# Patient Record
Sex: MALE | Race: WHITE | Employment: FULL TIME | URBAN - METROPOLITAN AREA
[De-identification: names, ages, dates, MRNs, and addresses within clinical notes are randomized per-mention and may not be internally consistent; named-entity substitution may affect disease eponyms.]

---

## 2017-02-13 ENCOUNTER — OFFICE VISIT (OUTPATIENT)
Dept: FAMILY MEDICINE | Facility: OTHER | Age: 27
End: 2017-02-13
Attending: NURSE PRACTITIONER
Payer: COMMERCIAL

## 2017-02-13 VITALS
HEIGHT: 74 IN | HEART RATE: 88 BPM | OXYGEN SATURATION: 97 % | TEMPERATURE: 98.2 F | SYSTOLIC BLOOD PRESSURE: 132 MMHG | DIASTOLIC BLOOD PRESSURE: 70 MMHG | BODY MASS INDEX: 33.65 KG/M2 | RESPIRATION RATE: 14 BRPM | WEIGHT: 262.2 LBS

## 2017-02-13 DIAGNOSIS — F33.0 MILD EPISODE OF RECURRENT MAJOR DEPRESSIVE DISORDER (H): ICD-10-CM

## 2017-02-13 DIAGNOSIS — E03.4 HYPOTHYROIDISM DUE TO ACQUIRED ATROPHY OF THYROID: ICD-10-CM

## 2017-02-13 DIAGNOSIS — J45.30 MILD PERSISTENT ASTHMA WITHOUT COMPLICATION: ICD-10-CM

## 2017-02-13 DIAGNOSIS — M25.511 RIGHT SHOULDER PAIN, UNSPECIFIED CHRONICITY: ICD-10-CM

## 2017-02-13 DIAGNOSIS — I10 BENIGN ESSENTIAL HYPERTENSION: Primary | ICD-10-CM

## 2017-02-13 LAB
ANION GAP SERPL CALCULATED.3IONS-SCNC: 8 MMOL/L (ref 3–14)
BUN SERPL-MCNC: 12 MG/DL (ref 7–30)
CALCIUM SERPL-MCNC: 9.8 MG/DL (ref 8.5–10.1)
CHLORIDE SERPL-SCNC: 104 MMOL/L (ref 94–109)
CHOLEST SERPL-MCNC: 182 MG/DL
CO2 SERPL-SCNC: 27 MMOL/L (ref 20–32)
CREAT SERPL-MCNC: 0.86 MG/DL (ref 0.66–1.25)
GFR SERPL CREATININE-BSD FRML MDRD: NORMAL ML/MIN/1.7M2
GLUCOSE SERPL-MCNC: 82 MG/DL (ref 70–99)
HDLC SERPL-MCNC: 46 MG/DL
LDLC SERPL CALC-MCNC: 107 MG/DL
NONHDLC SERPL-MCNC: 136 MG/DL
POTASSIUM SERPL-SCNC: 4.6 MMOL/L (ref 3.4–5.3)
SODIUM SERPL-SCNC: 139 MMOL/L (ref 133–144)
TRIGL SERPL-MCNC: 147 MG/DL
TSH SERPL DL<=0.005 MIU/L-ACNC: 1.92 MU/L (ref 0.4–4)

## 2017-02-13 PROCEDURE — 36415 COLL VENOUS BLD VENIPUNCTURE: CPT | Performed by: NURSE PRACTITIONER

## 2017-02-13 PROCEDURE — 99214 OFFICE O/P EST MOD 30 MIN: CPT | Performed by: NURSE PRACTITIONER

## 2017-02-13 PROCEDURE — 80048 BASIC METABOLIC PNL TOTAL CA: CPT | Performed by: NURSE PRACTITIONER

## 2017-02-13 PROCEDURE — 80061 LIPID PANEL: CPT | Performed by: NURSE PRACTITIONER

## 2017-02-13 PROCEDURE — 84443 ASSAY THYROID STIM HORMONE: CPT | Performed by: NURSE PRACTITIONER

## 2017-02-13 RX ORDER — TRAMADOL HYDROCHLORIDE 50 MG/1
50-100 TABLET ORAL
Qty: 30 TABLET | Refills: 0 | Status: SHIPPED | OUTPATIENT
Start: 2017-02-13 | End: 2017-05-15

## 2017-02-13 ASSESSMENT — PAIN SCALES - GENERAL: PAINLEVEL: MODERATE PAIN (4)

## 2017-02-13 NOTE — MR AVS SNAPSHOT
After Visit Summary   2/13/2017    Kannan Ordonez    MRN: [id number]           Patient Information     Date Of Birth          1990        Visit Information        Provider Department      2/13/2017 9:45 AM Isabela Gonzalez, NP [facility]        Today's Diagnoses     Benign essential hypertension    -  1    Mild persistent asthma without complication        Hypothyroidism due to acquired atrophy of thyroid        Mild episode of recurrent major depressive disorder (H)        Right shoulder pain, unspecified chronicity          Care Instructions      ASSESSMENT / PLAN:  1. Benign essential hypertension  chronic  - Lipid Profile  - Basic metabolic panel    2. Mild persistent asthma without complication  Chronic  - continue current plan    3. Hypothyroidism due to acquired atrophy of thyroid  chornic  - TSH with free T4 reflex    4. Mild episode of recurrent major depressive disorder (H)  chronic    5. Right shoulder pain, unspecified chronicity  Follow-up with ortho as scheduled  - traMADol (ULTRAM) 50 MG tablet; Take 1-2 tablets ( mg) by mouth nightly as needed for pain maximum 2 tablet(s) per day  Dispense: 30 tablet; Refill: 0  - continue ice, heat  - ibuprofen 800mg twice daily      Follow-up in 3 months or as needed for acute concerns    Isabela Gonzalez,   Certified Adult Nurse Practitioner  [phone]  My Asthma Action Plan  Name: Kannan Ordonez   YOB: 1990  Date: 2/13/2017   My doctor: Isabela Gonzalez   [facility]      My Control Medicine: Budesonide+formoterol (Symbicort) -  160/4.5 mcg        Dose: 2 PUFFS  My Rescue Medicine: Albuterol (Proair/Ventolin/Proventil) HFA        Dose: PRN   My Asthma Severity: mild persistent  Avoid your asthma triggers: upper respiratory infections, pollens, animal dander, humidity, exercise or sports and cold air        GREEN ZONE   Good Control    I feel  good    No cough or wheeze    Can work, sleep and play without asthma symptoms       Take your asthma control medicine every day.     1. If exercise triggers your asthma, take your rescue medication    15 minutes before exercise or sports, and    During exercise if you have asthma symptoms  2. Spacer to use with inhaler: If you have a spacer, make sure to use it with your inhaler             YELLOW ZONE Getting Worse  I have ANY of these:    I do not feel good    Cough or wheeze    Chest feels tight    Wake up at night   1. Keep taking your Green Zone medications  2. Start taking your rescue medicine:    every 20 minutes for up to 1 hour. Then every 4 hours for 24-48 hours.  3. If you stay in the Yellow Zone for more than 12-24 hours, contact your doctor.  4. If you do not return to the Green Zone in 12-24 hours or you get worse, start taking your oral steroid medicine if prescribed by your provider.           RED ZONE Medical Alert - Get Help  I have ANY of these:    I feel awful    Medicine is not helping    Breathing getting harder    Trouble walking or talking    Nose opens wide to breathe       1. Take your rescue medicine NOW  2. If your provider has prescribed an oral steroid medicine, start taking it NOW  3. Call your doctor NOW  4. If you are still in the Red Zone after 20 minutes and you have not reached your doctor:    Take your rescue medicine again and    Call 911 or go to the emergency room right away    See your regular doctor within 2 weeks of an Emergency Room or Urgent Care visit for follow-up treatment.        The above medication may be given at school or day care?: Yes and N/A (Adult Patient)  Child can carry and use inhaler(s) at school with approval of school nurse?: Yes and N/A (Adult Patient)    Electronically signed by: ELVA IBARRA, February 13, 2017    Annual Reminders:  Meet with Asthma Educator,  Flu Shot in the Fall, consider Pneumonia Vaccination for patients with asthma (aged 19  and older).    Pharmacy:    [id number] - [place], [place] - [address], [address] [id number] - [place], [place] - [address] AT [facility] [address]                    Asthma Triggers  How To Control Things That Make Your Asthma Worse    Triggers are things that make your asthma worse.  Look at the list below to help you find your triggers and what you can do about them.  You can help prevent asthma flare-ups by staying away from your triggers.      Trigger                                                          What you can do   Cigarette Smoke  Tobacco smoke can make asthma worse. Do not allow smoking in your home, car or around you.  Be sure no one smokes at a child s day care or school.  If you smoke, ask your health care provider for ways to help you quit.  Ask family members to quit too.  Ask your health care provider for a referral to Quit Plan to help you quit smoking, or call 3-119-502-PLAN.     Colds, Flu, Bronchitis  These are common triggers of asthma. Wash your hands often.  Don t touch your eyes, nose or mouth.  Get a flu shot every year.     Dust Mites  These are tiny bugs that live in cloth or carpet. They are too small to see. Wash sheets and blankets in hot water every week.   Encase pillows and mattress in dust mite proof covers.  Avoid having carpet if you can. If you have carpet, vacuum weekly.   Use a dust mask and HEPA vacuum.   Pollen and Outdoor Mold  Some people are allergic to trees, grass, or weed pollen, or molds. Try to keep your windows closed.  Limit time out doors when pollen count is high.   Ask you health care provider about taking medicine during allergy season.     Animal Dander  Some people are allergic to skin flakes, urine or saliva from pets with fur or feathers. Keep pets with fur or feathers out of your home.    If you can t keep the pet outdoors, then keep the pet out of your bedroom.  Keep the bedroom door closed.  Keep pets off cloth  furniture and away from stuffed toys.     Mice, Rats, and Cockroaches  Some people are allergic to the waste from these pests.   Cover food and garbage.  Clean up spills and food crumbs.  Store grease in the refrigerator.   Keep food out of the bedroom.   Indoor Mold  This can be a trigger if your home has high moisture. Fix leaking faucets, pipes, or other sources of water.   Clean moldy surfaces.  Dehumidify basement if it is damp and smelly.   Smoke, Strong Odors, and Sprays  These can reduce air quality. Stay away from strong odors and sprays, such as perfume, powder, hair spray, paints, smoke incense, paint, cleaning products, candles and new carpet.   Exercise or Sports  Some people with asthma have this trigger. Be active!  Ask your doctor about taking medicine before sports or exercise to prevent symptoms.    Warm up for 5-10 minutes before and after sports or exercise.     Other Triggers of Asthma  Cold air:  Cover your nose and mouth with a scarf.  Sometimes laughing or crying can be a trigger.  Some medicines and food can trigger asthma.           Follow-ups after your visit        Follow-up notes from your care team     Return in about 3 months (around 5/13/2017), or if symptoms worsen or fail to improve.      Your next 10 appointments already scheduled     May 15, 2017  4:00 PM CDT   (Arrive by 3:45 PM)   SHORT with Isabela Gonzalez NP   [facility] )    [address] [address]   [phone]              Who to contact     If you have questions or need follow up information about today's clinic visit or your schedule please contact [facility] directly at [phone].  Normal or non-critical lab and imaging results will be communicated to you by MyChart, letter or phone within 4 business days after the clinic has received the results. If you do not hear from us within 7 days, please contact the clinic through [employer] or  "phone. If you have a critical or abnormal lab result, we will notify you by phone as soon as possible.  Submit refill requests through [employer] or call your pharmacy and they will forward the refill request to us. Please allow 3 business days for your refill to be completed.          Additional Information About Your Visit        Gim[employer] Information     [employer]ighra lets you send messages to your doctor, view your test results, renew your prescriptions, schedule appointments and more. To sign up, go to [email or web address] . Click on \"Log in\" on the left side of the screen, which will take you to the Welcome page. Then click on \"Sign up Now\" on the right side of the page.     You will be asked to enter the access code listed below, as well as some personal information. Please follow the directions to create your username and password.     Your access code is: [email or web address]  Expires: 3/7/2017  4:14 PM     Your access code will  in 90 days. If you need help or a new code, please call your [facility] clinic or [phone].        Care EveryWhere ID     This is your Care EveryWhere ID. This could be used by other organizations to access your [facility] medical records  [id number]        Your Vitals Were     Pulse Temperature Respirations Height Pulse Oximetry BMI (Body Mass Index)    88 98.2  F (36.8  C) (Tympanic) 14 6' 2\" (1.88 m) 97% 33.66 kg/m2       Blood Pressure from Last 3 Encounters:   17 132/70   16 140/80   16 142/80    Weight from Last 3 Encounters:   17 262 lb 3.2 oz (118.9 kg)   16 270 lb (122.5 kg)   16 273 lb (123.8 kg)              We Performed the Following     Basic metabolic panel     Lipid Profile     TSH with free T4 reflex          Today's Medication Changes          These changes are accurate as of: 17  1:00 PM.  If you have any questions, ask your nurse or doctor.               Start taking these medicines.        Dose/Directions    traMADol 50 MG tablet "   Commonly known as:  ULTRAM   Used for:  Right shoulder pain, unspecified chronicity   Started by:  Isabela Gonzalez NP        Dose:   mg   Take 1-2 tablets ( mg) by mouth nightly as needed for pain maximum 2 tablet(s) per day   Quantity:  30 tablet   Refills:  0            Where to get your medicines      Some of these will need a paper prescription and others can be bought over the counter.  Ask your nurse if you have questions.     Bring a paper prescription for each of these medications     traMADol 50 MG tablet                Primary Care Provider Office Phone # Fax #    Isabela Gonzalez [phone]       [facility] [address] [place] [address] [address]IAZ  [facility] [address]        Thank you!     Thank you for choosing [facility]  for your care. Our goal is always to provide you with excellent care. Hearing back from our patients is one way we can continue to improve our services. Please take a few minutes to complete the written survey that you may receive in the mail after your visit with us. Thank you!             Your Updated Medication List - Protect others around you: Learn how to safely use, store and throw away your medicines at www.disposemymeds.org.          This list is accurate as of: 2/13/17  1:00 PM.  Always use your most recent med list.                   Brand Name Dispense Instructions for use    albuterol 108 (90 BASE) MCG/ACT Inhaler    PROAIR HFA/PROVENTIL HFA/VENTOLIN HFA    1 Inhaler    Inhale 2 puffs into the lungs every 6 hours as needed for shortness of breath / dyspnea       budesonide-formoterol 160-4.5 MCG/ACT Inhaler    SYMBICORT    3 Inhaler    Inhale 2 puffs into the lungs 2 times daily       citalopram 20 MG tablet    celeXA    45 tablet    Take 1.5 tablets (30 mg) by mouth daily       levothyroxine 50 MCG tablet    SYNTHROID/LEVOTHROID    90 tablet    Take 1 tablet (50 mcg) by mouth daily       lisinopril 20 MG tablet     PRINIVIL/ZESTRIL    90 tablet    Take 1 tablet (20 mg) by mouth daily       metoprolol 25 MG 24 hr tablet    TOPROL-XL    15 tablet    Take 0.5 tablets (12.5 mg) by mouth daily       traMADol 50 MG tablet    ULTRAM    30 tablet    Take 1-2 tablets ( mg) by mouth nightly as needed for pain maximum 2 tablet(s) per day

## 2017-02-13 NOTE — PROGRESS NOTES
SUBJECTIVE:  Kannan Ordonez, 26 year old, male presents with the following Chief Complaint(s) with HPI to follow:  Chief Complaint   Patient presents with     Musculoskeletal Problem     shoulder pain RIGHT  fell on ice 1 week ago      Hypertension     follow up     *_* Health Care Directive *_*     info at home          HPI:  Krzysztof presents today with the above concern.        Hypertension Follow-up      Outpatient blood pressures are not being checked.    Diet: no added salt       Depression Follow-Up    Status since last visit: well controlled with current plan    See PHQ-9 for current symptoms.    Other associated symptoms:None    Complicating factors:   Significant life event: No   Current substance abuse: None  Anxiety / Panic symptoms: No  PHQ-9  English PHQ-9   Any Language        Asthma Follow-Up    Respiratory symptoms:   Cough: No   Wheezing: No   Shortness of breath: No    Use of short- acting(rescue) inhaler: once a week,    Taking controlled (daily) meds as prescribed: No - uses symbicort once a week    ER/UC visits or hospital admissions since last visit: none     Recent asthma triggers that patient is dealing with: None    History   Smoking Status     Never Smoker   Smokeless Tobacco     Current User     Types: Chew            Hypothyroidism Follow-up      Since last visit, patient describes the following symptoms: Weight stable, no hair loss, no skin changes, no constipation, no loose stools     Right shoulder:  Onset of symptoms: chronic shoulder pain with torn rotator cuff, - worsened one week ago after fall on ice  Location of symptoms:  Right shoudler  Timing of symptoms: acute onset  Duration: comes and goes - worse at night  Cause/Injury:  Slipped on ice and fell onto right shoulder  Quality of symptoms: feels like tearing inside of shoulder, known rotator cuff tear.    Associated symptoms: denies numbness/tingling of right arm.   Severity of symptoms:    4/10    At night  8/10  Radiation: none  Aggravating factors:  Lifting arm above shoulder  Alleviating factors:  Benadryl to help with sleep, 800mg ibuprofen, no help, ice        Patient Active Problem List   Diagnosis     Benign essential hypertension     Mild persistent asthma     Hypothyroidism due to acquired atrophy of thyroid     Hyperlipidemia LDL goal <100     ACP (advance care planning)     Anxiety     Recurrent major depressive disorder (H)       Past Medical History   Diagnosis Date     Anxiety 6/24/2016     Asthma      Hyperlipidemia LDL goal <100 12/16/2015     Hypertension 3/3/2015     Hypothyroidism 5/1/2015     Recurrent major depressive disorder (H) 6/24/2016       Past Surgical History   Procedure Laterality Date     Orthopedic surgery  2015     r shoulder teat bone spur     Orthopedic surgery  2-2016     AC joint right     Orthopedic surgery  2016     Rt labrial tear       Family History   Problem Relation Age of Onset     DIABETES Mother      Hypertension Mother      Hyperlipidemia Mother      Coronary Artery Disease Father      Hyperlipidemia Father      Hypertension Father      Thyroid Disease No family hx of        Social History   Substance Use Topics     Smoking status: Never Smoker     Smokeless tobacco: Current User     Types: Chew     Alcohol use Yes      Comment: occ       Current Outpatient Prescriptions   Medication Sig Dispense Refill     metoprolol (TOPROL-XL) 25 MG 24 hr tablet Take 0.5 tablets (12.5 mg) by mouth daily 15 tablet 1     albuterol (PROAIR HFA, PROVENTIL HFA, VENTOLIN HFA) 108 (90 BASE) MCG/ACT inhaler Inhale 2 puffs into the lungs every 6 hours as needed for shortness of breath / dyspnea 1 Inhaler 1     budesonide-formoterol (SYMBICORT) 160-4.5 MCG/ACT inhaler Inhale 2 puffs into the lungs 2 times daily 3 Inhaler 1     citalopram (CELEXA) 20 MG tablet Take 1.5 tablets (30 mg) by mouth daily 45 tablet 3     lisinopril (PRINIVIL,ZESTRIL) 20 MG tablet Take 1 tablet (20 mg) by mouth daily  90 tablet 1     levothyroxine (SYNTHROID, LEVOTHROID) 50 MCG tablet Take 1 tablet (50 mcg) by mouth daily 90 tablet 3       Allergies   Allergen Reactions     Morphine Sulfate Rash     Cats      Other reaction(s): Eye Irritation, Sneezing     Dogs      Other reaction(s): Eye Irritation, Sneezing       Recent Labs   Lab Test  11/07/16   1403  08/18/16   1658  05/26/16   1603   02/02/16   1519  12/14/15   1258   08/28/15   1449  07/07/15   1612   LDL  103*  113*   --    --    --   130*   --    --    --    HDL  47  47   --    --    --   50   --    --    --    TRIG  167*  164*   --    --    --   146   --    --    --    ALT   --    --    --    --   78*   --    --   51  123*   CR  1.01  0.95   --    < >  1.19  1.02   < >  0.84  0.96   GFRESTIMATED  89  >90  Non  GFR Calc     --    < >  74  88   < >  >90  Non  GFR Calc    >90  Non  GFR Calc     GFRESTBLACK  >90   GFR Calc    >90   GFR Calc     --    < >  89  >90   GFR Calc     < >  >90   GFR Calc    >90   GFR Calc     POTASSIUM  3.9  3.7   --    < >  3.8  3.7   < >  4.1  4.1   TSH  2.86   --   1.85   < >  4.41*  3.18   --    --   3.13    < > = values in this interval not displayed.      BP Readings from Last 3 Encounters:   02/13/17 132/70   12/07/16 140/80   11/07/16 142/80    Wt Readings from Last 3 Encounters:   02/13/17 262 lb 3.2 oz (118.9 kg)   12/07/16 270 lb (122.5 kg)   11/07/16 273 lb (123.8 kg)                    REVIEW OF SYSTEMS  Skin: negative  Eyes: negative  Ears/Nose/Throat: negative  Respiratory: No shortness of breath, dyspnea on exertion, cough, or hemoptysis  Cardiovascular: negative  Gastrointestinal: negative  Genitourinary: negative  Musculoskeletal: as above  Neurologic: negative  Psychiatric: negative  Hematologic/Lymphatic/Immunologic: negative  Endocrine: negative    OBJECTIVE:    B/P: 132/70, T: 98.2, P: 88, R: 14, W:  262 lbs 3.2 oz, BMI: Body mass index is 33.66 kg/(m^2).  Constitutional: healthy, alert and no distress  Cardiovascular: RRR. No murmurs, clicks gallops or rub  Respiratory:  Good diaphragmatic excursion. Lungs clear  Psychiatric: mentation appears normal and affect normal/bright        ASSESSMENT / PLAN:  1. Benign essential hypertension  chronic  - Lipid Profile  - Basic metabolic panel    2. Mild persistent asthma without complication  Chronic  - continue current plan    3. Hypothyroidism due to acquired atrophy of thyroid  chornic  - TSH with free T4 reflex    4. Mild episode of recurrent major depressive disorder (H)  chronic    5. Right shoulder pain, unspecified chronicity  Follow-up with ortho as scheduled  - traMADol (ULTRAM) 50 MG tablet; Take 1-2 tablets ( mg) by mouth nightly as needed for pain maximum 2 tablet(s) per day  Dispense: 30 tablet; Refill: 0  - continue ice, heat  - ibuprofen 800mg twice daily  - schedule surgery as recommended by ortho      Follow-up in 3 months or as needed for acute concerns    Isabela Gonzalez,   Certified Adult Nurse Practitioner  [phone]

## 2017-02-13 NOTE — PATIENT INSTRUCTIONS
ASSESSMENT / PLAN:  1. Benign essential hypertension  chronic  - Lipid Profile  - Basic metabolic panel    2. Mild persistent asthma without complication  Chronic  - continue current plan    3. Hypothyroidism due to acquired atrophy of thyroid  chornic  - TSH with free T4 reflex    4. Mild episode of recurrent major depressive disorder (H)  chronic    5. Right shoulder pain, unspecified chronicity  Follow-up with ortho as scheduled  - traMADol (ULTRAM) 50 MG tablet; Take 1-2 tablets ( mg) by mouth nightly as needed for pain maximum 2 tablet(s) per day  Dispense: 30 tablet; Refill: 0  - continue ice, heat  - ibuprofen 800mg twice daily      Follow-up in 3 months or as needed for acute concerns    Isabela Gonzalez,   Certified Adult Nurse Practitioner  [phone]  My Asthma Action Plan  Name: Kannan Ordonez   YOB: 1990  Date: 2/13/2017   My doctor: Isabela Gonzalez   My clinic: [facility]      My Control Medicine: Budesonide+formoterol (Symbicort) -  160/4.5 mcg        Dose: 2 PUFFS  My Rescue Medicine: Albuterol (Proair/Ventolin/Proventil) HFA        Dose: PRN   My Asthma Severity: mild persistent  Avoid your asthma triggers: upper respiratory infections, pollens, animal dander, humidity, exercise or sports and cold air        GREEN ZONE   Good Control    I feel good    No cough or wheeze    Can work, sleep and play without asthma symptoms       Take your asthma control medicine every day.     1. If exercise triggers your asthma, take your rescue medication    15 minutes before exercise or sports, and    During exercise if you have asthma symptoms  2. Spacer to use with inhaler: If you have a spacer, make sure to use it with your inhaler             YELLOW ZONE Getting Worse  I have ANY of these:    I do not feel good    Cough or wheeze    Chest feels tight    Wake up at night   1. Keep taking your Green Zone medications  2. Start taking your rescue  medicine:    every 20 minutes for up to 1 hour. Then every 4 hours for 24-48 hours.  3. If you stay in the Yellow Zone for more than 12-24 hours, contact your doctor.  4. If you do not return to the Green Zone in 12-24 hours or you get worse, start taking your oral steroid medicine if prescribed by your provider.           RED ZONE Medical Alert - Get Help  I have ANY of these:    I feel awful    Medicine is not helping    Breathing getting harder    Trouble walking or talking    Nose opens wide to breathe       1. Take your rescue medicine NOW  2. If your provider has prescribed an oral steroid medicine, start taking it NOW  3. Call your doctor NOW  4. If you are still in the Red Zone after 20 minutes and you have not reached your doctor:    Take your rescue medicine again and    Call 911 or go to the emergency room right away    See your regular doctor within 2 weeks of an Emergency Room or Urgent Care visit for follow-up treatment.        The above medication may be given at school or day care?: Yes and N/A (Adult Patient)  Child can carry and use inhaler(s) at school with approval of school nurse?: Yes and N/A (Adult Patient)    Electronically signed by: ELVA IBARRA, February 13, 2017    Annual Reminders:  Meet with Asthma Educator,  Flu Shot in the Fall, consider Pneumonia Vaccination for patients with asthma (aged 19 and older).    Pharmacy:    [id number] - [facility], [address] [place] [address] DRUG STORE [address] [place], [place] - [facility] [address] [facility] [address]                    Asthma Triggers  How To Control Things That Make Your Asthma Worse    Triggers are things that make your asthma worse.  Look at the list below to help you find your triggers and what you can do about them.  You can help prevent asthma flare-ups by staying away from your triggers.      Trigger                                                          What you can do   Cigarette Smoke  Tobacco smoke  can make asthma worse. Do not allow smoking in your home, car or around you.  Be sure no one smokes at a child s day care or school.  If you smoke, ask your health care provider for ways to help you quit.  Ask family members to quit too.  Ask your health care provider for a referral to Quit Plan to help you quit smoking, or call 3-168-703-PLAN.     Colds, Flu, Bronchitis  These are common triggers of asthma. Wash your hands often.  Don t touch your eyes, nose or mouth.  Get a flu shot every year.     Dust Mites  These are tiny bugs that live in cloth or carpet. They are too small to see. Wash sheets and blankets in hot water every week.   Encase pillows and mattress in dust mite proof covers.  Avoid having carpet if you can. If you have carpet, vacuum weekly.   Use a dust mask and HEPA vacuum.   Pollen and Outdoor Mold  Some people are allergic to trees, grass, or weed pollen, or molds. Try to keep your windows closed.  Limit time out doors when pollen count is high.   Ask you health care provider about taking medicine during allergy season.     Animal Dander  Some people are allergic to skin flakes, urine or saliva from pets with fur or feathers. Keep pets with fur or feathers out of your home.    If you can t keep the pet outdoors, then keep the pet out of your bedroom.  Keep the bedroom door closed.  Keep pets off cloth furniture and away from stuffed toys.     Mice, Rats, and Cockroaches  Some people are allergic to the waste from these pests.   Cover food and garbage.  Clean up spills and food crumbs.  Store grease in the refrigerator.   Keep food out of the bedroom.   Indoor Mold  This can be a trigger if your home has high moisture. Fix leaking faucets, pipes, or other sources of water.   Clean moldy surfaces.  Dehumidify basement if it is damp and smelly.   Smoke, Strong Odors, and Sprays  These can reduce air quality. Stay away from strong odors and sprays, such as perfume, powder, hair spray, paints, smoke  incense, paint, cleaning products, candles and new carpet.   Exercise or Sports  Some people with asthma have this trigger. Be active!  Ask your doctor about taking medicine before sports or exercise to prevent symptoms.    Warm up for 5-10 minutes before and after sports or exercise.     Other Triggers of Asthma  Cold air:  Cover your nose and mouth with a scarf.  Sometimes laughing or crying can be a trigger.  Some medicines and food can trigger asthma.

## 2017-02-13 NOTE — NURSING NOTE
".  Chief Complaint   Patient presents with     Musculoskeletal Problem     shoulder pain RIGHT  fell on ice 1 week ago      Hypertension     follow up     *_* Health Care Directive *_*     info at home       Initial /70 (BP Location: Left arm, Patient Position: Chair, Cuff Size: Adult Large)  Pulse 88  Temp 98.2  F (36.8  C) (Tympanic)  Resp 14  Ht 6' 2\" (1.88 m)  Wt 262 lb 3.2 oz (118.9 kg)  SpO2 97%  BMI 33.66 kg/m2 Estimated body mass index is 33.66 kg/(m^2) as calculated from the following:    Height as of this encounter: 6' 2\" (1.88 m).    Weight as of this encounter: 262 lb 3.2 oz (118.9 kg).  Medication Reconciliation: saray IBARRA      "

## 2017-03-17 DIAGNOSIS — I10 BENIGN ESSENTIAL HYPERTENSION: ICD-10-CM

## 2017-03-17 RX ORDER — METOPROLOL SUCCINATE 25 MG/1
TABLET, EXTENDED RELEASE ORAL
Qty: 15 TABLET | Refills: 10 | Status: SHIPPED | OUTPATIENT
Start: 2017-03-17 | End: 2017-10-24

## 2017-04-27 DIAGNOSIS — F33.0 MILD EPISODE OF RECURRENT MAJOR DEPRESSIVE DISORDER (H): ICD-10-CM

## 2017-04-27 NOTE — TELEPHONE ENCOUNTER
celexa       Last Written Prescription Date: 11/7/16  Last Fill Quantity: 45; # refills: 3  Last Office Visit with [facility], [facility] or [facility] prescribing provider:  2/13/17   Next 5 appointments (look out 90 days)     May 15, 2017  4:00 PM CDT   (Arrive by 3:45 PM)   SHORT with Isabela Gonzalez NP   [facility] ([facility] )    [address]  [facility] [address]   [phone]                   Last PHQ-9 score on record=   PHQ-9 SCORE 12/7/2016   Total Score -   Total Score 0       Lab Results   Component Value Date    AST 28 02/02/2016     Lab Results   Component Value Date    ALT 78 02/02/2016

## 2017-04-28 RX ORDER — CITALOPRAM HYDROBROMIDE 20 MG/1
TABLET ORAL
Qty: 45 TABLET | Refills: 0 | Status: SHIPPED | OUTPATIENT
Start: 2017-04-28 | End: 2017-05-15

## 2017-05-15 ENCOUNTER — OFFICE VISIT (OUTPATIENT)
Dept: FAMILY MEDICINE | Facility: OTHER | Age: 27
End: 2017-05-15
Attending: NURSE PRACTITIONER
Payer: COMMERCIAL

## 2017-05-15 VITALS
DIASTOLIC BLOOD PRESSURE: 91 MMHG | HEART RATE: 75 BPM | TEMPERATURE: 98.1 F | SYSTOLIC BLOOD PRESSURE: 150 MMHG | HEIGHT: 74 IN | OXYGEN SATURATION: 98 % | BODY MASS INDEX: 35.94 KG/M2 | WEIGHT: 280 LBS

## 2017-05-15 DIAGNOSIS — M79.641 PAIN OF RIGHT HAND: ICD-10-CM

## 2017-05-15 DIAGNOSIS — E78.5 HYPERLIPIDEMIA LDL GOAL <100: ICD-10-CM

## 2017-05-15 DIAGNOSIS — J45.30 MILD PERSISTENT ASTHMA WITHOUT COMPLICATION: Primary | ICD-10-CM

## 2017-05-15 DIAGNOSIS — E03.4 HYPOTHYROIDISM DUE TO ACQUIRED ATROPHY OF THYROID: ICD-10-CM

## 2017-05-15 DIAGNOSIS — I10 BENIGN ESSENTIAL HYPERTENSION: ICD-10-CM

## 2017-05-15 DIAGNOSIS — F33.0 MILD EPISODE OF RECURRENT MAJOR DEPRESSIVE DISORDER (H): ICD-10-CM

## 2017-05-15 DIAGNOSIS — I10 ESSENTIAL HYPERTENSION WITH GOAL BLOOD PRESSURE LESS THAN 130/80: ICD-10-CM

## 2017-05-15 DIAGNOSIS — F41.9 ANXIETY: ICD-10-CM

## 2017-05-15 PROCEDURE — 84443 ASSAY THYROID STIM HORMONE: CPT | Performed by: NURSE PRACTITIONER

## 2017-05-15 PROCEDURE — 99000 SPECIMEN HANDLING OFFICE-LAB: CPT | Performed by: NURSE PRACTITIONER

## 2017-05-15 PROCEDURE — 99214 OFFICE O/P EST MOD 30 MIN: CPT | Performed by: NURSE PRACTITIONER

## 2017-05-15 PROCEDURE — 80048 BASIC METABOLIC PNL TOTAL CA: CPT | Performed by: NURSE PRACTITIONER

## 2017-05-15 PROCEDURE — 80061 LIPID PANEL: CPT | Performed by: NURSE PRACTITIONER

## 2017-05-15 PROCEDURE — 36415 COLL VENOUS BLD VENIPUNCTURE: CPT | Performed by: NURSE PRACTITIONER

## 2017-05-15 PROCEDURE — 82306 VITAMIN D 25 HYDROXY: CPT | Mod: 90 | Performed by: NURSE PRACTITIONER

## 2017-05-15 RX ORDER — LISINOPRIL 40 MG/1
40 TABLET ORAL DAILY
Qty: 30 TABLET | Refills: 5 | Status: SHIPPED | OUTPATIENT
Start: 2017-05-15 | End: 2017-10-24

## 2017-05-15 RX ORDER — LISINOPRIL 30 MG/1
30 TABLET ORAL DAILY
Qty: 30 TABLET | Refills: 5 | Status: SHIPPED | OUTPATIENT
Start: 2017-05-15 | End: 2017-05-15

## 2017-05-15 RX ORDER — CITALOPRAM HYDROBROMIDE 40 MG/1
40 TABLET ORAL DAILY
Qty: 30 TABLET | Refills: 1 | Status: SHIPPED | OUTPATIENT
Start: 2017-05-15 | End: 2017-09-05

## 2017-05-15 ASSESSMENT — ANXIETY QUESTIONNAIRES
5. BEING SO RESTLESS THAT IT IS HARD TO SIT STILL: SEVERAL DAYS
3. WORRYING TOO MUCH ABOUT DIFFERENT THINGS: MORE THAN HALF THE DAYS
7. FEELING AFRAID AS IF SOMETHING AWFUL MIGHT HAPPEN: NOT AT ALL
2. NOT BEING ABLE TO STOP OR CONTROL WORRYING: MORE THAN HALF THE DAYS
GAD7 TOTAL SCORE: 10
6. BECOMING EASILY ANNOYED OR IRRITABLE: MORE THAN HALF THE DAYS
1. FEELING NERVOUS, ANXIOUS, OR ON EDGE: MORE THAN HALF THE DAYS

## 2017-05-15 ASSESSMENT — PAIN SCALES - GENERAL: PAINLEVEL: NO PAIN (0)

## 2017-05-15 ASSESSMENT — PATIENT HEALTH QUESTIONNAIRE - PHQ9: 5. POOR APPETITE OR OVEREATING: SEVERAL DAYS

## 2017-05-15 NOTE — MR AVS SNAPSHOT
After Visit Summary   5/15/2017    Kannan Ordonez    MRN: [id number]           Patient Information     Date Of Birth          1990        Visit Information        Provider Department      5/15/2017 4:00 PM Isabela Gonzalez, NP [facility]        Today's Diagnoses     Mild persistent asthma without complication    -  1    Hypothyroidism due to acquired atrophy of thyroid        Hyperlipidemia LDL goal <100        Benign essential hypertension        Mild episode of recurrent major depressive disorder (H)        Anxiety        Essential hypertension with goal blood pressure less than 130/80        Pain of right hand          Care Instructions      ASSESSMENT/PLAN:                                                      1. Mild persistent asthma without complication  Continue current plan    2. Hypothyroidism due to acquired atrophy of thyroid  chronic  - TSH with free T4 reflex    3. Hyperlipidemia LDL goal <100  chronic  - Lipid Profile    4. Benign essential hypertension  chronic  - Basic metabolic panel  - start aspirin 81 MG EC tablet; Take 1 tablet (81 mg) by mouth daily  Dispense: 90 tablet; Refill: 3  - increase lisinopril (PRINIVIL/ZESTRIL) 40 MG tablet; Take 1 tablet (40 mg) by mouth daily  Dispense: 30 tablet; Refill: 5  - continue metoprolol for now     5. Mild episode of recurrent major depressive disorder (H)  chronic  - increase citalopram (CELEXA) 40 MG tablet; Take 1 tablet (40 mg) by mouth daily  Dispense: 30 tablet; Refill: 1  - Vitamin D level today  - start vit D3 2000IU daily  - start 5HTP over the counter    6. Anxiety  As above    7. Pain of right hand  Symptomatic -   - XR HAND RT G/E 3 VW (Clinic Performed) - declined XR - will consider next week  - ice, rest, ibuprofen      Follow-up in 1 week or as needed for acute concerns    Isabela Gonzalez,   Certified Adult Nurse Practitioner  [phone]              Follow-ups after your visit       "  Follow-up notes from your care team     Return in about 1 week (around 2017), or if symptoms worsen or fail to improve.      Your next 10 appointments already scheduled     May 25, 2017  3:45 PM CDT   (Arrive by 3:30 PM)   SHORT with Isabela Gonzalez NP   [facility] ([facility] )    [address]  [facility] [address]   [phone]              Who to contact     If you have questions or need follow up information about today's clinic visit or your schedule please contact [facility]] directly at [phone].  Normal or non-critical lab and imaging results will be communicated to you by MyChart, letter or phone within 4 business days after the clinic has received the results. If you do not hear from us within 7 days, please contact the clinic through [employer] or phone. If you have a critical or abnormal lab result, we will notify you by phone as soon as possible.  Submit refill requests through [facility] or call your pharmacy and they will forward the refill request to us. Please allow 3 business days for your refill to be completed.          Additional Information About Your Visit        [employer] Information     [facility] lets you send messages to your doctor, view your test results, renew your prescriptions, schedule appointments and more. To sign up, go to [email or web address] . Click on \"Log in\" on the left side of the screen, which will take you to the Welcome page. Then click on \"Sign up Now\" on the right side of the page.     You will be asked to enter the access code listed below, as well as some personal information. Please follow the directions to create your username and password.     Your access code is: [email or web address]  Expires: 2017  5:02 PM     Your access code will  in 90 days. If you need help or a new code, please call your [facility] or [phone].        Care EveryWhere ID     This is your Care EveryWhere ID. This could " "be used by other organizations to access your [place] medical records  [id number]        Your Vitals Were     Pulse Temperature Height Pulse Oximetry BMI (Body Mass Index)       75 98.1  F (36.7  C) (Tympanic) 6' 2\" (1.88 m) 98% 35.95 kg/m2        Blood Pressure from Last 3 Encounters:   05/15/17 (!) 150/91   02/13/17 132/70   12/07/16 140/80    Weight from Last 3 Encounters:   05/15/17 280 lb (127 kg)   02/13/17 262 lb 3.2 oz (118.9 kg)   12/07/16 270 lb (122.5 kg)              We Performed the Following     Basic metabolic panel     Lipid Profile     TSH with free T4 reflex     Vitamin D Deficiency          Today's Medication Changes          These changes are accurate as of: 5/15/17  5:02 PM.  If you have any questions, ask your nurse or doctor.               Start taking these medicines.        Dose/Directions    aspirin 81 MG EC tablet   Used for:  Benign essential hypertension   Started by:  Isabela Gonzalez NP        Dose:  81 mg   Take 1 tablet (81 mg) by mouth daily   Quantity:  90 tablet   Refills:  3       lisinopril 40 MG tablet   Commonly known as:  PRINIVIL/ZESTRIL   Used for:  Essential hypertension with goal blood pressure less than 130/80   Started by:  Isabela Gonzalez NP        Dose:  40 mg   Take 1 tablet (40 mg) by mouth daily   Quantity:  30 tablet   Refills:  5         These medicines have changed or have updated prescriptions.        Dose/Directions    citalopram 40 MG tablet   Commonly known as:  celeXA   This may have changed:    - medication strength  - how much to take   Used for:  Mild episode of recurrent major depressive disorder (H)   Changed by:  Isabela Gonzalez NP        Dose:  40 mg   Take 1 tablet (40 mg) by mouth daily   Quantity:  30 tablet   Refills:  1            Where to get your medicines      These medications were sent to [facility] - [place], [place] - [address] AT [facility] [address]  [address] " [place] [address]     Phone:  [phone]     aspirin 81 MG EC tablet    citalopram 40 MG tablet    lisinopril 40 MG tablet                Primary Care Provider Office Phone # Fax #    Isabela DAGMAR Gonzalez [phone]       [facility] [address] [place]  [place] [address]        Thank you!     Thank you for choosing [facility]  for your care. Our goal is always to provide you with excellent care. Hearing back from our patients is one way we can continue to improve our services. Please take a few minutes to complete the written survey that you may receive in the mail after your visit with us. Thank you!             Your Updated Medication List - Protect others around you: Learn how to safely use, store and throw away your medicines at www.disposemymeds.org.          This list is accurate as of: 5/15/17  5:02 PM.  Always use your most recent med list.                   Brand Name Dispense Instructions for use    albuterol 108 (90 BASE) MCG/ACT Inhaler    PROAIR HFA/PROVENTIL HFA/VENTOLIN HFA    1 Inhaler    Inhale 2 puffs into the lungs every 6 hours as needed for shortness of breath / dyspnea       aspirin 81 MG EC tablet     90 tablet    Take 1 tablet (81 mg) by mouth daily       budesonide-formoterol 160-4.5 MCG/ACT Inhaler    SYMBICORT    3 Inhaler    Inhale 2 puffs into the lungs 2 times daily       citalopram 40 MG tablet    celeXA    30 tablet    Take 1 tablet (40 mg) by mouth daily       levothyroxine 50 MCG tablet    SYNTHROID/LEVOTHROID    90 tablet    Take 1 tablet (50 mcg) by mouth daily       lisinopril 40 MG tablet    PRINIVIL/ZESTRIL    30 tablet    Take 1 tablet (40 mg) by mouth daily       metoprolol 25 MG 24 hr tablet    TOPROL-XL    15 tablet    TAKE 1/2 TABLET(12.5 MG) BY MOUTH DAILY

## 2017-05-15 NOTE — PATIENT INSTRUCTIONS
ASSESSMENT/PLAN:                                                      1. Mild persistent asthma without complication  Continue current plan    2. Hypothyroidism due to acquired atrophy of thyroid  chronic  - TSH with free T4 reflex    3. Hyperlipidemia LDL goal <100  chronic  - Lipid Profile    4. Benign essential hypertension  chronic  - Basic metabolic panel  - start aspirin 81 MG EC tablet; Take 1 tablet (81 mg) by mouth daily  Dispense: 90 tablet; Refill: 3  - increase lisinopril (PRINIVIL/ZESTRIL) 40 MG tablet; Take 1 tablet (40 mg) by mouth daily  Dispense: 30 tablet; Refill: 5  - continue metoprolol for now     5. Mild episode of recurrent major depressive disorder (H)  chronic  - increase citalopram (CELEXA) 40 MG tablet; Take 1 tablet (40 mg) by mouth daily  Dispense: 30 tablet; Refill: 1  - Vitamin D level today  - start vit D3 2000IU daily  - start 5HTP over the counter    6. Anxiety  As above    7. Pain of right hand  Symptomatic -   - XR HAND RT G/E 3 VW (Clinic Performed) - declined XR - will consider next week  - ice, rest, ibuprofen      Follow-up in 1 week or as needed for acute concerns    Isabela Gonzalez,   Certified Adult Nurse Practitioner  [phone]

## 2017-05-15 NOTE — PROGRESS NOTES
SUBJECTIVE:                                                    Kannan Ordonez is a 27 year old male who presents to clinic today for the following health issues:      Hypertension Follow-up      Outpatient blood pressures are not being checked.    Low Salt Diet: not monitoring salt       Depression and Anxiety Follow-Up    Status since last visit: Worsened entered [facility] this morning - has appt with psychaitry tomorrow am.      Other associated symptoms:alcohol intake    Complicating factors:     Significant life event: denies any new stressors     Current substance abuse: Alcohol    PHQ-9 SCORE 11/7/2016 12/7/2016 5/15/2017   Total Score - - -   Total Score 0 0 14     ÁLVARO-7 SCORE 11/7/2016 12/7/2016 5/15/2017   Total Score 16 17 10        PHQ-9  English      PHQ-9   Any Language     GAD7     Asthma Follow-Up    Was ACT completed today?      ACT Total Scores 5/26/2016   ACT TOTAL SCORE (Goal Greater than or Equal to 20) 24   In the past 12 months, how many times did you visit the emergency room for your asthma without being admitted to the hospital? 0   In the past 12 months, how many times were you hospitalized overnight because of your asthma? 0       Recent asthma triggers that patient is dealing with: None - symptoms well controlled on current plan      Hypothyroidism Follow-up      Since last visit, patient describes the following symptoms: stopped taking synthroid 2 months ago, he has not been feeling well.  He feels that all his troubles started with the diagnosis of hypothyroidism.  He now feels worse than he did. He will restart synthroid.        Amount of exercise or physical activity: None    Problems taking medications regularly: Yes,  problems remembering to take    Medication side effects: none    Diet: regular (no restrictions)    Right hand pain, mild swelling.  He hit a door yesterday - declines treatment, states will be just fine.       Problem list and histories reviewed & adjusted,  as indicated.  Additional history: as documented    Patient Active Problem List   Diagnosis     Benign essential hypertension     Mild persistent asthma     Hypothyroidism due to acquired atrophy of thyroid     Hyperlipidemia LDL goal <100     ACP (advance care planning)     Anxiety     Recurrent major depressive disorder (H)     Past Surgical History:   Procedure Laterality Date     ORTHOPEDIC SURGERY  2015    r shoulder teat bone spur     ORTHOPEDIC SURGERY  2-2016    AC joint right     ORTHOPEDIC SURGERY  2016    Rt labrial tear       Social History   Substance Use Topics     Smoking status: Never Smoker     Smokeless tobacco: Current User     Types: Chew     Alcohol use Yes      Comment: occ     Family History   Problem Relation Age of Onset     DIABETES Mother      Hypertension Mother      Hyperlipidemia Mother      Coronary Artery Disease Father      Hyperlipidemia Father      Hypertension Father      Thyroid Disease No family hx of          Current Outpatient Prescriptions   Medication Sig Dispense Refill     metoprolol (TOPROL-XL) 25 MG 24 hr tablet TAKE 1/2 TABLET(12.5 MG) BY MOUTH DAILY 15 tablet 10     budesonide-formoterol (SYMBICORT) 160-4.5 MCG/ACT inhaler Inhale 2 puffs into the lungs 2 times daily 3 Inhaler 1     citalopram (CELEXA) 20 MG tablet Take 1.5 tablets (30 mg) by mouth daily 45 tablet 3     lisinopril (PRINIVIL,ZESTRIL) 20 MG tablet Take 1 tablet (20 mg) by mouth daily 90 tablet 1     levothyroxine (SYNTHROID, LEVOTHROID) 50 MCG tablet Take 1 tablet (50 mcg) by mouth daily 90 tablet 3     [DISCONTINUED] citalopram (CELEXA) 20 MG tablet TAKE 1 AND ONE-HALF TABLETS(30 MG) BY MOUTH DAILY (Patient not taking: Reported on 5/15/2017) 45 tablet 0     albuterol (PROAIR HFA, PROVENTIL HFA, VENTOLIN HFA) 108 (90 BASE) MCG/ACT inhaler Inhale 2 puffs into the lungs every 6 hours as needed for shortness of breath / dyspnea (Patient not taking: Reported on 5/15/2017) 1 Inhaler 1     Allergies   Allergen  "Reactions     Morphine Sulfate Rash     Cats      Other reaction(s): Eye Irritation, Sneezing     Dogs      Other reaction(s): Eye Irritation, Sneezing     Recent Labs   Lab Test  02/13/17   1020  11/07/16   1403  08/18/16   1658   02/02/16   1519   08/28/15   1449  07/07/15   1612   LDL  107*  103*  113*   --    --    < >   --    --    HDL  46  47  47   --    --    < >   --    --    TRIG  147  167*  164*   --    --    < >   --    --    ALT   --    --    --    --   78*   --   51  123*   CR  0.86  1.01  0.95   < >  1.19   < >  0.84  0.96   GFRESTIMATED  >90  Non  GFR Calc    89  >90  Non  GFR Calc     < >  74   < >  >90  Non  GFR Calc    >90  Non  GFR Calc     GFRESTBLACK  >90   GFR Calc    >90   GFR Calc    >90   GFR Calc     < >  89   < >  >90   GFR Calc    >90   GFR Calc     POTASSIUM  4.6  3.9  3.7   < >  3.8   < >  4.1  4.1   TSH  1.92  2.86   --    < >  4.41*   < >   --   3.13    < > = values in this interval not displayed.      BP Readings from Last 3 Encounters:   05/15/17 (!) 150/91   02/13/17 132/70   12/07/16 140/80    Wt Readings from Last 3 Encounters:   05/15/17 280 lb (127 kg)   02/13/17 262 lb 3.2 oz (118.9 kg)   12/07/16 270 lb (122.5 kg)                    Reviewed and updated as needed this visit by clinical staff  Tobacco  Allergies  Meds       Reviewed and updated as needed this visit by Provider         ROS:  Constitutional, HEENT, cardiovascular, pulmonary, gi and gu systems are negative, except as otherwise noted.    OBJECTIVE:                                                    BP (!) 150/91 (BP Location: Right arm, Cuff Size: Adult Large)  Pulse 75  Temp 98.1  F (36.7  C) (Tympanic)  Ht 6' 2\" (1.88 m)  Wt 280 lb (127 kg)  SpO2 98%  BMI 35.95 kg/m2  Body mass index is 35.95 kg/(m^2).  GENERAL: healthy, alert and no distress  NECK: no " adenopathy, no asymmetry, masses, or scars and thyroid normal to palpation  RESP: lungs clear to auscultation - no rales, rhonchi or wheezes  CV: regular rate and rhythm, normal S1 S2, no S3 or S4, no murmur, click or rub, no peripheral edema and peripheral pulses strong  MS: no gross musculoskeletal defects noted, no edema  PSYCH: mentation appears normal, affect normal/bright         ASSESSMENT/PLAN:                                                      1. Mild persistent asthma without complication  Continue current plan    2. Hypothyroidism due to acquired atrophy of thyroid  chronic  - TSH with free T4 reflex    3. Hyperlipidemia LDL goal <100  chronic  - Lipid Profile    4. Benign essential hypertension  chronic  - Basic metabolic panel  - start aspirin 81 MG EC tablet; Take 1 tablet (81 mg) by mouth daily  Dispense: 90 tablet; Refill: 3  - increase lisinopril (PRINIVIL/ZESTRIL) 40 MG tablet; Take 1 tablet (40 mg) by mouth daily  Dispense: 30 tablet; Refill: 5  - continue metoprolol for now     5. Mild episode of recurrent major depressive disorder (H)  chronic  - increase citalopram (CELEXA) 40 MG tablet; Take 1 tablet (40 mg) by mouth daily  Dispense: 30 tablet; Refill: 1  - Vitamin D level today  - start vit D3 2000IU daily  - start 5HTP over the counter    6. Anxiety  As above    7. Pain of right hand  Symptomatic -   - XR HAND RT G/E 3 VW (Clinic Performed) - declined XR - will consider next week if symptoms persist.    - ice, rest, ibuprofen      Follow-up in 1 week or as needed for acute concerns    Isabela Gonzalez,   Certified Adult Nurse Practitioner  [phone]

## 2017-05-15 NOTE — NURSING NOTE
"Chief Complaint   Patient presents with     RECHECK     hypertension, depression, anxiety and thyroid       Initial BP (!) 150/91 (BP Location: Right arm, Cuff Size: Adult Large)  Pulse 75  Temp 98.1  F (36.7  C) (Tympanic)  Ht 6' 2\" (1.88 m)  Wt 280 lb (127 kg)  SpO2 98%  BMI 35.95 kg/m2 Estimated body mass index is 35.95 kg/(m^2) as calculated from the following:    Height as of this encounter: 6' 2\" (1.88 m).    Weight as of this encounter: 280 lb (127 kg).  Medication Reconciliation: complete     Kathleen Galeano      "

## 2017-05-16 ENCOUNTER — TELEPHONE (OUTPATIENT)
Dept: FAMILY MEDICINE | Facility: OTHER | Age: 27
End: 2017-05-16

## 2017-05-16 LAB
ANION GAP SERPL CALCULATED.3IONS-SCNC: 13 MMOL/L (ref 3–14)
BUN SERPL-MCNC: 12 MG/DL (ref 7–30)
CALCIUM SERPL-MCNC: 9.3 MG/DL (ref 8.5–10.1)
CHLORIDE SERPL-SCNC: 103 MMOL/L (ref 94–109)
CHOLEST SERPL-MCNC: 214 MG/DL
CO2 SERPL-SCNC: 25 MMOL/L (ref 20–32)
CREAT SERPL-MCNC: 0.87 MG/DL (ref 0.66–1.25)
GFR SERPL CREATININE-BSD FRML MDRD: NORMAL ML/MIN/1.7M2
GLUCOSE SERPL-MCNC: 79 MG/DL (ref 70–99)
HDLC SERPL-MCNC: 62 MG/DL
LDLC SERPL CALC-MCNC: 125 MG/DL
NONHDLC SERPL-MCNC: 152 MG/DL
POTASSIUM SERPL-SCNC: 3.7 MMOL/L (ref 3.4–5.3)
SODIUM SERPL-SCNC: 141 MMOL/L (ref 133–144)
TRIGL SERPL-MCNC: 136 MG/DL
TSH SERPL DL<=0.005 MIU/L-ACNC: 3.08 MU/L (ref 0.4–4)

## 2017-05-16 ASSESSMENT — ANXIETY QUESTIONNAIRES: GAD7 TOTAL SCORE: 10

## 2017-05-16 ASSESSMENT — PATIENT HEALTH QUESTIONNAIRE - PHQ9: SUM OF ALL RESPONSES TO PHQ QUESTIONS 1-9: 14

## 2017-05-16 NOTE — TELEPHONE ENCOUNTER
2:47 PM    Reason for Call: Phone Call    Description: Patient was in to see provider yesterday for follow on HTN, depression/anxiety and was prescribed lisinopril. [facility] received two Rx one for 30 mg and one for 40 mg. If someone could please call and verify the order that patient was suppose to receive. [facility] number is [phone].    Was an appointment offered for this call? No    Preferred method for responding to this message: Telephone Call    If we cannot reach you directly, may we leave a detailed response at the number you provided? Yes    Can this message wait until your PCP/provider returns, if available today? Jess,     Angélica Haynes

## 2017-05-16 NOTE — TELEPHONE ENCOUNTER
Khurram narvaez looked in note and was prescribed 40mg of lisinopril # 30 pharmacy will cancel the 30mg.    Pamela M Lechevalier LPN

## 2017-05-17 LAB — DEPRECATED CALCIDIOL+CALCIFEROL SERPL-MC: 30 UG/L (ref 20–75)

## 2017-05-30 ENCOUNTER — TRANSFERRED RECORDS (OUTPATIENT)
Dept: HEALTH INFORMATION MANAGEMENT | Facility: HOSPITAL | Age: 27
End: 2017-05-30

## 2017-06-08 ENCOUNTER — OFFICE VISIT (OUTPATIENT)
Dept: FAMILY MEDICINE | Facility: OTHER | Age: 27
End: 2017-06-08
Attending: NURSE PRACTITIONER
Payer: COMMERCIAL

## 2017-06-08 VITALS
SYSTOLIC BLOOD PRESSURE: 128 MMHG | BODY MASS INDEX: 35.68 KG/M2 | WEIGHT: 278 LBS | TEMPERATURE: 98.8 F | HEIGHT: 74 IN | HEART RATE: 71 BPM | RESPIRATION RATE: 18 BRPM | DIASTOLIC BLOOD PRESSURE: 72 MMHG | OXYGEN SATURATION: 96 %

## 2017-06-08 DIAGNOSIS — I10 BENIGN ESSENTIAL HYPERTENSION: ICD-10-CM

## 2017-06-08 DIAGNOSIS — E78.5 HYPERLIPIDEMIA LDL GOAL <100: ICD-10-CM

## 2017-06-08 DIAGNOSIS — F41.9 ANXIETY: ICD-10-CM

## 2017-06-08 DIAGNOSIS — F33.0 MILD EPISODE OF RECURRENT MAJOR DEPRESSIVE DISORDER (H): ICD-10-CM

## 2017-06-08 DIAGNOSIS — M75.111 INCOMPLETE ROTATOR CUFF TEAR OR RUPTURE OF RIGHT SHOULDER, NOT SPECIFIED AS TRAUMATIC: ICD-10-CM

## 2017-06-08 DIAGNOSIS — S46.211D BICEPS MUSCLE TEAR, RIGHT, SUBSEQUENT ENCOUNTER: ICD-10-CM

## 2017-06-08 DIAGNOSIS — Z01.818 PREOP GENERAL PHYSICAL EXAM: Primary | ICD-10-CM

## 2017-06-08 DIAGNOSIS — J45.30 MILD PERSISTENT ASTHMA WITHOUT COMPLICATION: ICD-10-CM

## 2017-06-08 DIAGNOSIS — E03.4 HYPOTHYROIDISM DUE TO ACQUIRED ATROPHY OF THYROID: ICD-10-CM

## 2017-06-08 LAB
ALBUMIN SERPL-MCNC: 4.2 G/DL (ref 3.4–5)
ALP SERPL-CCNC: 109 U/L (ref 40–150)
ALT SERPL W P-5'-P-CCNC: 82 U/L (ref 0–70)
ANION GAP SERPL CALCULATED.3IONS-SCNC: 10 MMOL/L (ref 3–14)
AST SERPL W P-5'-P-CCNC: 40 U/L (ref 0–45)
BILIRUB SERPL-MCNC: 0.8 MG/DL (ref 0.2–1.3)
BUN SERPL-MCNC: 13 MG/DL (ref 7–30)
CALCIUM SERPL-MCNC: 9.4 MG/DL (ref 8.5–10.1)
CHLORIDE SERPL-SCNC: 104 MMOL/L (ref 94–109)
CO2 SERPL-SCNC: 24 MMOL/L (ref 20–32)
CREAT SERPL-MCNC: 0.99 MG/DL (ref 0.66–1.25)
ERYTHROCYTE [DISTWIDTH] IN BLOOD BY AUTOMATED COUNT: 11.5 % (ref 10–15)
GFR SERPL CREATININE-BSD FRML MDRD: ABNORMAL ML/MIN/1.7M2
GLUCOSE SERPL-MCNC: 75 MG/DL (ref 70–99)
HCT VFR BLD AUTO: 47 % (ref 40–53)
HGB BLD-MCNC: 16.5 G/DL (ref 13.3–17.7)
MCH RBC QN AUTO: 31.8 PG (ref 26.5–33)
MCHC RBC AUTO-ENTMCNC: 35.1 G/DL (ref 31.5–36.5)
MCV RBC AUTO: 91 FL (ref 78–100)
PLATELET # BLD AUTO: 228 10E9/L (ref 150–450)
POTASSIUM SERPL-SCNC: 4.3 MMOL/L (ref 3.4–5.3)
PROT SERPL-MCNC: 7.9 G/DL (ref 6.8–8.8)
RBC # BLD AUTO: 5.19 10E12/L (ref 4.4–5.9)
SODIUM SERPL-SCNC: 138 MMOL/L (ref 133–144)
WBC # BLD AUTO: 9 10E9/L (ref 4–11)

## 2017-06-08 PROCEDURE — 36415 COLL VENOUS BLD VENIPUNCTURE: CPT | Performed by: NURSE PRACTITIONER

## 2017-06-08 PROCEDURE — 80053 COMPREHEN METABOLIC PANEL: CPT | Performed by: NURSE PRACTITIONER

## 2017-06-08 PROCEDURE — 85027 COMPLETE CBC AUTOMATED: CPT | Performed by: NURSE PRACTITIONER

## 2017-06-08 PROCEDURE — 99214 OFFICE O/P EST MOD 30 MIN: CPT | Performed by: NURSE PRACTITIONER

## 2017-06-08 ASSESSMENT — PAIN SCALES - GENERAL: PAINLEVEL: MODERATE PAIN (5)

## 2017-06-08 NOTE — NURSING NOTE
"Chief Complaint   Patient presents with     Pre-Op Exam       Initial /72 (BP Location: Left arm, Patient Position: Sitting, Cuff Size: Adult Large)  Pulse 71  Temp 98.8  F (37.1  C) (Tympanic)  Resp 18  Ht 6' 2\" (1.88 m)  Wt 278 lb (126.1 kg)  SpO2 96%  BMI 35.69 kg/m2 Estimated body mass index is 35.69 kg/(m^2) as calculated from the following:    Height as of this encounter: 6' 2\" (1.88 m).    Weight as of this encounter: 278 lb (126.1 kg).  Medication Reconciliation: complete   Joy Dennis      "

## 2017-06-08 NOTE — PATIENT INSTRUCTIONS
RECOMMENDATIONS:                                                        Cardiovascular Risk  Performs 4 METs exercise without symptoms (Climb a flight of stairs and Walk on level ground at 15 minutes per mile (4 miles/hour)) .   Patient is already on a Beta Blocker. Continue Betablocker therapy after surgery, using Beta blocker order set as necessary for NPO status.      Pulmonary Risk  Restart symbicort  Incentive spirometry post op  Advised to stop chewing tobacco.       --Patient is to take all scheduled medications on the day of surgery EXCEPT for modifications listed below.    ACE Inhibitor or Angiotensin Receptor Blocker (ARB) Use  Ace inhibitor (LISINOPRIL) or Angiotensin Receptor Blocker (ARB) and should HOLD this medication for the 24 hours prior to surgery.      Before Your Surgery      Call your surgeon if there is any change in your health. This includes signs of a cold or flu (such as a sore throat, runny nose, cough, rash or fever).    Do not smoke, drink alcohol or take over the counter medicine (unless your surgeon or primary care doctor tells you to) for the 24 hours before and after surgery.    If you take prescribed drugs: Follow your doctor s orders about which medicines to take and which to stop until after surgery.    Eating and drinking prior to surgery: follow the instructions from your surgeon    Take a shower or bath the night before surgery. Use the soap your surgeon gave you to gently clean your skin. If you do not have soap from your surgeon, use your regular soap. Do not shave or scrub the surgery site.  Wear clean pajamas and have clean sheets on your bed.

## 2017-06-08 NOTE — PROGRESS NOTES
[facility]  [address]  [facility] [address]  [phone]  Dept: [phone]    PRE-OP EVALUATION:  Today's date: 2017    Kannan Ordonez (: 1990) presents for pre-operative evaluation assessment as requested by Dr. Len Amado.  He requires evaluation and anesthesia risk assessment prior to undergoing surgery/procedure for treatment of right shoulder pain .  Proposed procedure: Surgical repair of right rotator cuff and right bicep tear    Date of Surgery/ Procedure: 2017  Time of Surgery/ Procedure: [facility]/Surgical Facility: [place], [place]  See HUC  Primary Physician: Isabela Gonzalez  Type of Anesthesia Anticipated: to be determined    Patient has a Health Care Directive or Living Will:  NO    1. NO - Do you have a history of heart attack, stroke, stent, bypass or surgery on an artery in the head, neck, heart or legs?  2. NO - Do you ever have any pain or discomfort in your chest?  3. NO - Do you have a history of  Heart Failure?  4. NO - Are you troubled by shortness of breath when: walking on the level, up a slight hill or at night?  5. NO - Do you currently have a cold, bronchitis or other respiratory infection?  6. NO - Do you have a cough, shortness of breath or wheezing?  7. NO - Do you sometimes get pains in the calves of your legs when you walk?  8. NO - Do you or anyone in your family have previous history of blood clots?  9. NO - Do you or does anyone in your family have a serious bleeding problem such as prolonged bleeding following surgeries or cuts?  10. NO - Have you ever had problems with anemia or been told to take iron pills?  11. NO - Have you had any abnormal blood loss such as black, tarry or bloody stools, or abnormal vaginal bleeding?  12. NO - Have you ever had a blood transfusion?  13. NO - Have you or any of your relatives ever had problems with anesthesia?  14. NO - Do you have sleep apnea,  excessive snoring or daytime drowsiness?  15. NO - Do you have any prosthetic heart valves?  16. NO - Do you have prosthetic joints?  17. NO - Is there any chance that you may be pregnant?      HPI:                                                      Brief HPI related to upcoming procedure: longstanding shoulder pain.  2 previous surgeries, pain remains.  MRI was completed with the following results:     RIGHT SHOULDER MRI     HISTORY:  Constant right shoulder pain, history of prior surgery on  the right shoulder.     COMPARISON: Today's study is compared to a prior examination which is  dated March 6, 2015.     TECHNIQUE:  Sagittal, coronal and axial images were obtained with a  combination of proton density and T2-weighted images.     FINDINGS:  There has been interval lateral clavicle excision with some  fluid in the space between the lateral clavicle and the acromion  process.  There is still some mass effect on the musculotendinous  junction of the supraspinatus.     There is severe thickening of and increased signal intensity within  the supraspinatus tendon and in the anterior infraspinatus tendon.  This is consistent with severe tendinosis which is much worse than on  the prior examination.  No full thickness tear is seen, but there is  some partial thickness tearing of the tendon, especially far  anteriorly.  The remainder of the cuff is intact.  There is no  significant muscle atrophy.  Biceps tendon is normally positioned  within the bicipital groove.  The intraarticular portion of the tendon  is intact.     There has been a previous superior labral repair.  Fixation devices  are seen in the superior glenoid.  A previously seen paralabral cyst  is no longer seen.  No definite recurrent labral tear is seen.     A small amount of fluid is seen in the subdeltoid bursa.     IMPRESSION:  1.  INTERVAL SUPERIOR LABRAL REPAIR WITH RESOLUTION OF PREVIOUSLY SEEN  PARALABRAL CYST.     Continued on page  2...           IMPRESSION:  (continued)  2.  PROGRESSION OF CHANGES IN THE SUPRASPINATUS AND ANTERIOR  INFRASPINATUS TENDON, CONSISTENT WITH TENDINOSIS.  THERE IS PARTIAL  THICKNESS UNDERSURFACE TEARING OF THE FAR ANTERIOR LATERAL  SUPRASPINATUS TENDON.     3.  SMALL AMOUNT OF FLUID IN THE SUBDELTOID BURSA.  Exam Date: Jun 27, 2016 01:45:00 PM  Author: WASHINGTON GRECO  This report is final and signed         HYPERTENSION - Patient has longstanding history of mod-severe HTN , currently denies any symptoms referable to elevated blood pressure. Specifically denies chest pain, palpitations, dyspnea, orthopnea, PND or peripheral edema. Blood pressure readings have been in normal range. Current medication regimen is as listed below. Patient denies any side effects of medication.      BP Readings from Last 6 Encounters:   06/08/17 128/72   05/15/17 (!) 150/91   02/13/17 132/70   12/07/16 140/80   11/07/16 142/80   08/22/16 148/95                                                                                                                                                                                           .  DEPRESSION - Patient has a long history of Depression of moderate severity requiring medication for control with recent symptoms being improved..Current symptoms of depression include none.                                                                                                                                                                                    .  HYPOTHYROIDISM - Patient has a longstanding history of chronic Hypothyroidism. Patient has been doing well, noting no tremor, insomnia, hair loss or changes in skin texture. Last TSH value of 3.08. Continues to take medications as directed, without adverse reactions or side effects.                                                                                                                                                                        Asthma:  Well controlled with current plan                                                   .    MEDICAL HISTORY:                                                      Patient Active Problem List    Diagnosis Date Noted     Anxiety 06/24/2016     Priority: Medium     Recurrent major depressive disorder (H) 06/24/2016     Priority: Medium     ACP (advance care planning) 05/26/2016     Priority: Medium     Advance Care Planning 5/26/2016: ACP Review of Chart / Resources Provided:  Reviewed chart for advance care plan.  Kannan Ordonez has been provided information and resources to begin or update their advance care plan.  Added by ELVA IBARRA             Hyperlipidemia LDL goal <100 12/16/2015     Priority: Medium     Hypothyroidism due to acquired atrophy of thyroid 05/01/2015     Priority: Medium     Benign essential hypertension 03/03/2015     Priority: Medium     Mild persistent asthma 03/03/2015     Priority: Medium     Asthma action plan updated 3/3/2015        Past Medical History:   Diagnosis Date     Anxiety 6/24/2016     Asthma      Hyperlipidemia LDL goal <100 12/16/2015     Hypertension 3/3/2015     Hypothyroidism 5/1/2015     Recurrent major depressive disorder (H) 6/24/2016     Past Surgical History:   Procedure Laterality Date     ORTHOPEDIC SURGERY  2015    r shoulder teat bone spur     ORTHOPEDIC SURGERY  2-2016    AC joint right     ORTHOPEDIC SURGERY  2016    Rt labrial tear     Current Outpatient Prescriptions   Medication Sig Dispense Refill     citalopram (CELEXA) 40 MG tablet Take 1 tablet (40 mg) by mouth daily 30 tablet 1     lisinopril (PRINIVIL/ZESTRIL) 40 MG tablet Take 1 tablet (40 mg) by mouth daily 30 tablet 5     aspirin 81 MG EC tablet Take 1 tablet (81 mg) by mouth daily 90 tablet 3     metoprolol (TOPROL-XL) 25 MG 24 hr tablet TAKE 1/2 TABLET(12.5 MG) BY MOUTH DAILY 15 tablet 10     albuterol (PROAIR HFA, PROVENTIL HFA, VENTOLIN HFA) 108 (90 BASE) MCG/ACT inhaler Inhale 2  "puffs into the lungs every 6 hours as needed for shortness of breath / dyspnea 1 Inhaler 1     budesonide-formoterol (SYMBICORT) 160-4.5 MCG/ACT inhaler Inhale 2 puffs into the lungs 2 times daily 3 Inhaler 1     levothyroxine (SYNTHROID, LEVOTHROID) 50 MCG tablet Take 1 tablet (50 mcg) by mouth daily 90 tablet 3     OTC products: None, except as noted above, no recent use of OTC ASA, NSAIDS or Steroids and no use of herbal medications or other supplements    Allergies   Allergen Reactions     Morphine Sulfate Rash     Cats      Other reaction(s): Eye Irritation, Sneezing     Dogs      Other reaction(s): Eye Irritation, Sneezing      Latex Allergy: NO    Social History   Substance Use Topics     Smoking status: Never Smoker     Smokeless tobacco: Current User     Types: Chew     Alcohol use Yes      Comment: occ     History   Drug Use No       REVIEW OF SYSTEMS:                                                    C: NEGATIVE for fever, chills, change in weight  I: NEGATIVE for worrisome rashes, moles or lesions  E: NEGATIVE for vision changes or irritation  E/M: NEGATIVE for ear, mouth and throat problems  R: NEGATIVE for significant cough or SOB  CV: NEGATIVE for chest pain, palpitations or peripheral edema  GI: NEGATIVE for nausea, abdominal pain, heartburn, or change in bowel habits  : NEGATIVE for frequency, dysuria, or hematuria  M: POSITIVE:  Right shoulder pain and weakness  N: NEGATIVE for weakness, dizziness or paresthesias  E: NEGATIVE for temperature intolerance, skin/hair changes  H: NEGATIVE for bleeding problems  P: NEGATIVE for changes in mood or affect    EXAM:                                                    /72 (BP Location: Left arm, Patient Position: Sitting, Cuff Size: Adult Large)  Pulse 71  Temp 98.8  F (37.1  C) (Tympanic)  Resp 18  Ht 6' 2\" (1.88 m)  Wt 278 lb (126.1 kg)  SpO2 96%  BMI 35.69 kg/m2    GENERAL APPEARANCE: healthy, alert and no distress     HENT: ear canals and " TM's normal and nose and mouth without ulcers or lesions     NECK: no adenopathy, no asymmetry, masses, or scars and thyroid normal to palpation     RESP: lungs clear to auscultation - no rales, rhonchi or wheezes     CV: regular rates and rhythm, normal S1 S2, no S3 or S4 and no murmur, click or rub     ABDOMEN:  soft, nontender, no HSM or masses and bowel sounds normal     MS: extremities normal- no gross deformities noted, no evidence of inflammation in joints, FROM in all extremities.     SKIN: no suspicious lesions or rashes     NEURO: Normal strength and tone, sensory exam grossly normal, mentation intact and speech normal     PSYCH: mentation appears normal. and affect normal/bright    DIAGNOSTICS:                                                      Results for orders placed or performed in visit on 06/08/17   CBC with platelets   Result Value Ref Range    WBC 9.0 4.0 - 11.0 10e9/L    RBC Count 5.19 4.4 - 5.9 10e12/L    Hemoglobin 16.5 13.3 - 17.7 g/dL    Hematocrit 47.0 40.0 - 53.0 %    MCV 91 78 - 100 fl    MCH 31.8 26.5 - 33.0 pg    MCHC 35.1 31.5 - 36.5 g/dL    RDW 11.5 10.0 - 15.0 %    Platelet Count 228 150 - 450 10e9/L   Comprehensive metabolic panel   Result Value Ref Range    Sodium 138 133 - 144 mmol/L    Potassium 4.3 3.4 - 5.3 mmol/L    Chloride 104 94 - 109 mmol/L    Carbon Dioxide 24 20 - 32 mmol/L    Anion Gap 10 3 - 14 mmol/L    Glucose 75 70 - 99 mg/dL    Urea Nitrogen 13 7 - 30 mg/dL    Creatinine 0.99 0.66 - 1.25 mg/dL    GFR Estimate >90  Non  GFR Calc   >60 mL/min/1.7m2    GFR Estimate If Black >90   GFR Calc   >60 mL/min/1.7m2    Calcium 9.4 8.5 - 10.1 mg/dL    Bilirubin Total 0.8 0.2 - 1.3 mg/dL    Albumin 4.2 3.4 - 5.0 g/dL    Protein Total 7.9 6.8 - 8.8 g/dL    Alkaline Phosphatase 109 40 - 150 U/L    ALT 82 (H) 0 - 70 U/L    AST 40 0 - 45 U/L         Recent Labs   Lab Test  05/15/17   1630  02/13/17   1020   05/26/16   1603   02/02/16   1511    07/07/15   1612   HGB   --    --    --   16.5   --   16.7   < >  16.3   PLT   --    --    --   220   --   239   < >  227   INR   --    --    --    --    --    --    --   1.0   NA  141  139   < >   --    < >  137   < >  142   POTASSIUM  3.7  4.6   < >   --    < >  3.8   < >  4.1   CR  0.87  0.86   < >   --    < >  1.19   < >  0.96    < > = values in this interval not displayed.        IMPRESSION:                                                    Reason for surgery/procedure: right shoulder pain  Diagnosis/reason for consult: anesthesia risk assessment    The proposed surgical procedure is considered INTERMEDIATE risk.    REVISED CARDIAC RISK INDEX  The patient has the following serious cardiovascular risks for perioperative complications such as (MI, PE, VFib and 3  AV Block):  No serious cardiac risks  INTERPRETATION: 0 risks: Class I (very low risk - 0.4% complication rate)    The patient has the following additional risks for perioperative complications:  No identified additional risks      ICD-10-CM    1. Preop general physical exam Z01.818 CBC with platelets     Comprehensive metabolic panel   2. Incomplete rotator cuff tear or rupture of right shoulder, not specified as traumatic M75.111    3. Biceps muscle tear, right, subsequent encounter S46.111D    4. Benign essential hypertension I10    5. Hyperlipidemia LDL goal <100 E78.5    6. Mild persistent asthma without complication J45.30    7. Hypothyroidism due to acquired atrophy of thyroid E03.4    8. Mild episode of recurrent major depressive disorder (H) F33.0    9. Anxiety F41.9        RECOMMENDATIONS:                                                        Cardiovascular Risk  Performs 4 METs exercise without symptoms (Climb a flight of stairs and Walk on level ground at 15 minutes per mile (4 miles/hour)) .   Patient is already on a Beta Blocker. Continue Betablocker therapy after surgery, using Beta blocker order set as necessary for NPO  status.      Pulmonary Risk  Incentive spirometry post op  Advised to stop chewing tobacco.   Continue inhalers to maximize lung function prior to procedure      --Patient is to take all scheduled medications on the day of surgery EXCEPT for modifications listed below.    ACE Inhibitor or Angiotensin Receptor Blocker (ARB) Use  Ace inhibitor (LISINOPRIL) or Angiotensin Receptor Blocker (ARB) and should HOLD this medication for the 24 hours prior to surgery.      APPROVAL GIVEN to proceed with proposed procedure, without further diagnostic evaluation       Signed Electronically by: Isabela Gonzalez NP    Copy of this evaluation report is provided to requesting physician.    [facility] Preop Guidelines

## 2017-06-08 NOTE — MR AVS SNAPSHOT
After Visit Summary   6/8/2017    Kannan Ordonez    MRN: [id number]           Patient Information     Date Of Birth          1990        Visit Information        Provider Department      6/8/2017 9:00 AM Isabela Gonzalez NP [facility]        Today's Diagnoses     Preop general physical exam    -  1    Incomplete rotator cuff tear or rupture of right shoulder, not specified as traumatic        Biceps muscle tear, right, subsequent encounter        Benign essential hypertension        Hyperlipidemia LDL goal <100        Mild persistent asthma without complication        Hypothyroidism due to acquired atrophy of thyroid        Mild episode of recurrent major depressive disorder (H)        Anxiety          Care Instructions      RECOMMENDATIONS:                                                        Cardiovascular Risk  Performs 4 METs exercise without symptoms (Climb a flight of stairs and Walk on level ground at 15 minutes per mile (4 miles/hour)) .   Patient is already on a Beta Blocker. Continue Betablocker therapy after surgery, using Beta blocker order set as necessary for NPO status.      Pulmonary Risk  Restart symbicort  Incentive spirometry post op  Advised to stop chewing tobacco.       --Patient is to take all scheduled medications on the day of surgery EXCEPT for modifications listed below.    ACE Inhibitor or Angiotensin Receptor Blocker (ARB) Use  Ace inhibitor (LISINOPRIL) or Angiotensin Receptor Blocker (ARB) and should HOLD this medication for the 24 hours prior to surgery.      Before Your Surgery      Call your surgeon if there is any change in your health. This includes signs of a cold or flu (such as a sore throat, runny nose, cough, rash or fever).    Do not smoke, drink alcohol or take over the counter medicine (unless your surgeon or primary care doctor tells you to) for the 24 hours before and after surgery.    If you take prescribed drugs:  "Follow your doctor s orders about which medicines to take and which to stop until after surgery.    Eating and drinking prior to surgery: follow the instructions from your surgeon    Take a shower or bath the night before surgery. Use the soap your surgeon gave you to gently clean your skin. If you do not have soap from your surgeon, use your regular soap. Do not shave or scrub the surgery site.  Wear clean pajamas and have clean sheets on your bed.           Follow-ups after your visit        Who to contact     If you have questions or need follow up information about today's clinic visit or your schedule please contact [facility] directly at [phone].  Normal or non-critical lab and imaging results will be communicated to you by MyChart, letter or phone within 4 business days after the clinic has received the results. If you do not hear from us within 7 days, please contact the clinic through [employer] or phone. If you have a critical or abnormal lab result, we will notify you by phone as soon as possible.  Submit refill requests through [employer] or call your pharmacy and they will forward the refill request to us. Please allow 3 business days for your refill to be completed.          Additional Information About Your Visit        MyChart Information     [employer] lets you send messages to your doctor, view your test results, renew your prescriptions, schedule appointments and more. To sign up, go to [email or web address] . Click on \"Log in\" on the left side of the screen, which will take you to the Welcome page. Then click on \"Sign up Now\" on the right side of the page.     You will be asked to enter the access code listed below, as well as some personal information. Please follow the directions to create your username and password.     Your access code is: [email or web address]  Expires: 2017  5:02 PM     Your access code will  in 90 days. If you need help or a new code, please call your " "[facility] or [phone].        Care EveryWhere ID     This is your Care EveryWhere ID. This could be used by other organizations to access your [place] medical records  [id number]        Your Vitals Were     Pulse Temperature Respirations Height Pulse Oximetry BMI (Body Mass Index)    71 98.8  F (37.1  C) (Tympanic) 18 6' 2\" (1.88 m) 96% 35.69 kg/m2       Blood Pressure from Last 3 Encounters:   06/08/17 128/72   05/15/17 (!) 150/91   02/13/17 132/70    Weight from Last 3 Encounters:   06/08/17 278 lb (126.1 kg)   05/15/17 280 lb (127 kg)   02/13/17 262 lb 3.2 oz (118.9 kg)              We Performed the Following     CBC with platelets     Comprehensive metabolic panel        Primary Care Provider Office Phone # Fax #    Isabela Gonzalez [phone]       [facility] [address] [place]  [facility] [address]        Thank you!     Thank you for choosing [facility] (formerly [facility]  for your care. Our goal is always to provide you with excellent care. Hearing back from our patients is one way we can continue to improve our services. Please take a few minutes to complete the written survey that you may receive in the mail after your visit with us. Thank you!             Your Updated Medication List - Protect others around you: Learn how to safely use, store and throw away your medicines at www.disposemymeds.org.          This list is accurate as of: 6/8/17  9:19 AM.  Always use your most recent med list.                   Brand Name Dispense Instructions for use    albuterol 108 (90 BASE) MCG/ACT Inhaler    PROAIR HFA/PROVENTIL HFA/VENTOLIN HFA    1 Inhaler    Inhale 2 puffs into the lungs every 6 hours as needed for shortness of breath / dyspnea       aspirin 81 MG EC tablet     90 tablet    Take 1 tablet (81 mg) by mouth daily       budesonide-formoterol 160-4.5 MCG/ACT Inhaler    SYMBICORT    3 Inhaler    Inhale 2 puffs into the lungs 2 times daily       citalopram 40 MG " tablet    celeXA    30 tablet    Take 1 tablet (40 mg) by mouth daily       levothyroxine 50 MCG tablet    SYNTHROID/LEVOTHROID    90 tablet    Take 1 tablet (50 mcg) by mouth daily       lisinopril 40 MG tablet    PRINIVIL/ZESTRIL    30 tablet    Take 1 tablet (40 mg) by mouth daily       metoprolol 25 MG 24 hr tablet    TOPROL-XL    15 tablet    TAKE 1/2 TABLET(12.5 MG) BY MOUTH DAILY

## 2017-06-27 ENCOUNTER — TRANSFERRED RECORDS (OUTPATIENT)
Dept: HEALTH INFORMATION MANAGEMENT | Facility: HOSPITAL | Age: 27
End: 2017-06-27

## 2017-08-05 ENCOUNTER — TRANSFERRED RECORDS (OUTPATIENT)
Dept: HEALTH INFORMATION MANAGEMENT | Facility: HOSPITAL | Age: 27
End: 2017-08-05

## 2017-08-08 ENCOUNTER — TRANSFERRED RECORDS (OUTPATIENT)
Dept: HEALTH INFORMATION MANAGEMENT | Facility: HOSPITAL | Age: 27
End: 2017-08-08

## 2017-09-05 DIAGNOSIS — F33.0 MILD EPISODE OF RECURRENT MAJOR DEPRESSIVE DISORDER (H): ICD-10-CM

## 2017-09-05 RX ORDER — CITALOPRAM HYDROBROMIDE 40 MG/1
40 TABLET ORAL DAILY
Qty: 30 TABLET | Refills: 1 | Status: SHIPPED | OUTPATIENT
Start: 2017-09-05 | End: 2017-10-24

## 2017-10-24 ENCOUNTER — OFFICE VISIT (OUTPATIENT)
Dept: FAMILY MEDICINE | Facility: OTHER | Age: 27
End: 2017-10-24
Attending: NURSE PRACTITIONER
Payer: COMMERCIAL

## 2017-10-24 VITALS
HEIGHT: 74 IN | BODY MASS INDEX: 34.27 KG/M2 | SYSTOLIC BLOOD PRESSURE: 122 MMHG | DIASTOLIC BLOOD PRESSURE: 70 MMHG | RESPIRATION RATE: 14 BRPM | WEIGHT: 267 LBS | HEART RATE: 76 BPM

## 2017-10-24 DIAGNOSIS — R35.89 POLYURIA: ICD-10-CM

## 2017-10-24 DIAGNOSIS — F33.1 MODERATE EPISODE OF RECURRENT MAJOR DEPRESSIVE DISORDER (H): Primary | ICD-10-CM

## 2017-10-24 DIAGNOSIS — E03.4 HYPOTHYROIDISM DUE TO ACQUIRED ATROPHY OF THYROID: ICD-10-CM

## 2017-10-24 DIAGNOSIS — R63.1 POLYDIPSIA: ICD-10-CM

## 2017-10-24 DIAGNOSIS — Z72.0 TOBACCO ABUSE: ICD-10-CM

## 2017-10-24 DIAGNOSIS — Z71.6 TOBACCO ABUSE COUNSELING: ICD-10-CM

## 2017-10-24 DIAGNOSIS — N52.9 ERECTILE DYSFUNCTION, UNSPECIFIED ERECTILE DYSFUNCTION TYPE: ICD-10-CM

## 2017-10-24 DIAGNOSIS — E78.5 HYPERLIPIDEMIA LDL GOAL <100: ICD-10-CM

## 2017-10-24 DIAGNOSIS — J45.30 MILD PERSISTENT ASTHMA WITHOUT COMPLICATION: ICD-10-CM

## 2017-10-24 DIAGNOSIS — I10 BENIGN ESSENTIAL HYPERTENSION: ICD-10-CM

## 2017-10-24 PROBLEM — E55.9 VITAMIN D DEFICIENCY: Status: ACTIVE | Noted: 2017-10-24

## 2017-10-24 PROCEDURE — 99000 SPECIMEN HANDLING OFFICE-LAB: CPT | Performed by: NURSE PRACTITIONER

## 2017-10-24 PROCEDURE — 83036 HEMOGLOBIN GLYCOSYLATED A1C: CPT | Performed by: NURSE PRACTITIONER

## 2017-10-24 PROCEDURE — 84270 ASSAY OF SEX HORMONE GLOBUL: CPT | Mod: 90 | Performed by: NURSE PRACTITIONER

## 2017-10-24 PROCEDURE — 80048 BASIC METABOLIC PNL TOTAL CA: CPT | Performed by: NURSE PRACTITIONER

## 2017-10-24 PROCEDURE — 99214 OFFICE O/P EST MOD 30 MIN: CPT | Performed by: NURSE PRACTITIONER

## 2017-10-24 PROCEDURE — 84443 ASSAY THYROID STIM HORMONE: CPT | Performed by: NURSE PRACTITIONER

## 2017-10-24 PROCEDURE — 84403 ASSAY OF TOTAL TESTOSTERONE: CPT | Mod: 90 | Performed by: NURSE PRACTITIONER

## 2017-10-24 PROCEDURE — 36415 COLL VENOUS BLD VENIPUNCTURE: CPT | Performed by: NURSE PRACTITIONER

## 2017-10-24 RX ORDER — ESCITALOPRAM OXALATE 10 MG/1
10 TABLET ORAL DAILY
Qty: 30 TABLET | Refills: 3 | Status: SHIPPED | OUTPATIENT
Start: 2017-10-24 | End: 2017-11-28

## 2017-10-24 RX ORDER — BUDESONIDE AND FORMOTEROL FUMARATE DIHYDRATE 160; 4.5 UG/1; UG/1
2 AEROSOL RESPIRATORY (INHALATION) 2 TIMES DAILY
Qty: 3 INHALER | Refills: 1 | Status: SHIPPED | OUTPATIENT
Start: 2017-10-24 | End: 2018-08-23

## 2017-10-24 ASSESSMENT — ANXIETY QUESTIONNAIRES
5. BEING SO RESTLESS THAT IT IS HARD TO SIT STILL: NEARLY EVERY DAY
6. BECOMING EASILY ANNOYED OR IRRITABLE: SEVERAL DAYS
2. NOT BEING ABLE TO STOP OR CONTROL WORRYING: NEARLY EVERY DAY
7. FEELING AFRAID AS IF SOMETHING AWFUL MIGHT HAPPEN: NOT AT ALL
IF YOU CHECKED OFF ANY PROBLEMS ON THIS QUESTIONNAIRE, HOW DIFFICULT HAVE THESE PROBLEMS MADE IT FOR YOU TO DO YOUR WORK, TAKE CARE OF THINGS AT HOME, OR GET ALONG WITH OTHER PEOPLE: SOMEWHAT DIFFICULT
3. WORRYING TOO MUCH ABOUT DIFFERENT THINGS: NEARLY EVERY DAY
4. TROUBLE RELAXING: NEARLY EVERY DAY
GAD7 TOTAL SCORE: 15
1. FEELING NERVOUS, ANXIOUS, OR ON EDGE: MORE THAN HALF THE DAYS

## 2017-10-24 ASSESSMENT — ASTHMA QUESTIONNAIRES
QUESTION_1 LAST FOUR WEEKS HOW MUCH OF THE TIME DID YOUR ASTHMA KEEP YOU FROM GETTING AS MUCH DONE AT WORK, SCHOOL OR AT HOME: A LITTLE OF THE TIME
QUESTION_2 LAST FOUR WEEKS HOW OFTEN HAVE YOU HAD SHORTNESS OF BREATH: NOT AT ALL
ACT_TOTALSCORE: 23
QUESTION_3 LAST FOUR WEEKS HOW OFTEN DID YOUR ASTHMA SYMPTOMS (WHEEZING, COUGHING, SHORTNESS OF BREATH, CHEST TIGHTNESS OR PAIN) WAKE YOU UP AT NIGHT OR EARLIER THAN USUAL IN THE MORNING: ONCE OR TWICE
QUESTION_4 LAST FOUR WEEKS HOW OFTEN HAVE YOU USED YOUR RESCUE INHALER OR NEBULIZER MEDICATION (SUCH AS ALBUTEROL): NOT AT ALL
QUESTION_5 LAST FOUR WEEKS HOW WOULD YOU RATE YOUR ASTHMA CONTROL: COMPLETELY CONTROLLED

## 2017-10-24 ASSESSMENT — PATIENT HEALTH QUESTIONNAIRE - PHQ9: SUM OF ALL RESPONSES TO PHQ QUESTIONS 1-9: 13

## 2017-10-24 NOTE — NURSING NOTE
"Chief Complaint   Patient presents with     Anxiety     Thyroid Problem       Initial /70 (BP Location: Right arm, Patient Position: Sitting, Cuff Size: Adult Large)  Pulse 76  Resp 14  Ht 6' 2\" (1.88 m)  Wt 267 lb (121.1 kg)  BMI 34.28 kg/m2 Estimated body mass index is 34.28 kg/(m^2) as calculated from the following:    Height as of this encounter: 6' 2\" (1.88 m).    Weight as of this encounter: 267 lb (121.1 kg).  Medication Reconciliation: complete     Heidy Alvarez      "

## 2017-10-24 NOTE — PATIENT INSTRUCTIONS
1. Moderate episode of recurrent major depressive disorder (H)  - DEPRESSION EDUCATION  - escitalopram (LEXAPRO) 10 MG tablet; Take 1 tablet (10 mg) by mouth daily  Dispense: 30 tablet; Refill: 3  - FU 3 mos - sooner as needed    2. Hypothyroidism due to acquired atrophy of thyroid  - TSH with free T4 reflex    3. Hyperlipidemia LDL goal <100  - Low fat diet  - aspirin 81 MG EC tablet; Take 1 tablet (81 mg) by mouth daily  Dispense: 90 tablet; Refill: 11    4. Mild persistent asthma without complication  - Albuterol inhaler as needed  - budesonide-formoterol (SYMBICORT) 160-4.5 MCG/ACT Inhaler; Inhale 2 puffs into the lungs 2 times daily  Dispense: 3 Inhaler; Refill: 1    5. Benign essential hypertension  - Basic metabolic panel  - aspirin 81 MG EC tablet; Take 1 tablet (81 mg) by mouth daily  Dispense: 90 tablet; Refill: 11    6. Tobacco abuse  - Tobacco Cessation - for Health Maintenance    7. Tobacco abuse counseling  - Tobacco Cessation - for Health Maintenance    8. Erectile dysfunction, unspecified erectile dysfunction type  - Testosterone Free and Total    9. Polyuria  - Hemoglobin A1c    10. Polydipsia  - Hemoglobin A1c        Regular exercise  See Patient Instructions        Yeimy Corona NP  [facility]          HOW TO QUIT SMOKING  Smoking is one of the hardest habits to break. About half of all those who have ever smoked have been able to quit, and most of those (about 70%) who still smoke want to quit. Here are some of the best ways to stop smoking.     KEEP TRYING:  It takes most smokers about 8 tries before they are finally able to fully quit. So, the more often you try and fail, the better your chance of quitting the next time! So, don't give up!    GO COLD TURKEY:  Most ex-smokers quit cold turkey. Trying to cut back gradually doesn't seem to work as well, perhaps because it continues the smoking habit. Also, it is possible to fool yourself by inhaling more while smoking fewer  cigarettes. This results in the same amount of nicotine in your body!    GET SUPPORT:  Support programs can make an important difference, especially for the heavy smoker. These groups offer lectures, methods to change your behavior and peer support. Call the free national Quitline for more information. 800-QUIT-NOW (970-705-9757). Low-cost or free programs are offered by many hospitals, local chapters of the American Lung Association (089-298-6523) and the American Cancer Society (616-757-6309). Support at home is important too. Non-smokers can help by offering praise and encouragement. If the smoker fails to quit, encourage them to try again!    OVER-THE-COUNTER MEDICINES:  For those who can't quit on their own, Nicotine Replacement Therapy (NRT) may make quitting much easier. Certain aids such as the nicotine patch, gum and lozenge are available without a prescription. However, it is best to use these under the guidance of your doctor. The skin patch provides a steady supply of nicotine to the body. Nicotine gum and lozenge gives temporary bursts of low levels of nicotine. Both methods take the edge off the craving for cigarettes. WARNING: If you feel symptoms of nicotine overdose, such as nausea, vomiting, dizziness, weakness, or fast heartbeat, stop using these and see your doctor.    PRESCRIPTION MEDICINES:  After evaluating your smoking patterns and prior attempts at quitting, your doctor may offer a prescription medicine such as bupropion (Zyban, Wellbutrin), varenicline (Chantix, Champix), a niocotine inhaler or nasal spray. Each has its unique advantage and side effects which your doctor can review with you.    HEALTH BENEFITS OF QUITTING:  The benefits of quitting start right away and keep improving the longer you go without smokin minutes: blood pressure and pulse return to normal  8 hours: oxygen levels return to normal  2 days: ability to smell and taste begins to improve as damaged nerves start  to regrow  2-3 weeks: circulation and lung function improves  1-9 months: decreased cough, congestion and shortness of breath; less tired  1 year: risk of heart attack decreases by half  5 years: risk of lung cancer decreases by half; risk of stroke becomes the same as a non-smoker  For information about how to quit smoking, visit the following links:  National Cancer [place] ,   Clearing the Air, Quit Smoking Today   - an online booklet. http://www.smokefree.gov/pubs/clearing_the_air.pdf  Smokefree.gov http://smokefree.gov/  QuitNet http://www.quitnet.com/    7148-5681 Syeda Cartagena, [address] [place] [address], [place] [id number]. All rights reserved. This information is not intended as a substitute for professional medical care. Always follow your healthcare professional's instructions.    The Benefits of Living Smoke Free  What do you want to gain from quitting? Check off some reasons to quit.  Health Benefits  ___ Reduce my risk of lung cancer, heart disease, chronic lung disease  ___ Have fewer wrinkles and softer skin  ___ Improve my sense of taste and smell  ___ For pregnant women reduce the risk of having a miscarriage, stillbirth, premature birth, or low-birth-weight baby  Personal Benefits  ___ Feel more in control of my life  ___ Have better-smelling hair, breath, clothes, home, and car  ___ Save time by not having to take smoke breaks, buy cigarettes, or hunt for a light  ___ Have whiter teeth  Family Benefits  ___ Reduce my children s respiratory tract infections  ___ Set a good example for my children  ___ Reduce my family s cancer risk  Financial Benefits  ___ Save hundreds of dollars each year that would be spent on cigarettes  ___ Save money on medical bills  ___ Save on life, health, and car insurance premiums    Those Dollars Add Up!  Cigarettes are expensive, and getting more expensive all the time. Do you realize how much money you are spending on cigarettes per year? What is the average amount  you spend on a pack of cigarettes? What is the average number of packs that you smoke per day? Using your answers to these questions, fill in this formula to help you find out:  ($ _____ per pack) ×  ( _____ number of packs per day) × (365 days) =  $ _____ yearly cost of smoking  Besides tobacco, there are other costs, including extra cleaning bills and replacement costs for clothing and furniture; medical expenses for smoking-related illnesses; and higher health, life, and car insurance premiums.    Cigars and Pipes Count Too!  Cigars and pipes are also dangerous. So are smokeless (chewing) tobacco and snuff. All of these products contain nicotine, a highly addictive substance that has harmful effects on your body. Quitting smoking means giving up all tobacco products.      4926-0408 Syeda Cartagena, [address] [place] [address], [place] [address]. All rights reserved. This information is not intended as a substitute for professional medical care. Always follow your healthcare professional's instructions.

## 2017-10-24 NOTE — PROGRESS NOTES
SUBJECTIVE:   Kannan Ordonez is a 27 year old male who presents to clinic today for the following health issues:      Hypertension Follow-up      Outpatient blood pressures are not being checked.    Low Salt Diet: no added salt    Depression and Anxiety Follow-Up    Status since last visit: No change    Other associated symptoms:None    Complicating factors:     Significant life event: No     Current substance abuse: None        PHQ-9 Score and MyChart F/U Questions 12/7/2016 5/15/2017 10/24/2017   Total Score 0 14 13   Q9: Suicide Ideation Not at all Not at all Not at all     ÁLVARO-7 SCORE 12/7/2016 5/15/2017 10/24/2017   Total Score 17 10 15     In the past two weeks have you had thoughts of suicide or self-harm?  No.    Do you have concerns about your personal safety or the safety of others?   No    PHQ-9  English  PHQ-9   Any Language  GAD7  Suicide Assessment Five-step Evaluation and Treatment (SAFE-T)        Asthma Follow-Up  Was ACT completed today?  YES        Hypothyroidism Follow-up      Since last visit, patient describes the following symptoms: Weight stable, no hair loss, no skin changes, no constipation, no loose stools        Muscle aches        Amount of exercise or physical activity: 4-5 days/week for an average of 45-60 minutes    Problems taking medications regularly: No    Medication side effects: none      Diet: low salt and low fat/cholesterol      Hyperlipidemia Follow-Up      Rate your low fat/cholesterol diet?: good    Taking statin?  No    Other lipid medications/supplements?:  none      Leg cramps - nightly    ED - trouble sustaining erection      Problem list and histories reviewed & adjusted, as indicated.  Additional history: as documented    Patient Active Problem List   Diagnosis     Benign essential hypertension     Mild persistent asthma     Hypothyroidism due to acquired atrophy of thyroid     Hyperlipidemia LDL goal <100     ACP (advance care planning)     Anxiety      Recurrent major depressive disorder (H)     Vitamin D deficiency     Past Surgical History:   Procedure Laterality Date     ORTHOPEDIC SURGERY  2015    r shoulder teat bone spur     ORTHOPEDIC SURGERY  2-2016    AC joint right     ORTHOPEDIC SURGERY  2016    Rt labrial tear     ORTHOPEDIC SURGERY  06/2017    revision decompression subpectoral biceps tenodesis        Social History   Substance Use Topics     Smoking status: Never Smoker     Smokeless tobacco: Current User     Types: Chew     Alcohol use Yes      Comment: occ     Family History   Problem Relation Age of Onset     DIABETES Mother      Hypertension Mother      Hyperlipidemia Mother      Coronary Artery Disease Father      Hyperlipidemia Father      Hypertension Father      Thyroid Disease No family hx of          Current Outpatient Prescriptions   Medication Sig Dispense Refill     albuterol (PROAIR HFA, PROVENTIL HFA, VENTOLIN HFA) 108 (90 BASE) MCG/ACT inhaler Inhale 2 puffs into the lungs every 6 hours as needed for shortness of breath / dyspnea 1 Inhaler 1     budesonide-formoterol (SYMBICORT) 160-4.5 MCG/ACT inhaler Inhale 2 puffs into the lungs 2 times daily 3 Inhaler 1     levothyroxine (SYNTHROID, LEVOTHROID) 50 MCG tablet Take 1 tablet (50 mcg) by mouth daily 90 tablet 3     citalopram (CELEXA) 40 MG tablet Take 1 tablet (40 mg) by mouth daily (Patient not taking: Reported on 10/24/2017) 30 tablet 1     lisinopril (PRINIVIL/ZESTRIL) 40 MG tablet Take 1 tablet (40 mg) by mouth daily (Patient not taking: Reported on 10/24/2017) 30 tablet 5     aspirin 81 MG EC tablet Take 1 tablet (81 mg) by mouth daily (Patient not taking: Reported on 10/24/2017) 90 tablet 3     metoprolol (TOPROL-XL) 25 MG 24 hr tablet TAKE 1/2 TABLET(12.5 MG) BY MOUTH DAILY (Patient not taking: Reported on 10/24/2017) 15 tablet 10     Allergies   Allergen Reactions     Morphine Sulfate Rash     Cats      Other reaction(s): Eye Irritation, Sneezing     Dogs      Other  reaction(s): Eye Irritation, Sneezing     Recent Labs   Lab Test  06/08/17   0922  05/15/17   1630  02/13/17   1020  11/07/16   1403   02/02/16   1519   08/28/15   1449   LDL   --   125*  107*  103*   < >   --    < >   --    HDL   --   62  46  47   < >   --    < >   --    TRIG   --   136  147  167*   < >   --    < >   --    ALT  82*   --    --    --    --   78*   --   51   CR  0.99  0.87  0.86  1.01   < >  1.19   < >  0.84   GFRESTIMATED  >90  Non  GFR Calc    >90  Non  GFR Calc    >90  Non  GFR Calc    89   < >  74   < >  >90  Non  GFR Calc     GFRESTBLACK  >90   GFR Calc    >90   GFR Calc    >90   GFR Calc    >90   GFR Calc     < >  89   < >  >90   GFR Calc     POTASSIUM  4.3  3.7  4.6  3.9   < >  3.8   < >  4.1   TSH   --   3.08  1.92  2.86   < >  4.41*   < >   --     < > = values in this interval not displayed.      BP Readings from Last 3 Encounters:   10/24/17 122/70   06/08/17 128/72   05/15/17 (!) 150/91    Wt Readings from Last 3 Encounters:   10/24/17 267 lb (121.1 kg)   06/08/17 278 lb (126.1 kg)   05/15/17 280 lb (127 kg)                  Labs reviewed in EPIC          Reviewed and updated as needed this visit by clinical staffTobacco  Allergies  Med Hx  Surg Hx  Fam Hx  Soc Hx      Reviewed and updated as needed this visit by Provider         ROS:  C: NEGATIVE for fever, chills, change in weight  I: NEGATIVE for worrisome rashes, moles or lesions  E: NEGATIVE for vision changes or irritation  E/M: NEGATIVE for ear, mouth and throat problems  R: NEGATIVE for significant cough or SOB  CV: NEGATIVE for chest pain, palpitations or peripheral edema  GI: NEGATIVE for nausea, abdominal pain, heartburn, or change in bowel habits  : NEGATIVE for frequency, dysuria, or hematuria  M: NEGATIVE for significant arthralgias or myalgia  N: NEGATIVE for weakness,  "dizziness or paresthesias  E: NEGATIVE for temperature intolerance, skin/hair changes  H: NEGATIVE for bleeding problems  P: NEGATIVE for changes in mood or affect    OBJECTIVE:     /70 (BP Location: Right arm, Patient Position: Sitting, Cuff Size: Adult Large)  Pulse 76  Resp 14  Ht 6' 2\" (1.88 m)  Wt 267 lb (121.1 kg)  BMI 34.28 kg/m2  Body mass index is 34.28 kg/(m^2).       GENERAL: healthy, alert and no distress  EYES: Eyes grossly normal to inspection, PERRL and conjunctivae and sclerae normal  HENT: ear canals and TM's normal, nose and mouth without ulcers or lesions  NECK: no adenopathy, no asymmetry, masses, or scars and thyroid normal to palpation  RESP: lungs clear to auscultation - no rales, rhonchi or wheezes  CV: regular rate and rhythm, normal S1 S2, no S3 or S4, no murmur, click or rub, no peripheral edema and peripheral pulses strong  MS: no gross musculoskeletal defects noted, no edema  SKIN: no suspicious lesions or rashes  PSYCH: mentation appears normal, affect normal/bright        ASSESSMENT/PLAN:     Hyperlipidemia; controlled   Plan:  No changes in the patient's current treatment plan    Hypertension; controlled   Associated with the following complications:    None   Plan:  No changes in the patient's current treatment plan    Depression; recurrent episode-- Moderate   Associated with the following complications:    None   Plan:  No changes in the patient's current treatment plan    Asthma: Intermittent   Plan:  No changes in the patient's current treatment plan  Asthma Action Plan given    Hypothyroidism; controlled/euthyroid   Plan:  No changes in the patient's current treatment plan        Tobacco Cessation:   reports that he has never smoked. His smokeless tobacco use includes Chew.  Tobacco Cessation Action Plan: Self help information given to patient            1. Moderate episode of recurrent major depressive disorder (H)  - DEPRESSION EDUCATION  - escitalopram (LEXAPRO) 10 " MG tablet; Take 1 tablet (10 mg) by mouth daily  Dispense: 30 tablet; Refill: 3  - FU 3 mos - sooner as needed    2. Hypothyroidism due to acquired atrophy of thyroid  - TSH with free T4 reflex    3. Hyperlipidemia LDL goal <100  - Low fat diet  - aspirin 81 MG EC tablet; Take 1 tablet (81 mg) by mouth daily  Dispense: 90 tablet; Refill: 11    4. Mild persistent asthma without complication  - Albuterol inhaler as needed  - budesonide-formoterol (SYMBICORT) 160-4.5 MCG/ACT Inhaler; Inhale 2 puffs into the lungs 2 times daily  Dispense: 3 Inhaler; Refill: 1    5. Benign essential hypertension  - Basic metabolic panel  - aspirin 81 MG EC tablet; Take 1 tablet (81 mg) by mouth daily  Dispense: 90 tablet; Refill: 11    6. Tobacco abuse  - Tobacco Cessation - for Health Maintenance    7. Tobacco abuse counseling  - Tobacco Cessation - for Health Maintenance    8. Erectile dysfunction, unspecified erectile dysfunction type  - Testosterone Free and Total    9. Polyuria  - Hemoglobin A1c    10. Polydipsia  - Hemoglobin A1c        Regular exercise  See Patient Instructions        Yeimy Corona NP  [facility]

## 2017-10-24 NOTE — LETTER
My Depression Action Plan  Name: Kannan Ordonez   Date of Birth 1990  Date: 10/24/2017    My doctor: Isabela Gonzalez   My clinic: [facility]  [address]  [place] [address]  [phone]          GREEN    ZONE   Good Control    What it looks like:     Things are going generally well. You have normal up s and down s. You may even feel depressed from time to time, but bad moods usually last less than a day.   What you need to do:  1. Continue to care for yourself (see self care plan)  2. Check your depression survival kit and update it as needed  3. Follow your physician s recommendations including any medication.  4. Do not stop taking medication unless you consult with your physician first.           YELLOW         ZONE Getting Worse    What it looks like:     Depression is starting to interfere with your life.     It may be hard to get out of bed; you may be starting to isolate yourself from others.    Symptoms of depression are starting to last most all day and this has happened for several days.     You may have suicidal thoughts but they are not constant.   What you need to do:     1. Call your care team, your response to treatment will improve if you keep your care team informed of your progress. Yellow periods are signs an adjustment may need to be made.     2. Continue your self-care, even if you have to fake it!    3. Talk to someone in your support network    4. Open up your depression survival kit           RED    ZONE Medical Alert - Get Help    What it looks like:     Depression is seriously interfering with your life.     You may experience these or other symptoms: You can t get out of bed most days, can t work or engage in other necessary activities, you have trouble taking care of basic hygiene, or basic responsibilities, thoughts of suicide or death that will not go away, self-injurious behavior.     What you need to do:  1. Call your  care team and request a same-day appointment. If they are not available (weekends or after hours) call your local crisis line, emergency room or 911.      Electronically signed by: Yeimy Corona, October 24, 2017    Depression Self Care Plan / Survival Kit    Self-Care for Depression  Here s the deal. Your body and mind are really not as separate as most people think.  What you do and think affects how you feel and how you feel influences what you do and think. This means if you do things that people who feel good do, it will help you feel better.  Sometimes this is all it takes.  There is also a place for medication and therapy depending on how severe your depression is, so be sure to consult with your medical provider and/ or Behavioral Health Consultant if your symptoms are worsening or not improving.     In order to better manage my stress, I will:    Exercise  Get some form of exercise, every day. This will help reduce pain and release endorphins, the  feel good  chemicals in your brain. This is almost as good as taking antidepressants!  This is not the same as joining a gym and then never going! (they count on that by the way ) It can be as simple as just going for a walk or doing some gardening, anything that will get you moving.      Hygiene   Maintain good hygiene (Get out of bed in the morning, Make your bed, Brush your teeth, Take a shower, and Get dressed like you were going to work, even if you are unemployed).  If your clothes don't fit try to get ones that do.    Diet  I will strive to eat foods that are good for me, drink plenty of water, and avoid excessive sugar, caffeine, alcohol, and other mood-altering substances.  Some foods that are helpful in depression are: complex carbohydrates, B vitamins, flaxseed, fish or fish oil, fresh fruits and vegetables.    Psychotherapy  I agree to participate in Individual Therapy (if recommended).    Medication  If prescribed medications, I agree to take them.   Missing doses can result in serious side effects.  I understand that drinking alcohol, or other illicit drug use, may cause potential side effects.  I will not stop my medication abruptly without first discussing it with my provider.    Staying Connected With Others  I will stay in touch with my friends, family members, and my primary care provider/team.    Use your imagination  Be creative.  We all have a creative side; it doesn t matter if it s oil painting, sand castles, or mud pies! This will also kick up the endorphins.    Witness Beauty  (AKA stop and smell the roses) Take a look outside, even in mid-winter. Notice colors, textures. Watch the squirrels and birds.     Service to others  Be of service to others.  There is always someone else in need.  By helping others we can  get out of ourselves  and remember the really important things.  This also provides opportunities for practicing all the other parts of the program.    Humor  Laugh and be silly!  Adjust your TV habits for less news and crime-drama and more comedy.    Control your stress  Try breathing deep, massage therapy, biofeedback, and meditation. Find time to relax each day.     My support system    Clinic Contact:  Phone number:    Contact 1:  Phone number:    Contact 2:  Phone number:    Jewish/:  Phone number:    Therapist:  Phone number:    Local crisis center:    Phone number:    Other community support:  Phone number:

## 2017-10-24 NOTE — LETTER
My Asthma Action Plan  Name: Kannan Ordonez   YOB: 1990  Date: 10/24/2017   My doctor: Yeimy Corona NP   My clinic: [facility]        My Control Medicine: symbicort 180/4.5  My Rescue Medicine albuterol inhaler   My Asthma Severity: intermittent  Avoid your asthma triggers: upper respiratory infections, animal dander, humidity and exercise or sports  None            GREEN ZONE   Good Control    I feel good    No cough or wheeze    Can work, sleep and play without asthma symptoms       Take your asthma control medicine every day.     1. If exercise triggers your asthma, take your rescue medication    15 minutes before exercise or sports, and    During exercise if you have asthma symptoms  2. Spacer to use with inhaler: If you have a spacer, make sure to use it with your inhaler             YELLOW ZONE Getting Worse  I have ANY of these:    I do not feel good    Cough or wheeze    Chest feels tight    Wake up at night   1. Keep taking your Green Zone medications  2. Start taking your rescue medicine:    every 20 minutes for up to 1 hour. Then every 4 hours for 24-48 hours.  3. If you stay in the Yellow Zone for more than 12-24 hours, contact your doctor.  4. If you do not return to the Green Zone in 12-24 hours or you get worse, start taking your oral steroid medicine if prescribed by your provider.           RED ZONE Medical Alert - Get Help  I have ANY of these:    I feel awful    Medicine is not helping    Breathing getting harder    Trouble walking or talking    Nose opens wide to breathe       1. Take your rescue medicine NOW  2. If your provider has prescribed an oral steroid medicine, start taking it NOW  3. Call your doctor NOW  4. If you are still in the Red Zone after 20 minutes and you have not reached your doctor:    Take your rescue medicine again and    Call 911 or go to the emergency room right away    See your regular doctor within 2 weeks of an Emergency Room or  Urgent Care visit for follow-up treatment.        Electronically signed by: Heidy Alvarez, October 24, 2017    Annual Reminders:  Meet with Asthma Educator,  Flu Shot in the Fall, consider Pneumonia Vaccination for patients with asthma (aged 19 and older).    Pharmacy:    [id number] [place] [place] [facility] [address]  [id number] - [place] [address] [facility] [address]  [id number] - [place], [place] - [address] AT [facility] OF  [address]                    Asthma Triggers  How To Control Things That Make Your Asthma Worse    Triggers are things that make your asthma worse.  Look at the list below to help you find your triggers and what you can do about them.  You can help prevent asthma flare-ups by staying away from your triggers.      Trigger                                                          What you can do   Cigarette Smoke  Tobacco smoke can make asthma worse. Do not allow smoking in your home, car or around you.  Be sure no one smokes at a child s day care or school.  If you smoke, ask your health care provider for ways to help you quit.  Ask family members to quit too.  Ask your health care provider for a referral to Quit Plan to help you quit smoking, or call 0-637-465-PLAN.     Colds, Flu, Bronchitis  These are common triggers of asthma. Wash your hands often.  Don t touch your eyes, nose or mouth.  Get a flu shot every year.     Dust Mites  These are tiny bugs that live in cloth or carpet. They are too small to see. Wash sheets and blankets in hot water every week.   Encase pillows and mattress in dust mite proof covers.  Avoid having carpet if you can. If you have carpet, vacuum weekly.   Use a dust mask and HEPA vacuum.   Pollen and Outdoor Mold  Some people are allergic to trees, grass, or weed pollen, or molds. Try to keep your windows closed.  Limit time out doors when pollen count is high.   Ask you health care provider  about taking medicine during allergy season.     Animal Dander  Some people are allergic to skin flakes, urine or saliva from pets with fur or feathers. Keep pets with fur or feathers out of your home.    If you can t keep the pet outdoors, then keep the pet out of your bedroom.  Keep the bedroom door closed.  Keep pets off cloth furniture and away from stuffed toys.     Mice, Rats, and Cockroaches  Some people are allergic to the waste from these pests.   Cover food and garbage.  Clean up spills and food crumbs.  Store grease in the refrigerator.   Keep food out of the bedroom.   Indoor Mold  This can be a trigger if your home has high moisture. Fix leaking faucets, pipes, or other sources of water.   Clean moldy surfaces.  Dehumidify basement if it is damp and smelly.   Smoke, Strong Odors, and Sprays  These can reduce air quality. Stay away from strong odors and sprays, such as perfume, powder, hair spray, paints, smoke incense, paint, cleaning products, candles and new carpet.   Exercise or Sports  Some people with asthma have this trigger. Be active!  Ask your doctor about taking medicine before sports or exercise to prevent symptoms.    Warm up for 5-10 minutes before and after sports or exercise.     Other Triggers of Asthma  Cold air:  Cover your nose and mouth with a scarf.  Sometimes laughing or crying can be a trigger.  Some medicines and food can trigger asthma.

## 2017-10-24 NOTE — MR AVS SNAPSHOT
After Visit Summary   10/24/2017    Kannan Ordonez    MRN: [id number]           Patient Information     Date Of Birth          1990        Visit Information        Provider Department      10/24/2017 1:30 PM Yeimy Corona NP [facility]        Today's Diagnoses     Moderate episode of recurrent major depressive disorder (H)    -  1    Hypothyroidism due to acquired atrophy of thyroid        Hyperlipidemia LDL goal <100        Mild persistent asthma without complication        Benign essential hypertension        Tobacco abuse        Tobacco abuse counseling        Erectile dysfunction, unspecified erectile dysfunction type        Polyuria        Polydipsia          Care Instructions        1. Moderate episode of recurrent major depressive disorder (H)  - DEPRESSION EDUCATION  - escitalopram (LEXAPRO) 10 MG tablet; Take 1 tablet (10 mg) by mouth daily  Dispense: 30 tablet; Refill: 3  - FU 3 mos - sooner as needed    2. Hypothyroidism due to acquired atrophy of thyroid  - TSH with free T4 reflex    3. Hyperlipidemia LDL goal <100  - Low fat diet  - aspirin 81 MG EC tablet; Take 1 tablet (81 mg) by mouth daily  Dispense: 90 tablet; Refill: 11    4. Mild persistent asthma without complication  - Albuterol inhaler as needed  - budesonide-formoterol (SYMBICORT) 160-4.5 MCG/ACT Inhaler; Inhale 2 puffs into the lungs 2 times daily  Dispense: 3 Inhaler; Refill: 1    5. Benign essential hypertension  - Basic metabolic panel  - aspirin 81 MG EC tablet; Take 1 tablet (81 mg) by mouth daily  Dispense: 90 tablet; Refill: 11    6. Tobacco abuse  - Tobacco Cessation - for Health Maintenance    7. Tobacco abuse counseling  - Tobacco Cessation - for Health Maintenance    8. Erectile dysfunction, unspecified erectile dysfunction type  - Testosterone Free and Total    9. Polyuria  - Hemoglobin A1c    10. Polydipsia  - Hemoglobin A1c        Regular exercise  See Patient Instructions        Yeimy  LEON Corona  [facility] RODO CHAMORRO          HOW TO QUIT SMOKING  Smoking is one of the hardest habits to break. About half of all those who have ever smoked have been able to quit, and most of those (about 70%) who still smoke want to quit. Here are some of the best ways to stop smoking.     KEEP TRYING:  It takes most smokers about 8 tries before they are finally able to fully quit. So, the more often you try and fail, the better your chance of quitting the next time! So, don't give up!    GO COLD TURKEY:  Most ex-smokers quit cold turkey. Trying to cut back gradually doesn't seem to work as well, perhaps because it continues the smoking habit. Also, it is possible to fool yourself by inhaling more while smoking fewer cigarettes. This results in the same amount of nicotine in your body!    GET SUPPORT:  Support programs can make an important difference, especially for the heavy smoker. These groups offer lectures, methods to change your behavior and peer support. Call the free national Quitline for more information. 800-QUIT-NOW (543-517-3767). Low-cost or free programs are offered by many hospitals, local chapters of the American Lung Association (892-424-0759) and the American Cancer Society (212-565-4364). Support at home is important too. Non-smokers can help by offering praise and encouragement. If the smoker fails to quit, encourage them to try again!    OVER-THE-COUNTER MEDICINES:  For those who can't quit on their own, Nicotine Replacement Therapy (NRT) may make quitting much easier. Certain aids such as the nicotine patch, gum and lozenge are available without a prescription. However, it is best to use these under the guidance of your doctor. The skin patch provides a steady supply of nicotine to the body. Nicotine gum and lozenge gives temporary bursts of low levels of nicotine. Both methods take the edge off the craving for cigarettes. WARNING: If you feel symptoms of nicotine overdose, such as nausea,  vomiting, dizziness, weakness, or fast heartbeat, stop using these and see your doctor.    PRESCRIPTION MEDICINES:  After evaluating your smoking patterns and prior attempts at quitting, your doctor may offer a prescription medicine such as bupropion (Zyban, Wellbutrin), varenicline (Chantix, Champix), a niocotine inhaler or nasal spray. Each has its unique advantage and side effects which your doctor can review with you.    HEALTH BENEFITS OF QUITTING:  The benefits of quitting start right away and keep improving the longer you go without smokin minutes: blood pressure and pulse return to normal  8 hours: oxygen levels return to normal  2 days: ability to smell and taste begins to improve as damaged nerves start to regrow  2-3 weeks: circulation and lung function improves  1-9 months: decreased cough, congestion and shortness of breath; less tired  1 year: risk of heart attack decreases by half  5 years: risk of lung cancer decreases by half; risk of stroke becomes the same as a non-smoker  For information about how to quit smoking, visit the following links:  National Cancer [place] ,   Clearing the Air, Quit Smoking Today   - an online booklet. http://www.smokefree.gov/pubs/clearing_the_air.pdf  Smokefree.gov http://smokefree.gov/  QuitNet http://www.quitnet.com/    1101-4585 [employer] \[facility]\"", [address] [place] [address]. All rights reserved. This information is not intended as a substitute for professional medical care. Always follow your healthcare professional's instructions.    The Benefits of Living Smoke Free  What do you want to gain from quitting? Check off some reasons to quit.  Health Benefits  ___ Reduce my risk of lung cancer, heart disease, chronic lung disease  ___ Have fewer wrinkles and softer skin  ___ Improve my sense of taste and smell  ___ For pregnant women--reduce the risk of having a miscarriage, stillbirth, premature birth, or low-birth-weight baby  Personal  Benefits  ___ Feel more in control of my life  ___ Have better-smelling hair, breath, clothes, home, and car  ___ Save time by not having to take smoke breaks, buy cigarettes, or hunt for a light  ___ Have whiter teeth  Family Benefits  ___ Reduce my children s respiratory tract infections  ___ Set a good example for my children  ___ Reduce my family s cancer risk  Financial Benefits  ___ Save hundreds of dollars each year that would be spent on cigarettes  ___ Save money on medical bills  ___ Save on life, health, and car insurance premiums    Those Dollars Add Up!  Cigarettes are expensive, and getting more expensive all the time. Do you realize how much money you are spending on cigarettes per year? What is the average amount you spend on a pack of cigarettes? What is the average number of packs that you smoke per day? Using your answers to these questions, fill in this formula to help you find out:  ($ _____ per pack) ×  ( _____ number of packs per day) × (365 days) =  $ _____ yearly cost of smoking  Besides tobacco, there are other costs, including extra cleaning bills and replacement costs for clothing and furniture; medical expenses for smoking-related illnesses; and higher health, life, and car insurance premiums.    Cigars and Pipes Count Too!  Cigars and pipes are also dangerous. So are smokeless (chewing) tobacco and snuff. All of these products contain nicotine, a highly addictive substance that has harmful effects on your body. Quitting smoking means giving up all tobacco products.      6487-9789 [facility], [address] [place] [address], [place] [address]. All rights reserved. This information is not intended as a substitute for professional medical care. Always follow your healthcare professional's instructions.          Follow-ups after your visit        Who to contact     If you have questions or need follow up information about today's clinic visit or your schedule please contact [facility]  [facility] directly at [phone].  Normal or non-critical lab and imaging results will be communicated to you by Zeristahart, letter or phone within 4 business days after the clinic has received the results. If you do not hear from us within 7 days, please contact the clinic through Zeristahart or phone. If you have a critical or abnormal lab result, we will notify you by phone as soon as possible.  Submit refill requests through Garmentory or call your pharmacy and they will forward the refill request to us. Please allow 3 business days for your refill to be completed.          Additional Information About Your Visit        ZeristahariConnectivity Information     Garmentory lets you send messages to your doctor, view your test results, renew your prescriptions, schedule appointments and more. To sign up, go to [email or web address] . Click on \"Log in\" on the left side of the screen, which will take you to the Welcome page. Then click on \"Sign up Now\" on the right side of the page.     You will be asked to enter the access code listed below, as well as some personal information. Please follow the directions to create your username and password.     Your access code is: [email or web address]  Expires: 2018  2:32 PM     Your access code will  in 90 days. If you need help or a new code, please call your [facility] clinic or [phone].        Care EveryWhere ID     This is your Care EveryWhere ID. This could be used by other organizations to access your [facility] medical records  [id number]        Your Vitals Were     Pulse Respirations Height BMI (Body Mass Index)          76 14 6' 2\" (1.88 m) 34.28 kg/m2         Blood Pressure from Last 3 Encounters:   10/24/17 122/70   17 128/72   05/15/17 (!) 150/91    Weight from Last 3 Encounters:   10/24/17 267 lb (121.1 kg)   17 278 lb (126.1 kg)   05/15/17 280 lb (127 kg)              We Performed the Following     Asthma Action Plan (AAP)     Basic metabolic panel     DEPRESSION EDUCATION  "    Hemoglobin A1c     Testosterone Free and Total     Tobacco Cessation - for Health Maintenance     TSH with free T4 reflex          Today's Medication Changes          These changes are accurate as of: 10/24/17  2:32 PM.  If you have any questions, ask your nurse or doctor.               Start taking these medicines.        Dose/Directions    escitalopram 10 MG tablet   Commonly known as:  LEXAPRO   Used for:  Moderate episode of recurrent major depressive disorder (H)   Started by:  Yeimy Corona NP        Dose:  10 mg   Take 1 tablet (10 mg) by mouth daily   Quantity:  30 tablet   Refills:  3         Stop taking these medicines if you haven't already. Please contact your care team if you have questions.     metoprolol 25 MG 24 hr tablet   Commonly known as:  TOPROL-XL   Stopped by:  Yeimy Corona NP                Where to get your medicines      These medications were sent to [facility] [address] [address]  AT [facility] [address]  [address] [facility] [address]     Phone:  [phone]     budesonide-formoterol 160-4.5 MCG/ACT Inhaler    escitalopram 10 MG tablet         Some of these will need a paper prescription and others can be bought over the counter.  Ask your nurse if you have questions.     You don't need a prescription for these medications     aspirin 81 MG EC tablet                Primary Care Provider Office Phone # Fax #    Isabela WHITLOCKDinah Gonzalez [phone]       [facility] [address]  [facility] [address]        Equal Access to Services     ZONIA ARAGON AH: Hadii moose saldañao Solutherali, waaxda luqadaha, qaybta kaalmada adeegelio zambranoay lilibeth tolentino adenathan staley. So [facility] [phone].    ATENCIÓN: Si habla español, tiene a underwood disposición servicios gratuitos de asistencia lingüística. Thu al [phone].    We comply with applicable federal civil rights laws and [place] laws. We do not  discriminate on the basis of race, color, national origin, age, disability, sex, sexual orientation, or gender identity.            Thank you!     Thank you for choosing [facility]  for your care. Our goal is always to provide you with excellent care. Hearing back from our patients is one way we can continue to improve our services. Please take a few minutes to complete the written survey that you may receive in the mail after your visit with us. Thank you!             Your Updated Medication List - Protect others around you: Learn how to safely use, store and throw away your medicines at www.disposemymeds.org.          This list is accurate as of: 10/24/17  2:32 PM.  Always use your most recent med list.                   Brand Name Dispense Instructions for use Diagnosis    albuterol 108 (90 BASE) MCG/ACT Inhaler    PROAIR HFA/PROVENTIL HFA/VENTOLIN HFA    1 Inhaler    Inhale 2 puffs into the lungs every 6 hours as needed for shortness of breath / dyspnea    Mild intermittent asthma without complication       aspirin 81 MG EC tablet     90 tablet    Take 1 tablet (81 mg) by mouth daily    Benign essential hypertension, Hyperlipidemia LDL goal <100       budesonide-formoterol 160-4.5 MCG/ACT Inhaler    SYMBICORT    3 Inhaler    Inhale 2 puffs into the lungs 2 times daily    Mild persistent asthma without complication       escitalopram 10 MG tablet    LEXAPRO    30 tablet    Take 1 tablet (10 mg) by mouth daily    Moderate episode of recurrent major depressive disorder (H)

## 2017-10-25 LAB
ANION GAP SERPL CALCULATED.3IONS-SCNC: 10 MMOL/L (ref 3–14)
BUN SERPL-MCNC: 13 MG/DL (ref 7–30)
CALCIUM SERPL-MCNC: 9.3 MG/DL (ref 8.5–10.1)
CHLORIDE SERPL-SCNC: 103 MMOL/L (ref 94–109)
CO2 SERPL-SCNC: 26 MMOL/L (ref 20–32)
CREAT SERPL-MCNC: 0.95 MG/DL (ref 0.66–1.25)
EST. AVERAGE GLUCOSE BLD GHB EST-MCNC: 100 MG/DL
GFR SERPL CREATININE-BSD FRML MDRD: >90 ML/MIN/1.7M2
GLUCOSE SERPL-MCNC: 79 MG/DL (ref 70–99)
HBA1C MFR BLD: 5.1 % (ref 4.3–6)
POTASSIUM SERPL-SCNC: 3.9 MMOL/L (ref 3.4–5.3)
SODIUM SERPL-SCNC: 139 MMOL/L (ref 133–144)
TSH SERPL DL<=0.005 MIU/L-ACNC: 1.94 MU/L (ref 0.4–4)

## 2017-10-25 ASSESSMENT — ASTHMA QUESTIONNAIRES: ACT_TOTALSCORE: 23

## 2017-10-25 ASSESSMENT — ANXIETY QUESTIONNAIRES: GAD7 TOTAL SCORE: 15

## 2017-10-27 ENCOUNTER — TELEPHONE (OUTPATIENT)
Dept: FAMILY MEDICINE | Facility: OTHER | Age: 27
End: 2017-10-27

## 2017-10-27 DIAGNOSIS — E03.9 HYPOTHYROID: ICD-10-CM

## 2017-10-27 DIAGNOSIS — N52.9 ED (ERECTILE DYSFUNCTION): Primary | ICD-10-CM

## 2017-10-27 DIAGNOSIS — N52.9 ERECTILE DYSFUNCTION, UNSPECIFIED ERECTILE DYSFUNCTION TYPE: ICD-10-CM

## 2017-10-27 LAB
SHBG SERPL-SCNC: 16 NMOL/L (ref 11–80)
TESTOST FREE SERPL-MCNC: 9.74 NG/DL (ref 4.7–24.4)
TESTOST SERPL-MCNC: 345 NG/DL (ref 240–950)

## 2017-10-27 NOTE — PROGRESS NOTES
Testosterone normal  Is having ED symptoms  Offer urology consult pls    Yeimy COLBERT-[facility]  [phone]

## 2017-10-27 NOTE — TELEPHONE ENCOUNTER
I wrote a note earlier  TSH levels can fluctuate  I would just recheck it in a few months    [facility]  [phone]

## 2017-10-27 NOTE — PROGRESS NOTES
Thyroid levels can fluctuate - I wouldn't worry about it.  Can recheck in 3 mos    Urology order signed    Yeimy COLBERT-[facility]  [phone]

## 2017-11-14 ENCOUNTER — OFFICE VISIT (OUTPATIENT)
Dept: UROLOGY | Facility: OTHER | Age: 27
End: 2017-11-14
Attending: NURSE PRACTITIONER
Payer: COMMERCIAL

## 2017-11-14 VITALS
DIASTOLIC BLOOD PRESSURE: 80 MMHG | SYSTOLIC BLOOD PRESSURE: 142 MMHG | HEART RATE: 65 BPM | OXYGEN SATURATION: 95 % | HEIGHT: 74 IN | WEIGHT: 267 LBS | TEMPERATURE: 97.8 F | BODY MASS INDEX: 34.27 KG/M2

## 2017-11-14 DIAGNOSIS — R06.83 SNORING: Primary | ICD-10-CM

## 2017-11-14 DIAGNOSIS — R68.82 LOW LIBIDO: ICD-10-CM

## 2017-11-14 DIAGNOSIS — G47.30 SLEEP APNEA, UNSPECIFIED TYPE: ICD-10-CM

## 2017-11-14 DIAGNOSIS — N52.9 ERECTILE DYSFUNCTION, UNSPECIFIED ERECTILE DYSFUNCTION TYPE: ICD-10-CM

## 2017-11-14 PROCEDURE — 99214 OFFICE O/P EST MOD 30 MIN: CPT | Performed by: NURSE PRACTITIONER

## 2017-11-14 RX ORDER — IBUPROFEN 800 MG/1
800 TABLET, FILM COATED ORAL
COMMUNITY
End: 2018-03-02

## 2017-11-14 NOTE — PATIENT INSTRUCTIONS
Please stop by the lab on a day it is convenient for you, before 10:00 am and get your blood drawn.  I referred you to the sleep lab, they should call you to schedule an appointment.    The following information is important for you to know regarding testosterone supplementation:  1) Testosterone may be supplied in the IM (shot) form, by patches, and by cream or gel.  2) The shot will be given every two weeks, relieving you of the responsibility of daily application.  The disadvantages include pain at injection site and possible fluctuations in mood, libido and energy.  3) Patches may be used, the primary disadvantage may be a rash that can develop from the patch.  4) Gels or creams must be placed daily.  They deliver a steady stream of testosterone.  It is important for you to know that you should follow package directions regarding how long you must wait before swimming or showering.  You should also not have skin to skin contact with children or females without washing the area first with soap and water.  You might otherwise transfer some testosterone accidentally to them.    5) Some issues to be aware of with testosterone supplementation:  You may experience acne or gynecomastia (tender or enlarged breasts), you may need to be screened for prostate cancer because it is possible that testosterone therapy could make prostate cancer worse , you could possibly experience a urinary outflow problem from your prostate, sleep apnea may become worse (Ok if treated with a Cpap already), your hematocrit may become abnormally high and fertility may be affected.      Testosterone Supplementation Expectations:    I follow the guideline from the Endocrinology Society, for your safety.    1)  After our initial visit, with a digital rectal exam, we may start your supplementation if the lab levels indicate a need for it.  Initial labs most often include a testosterone level, a free testosterone level, a PSA and a hematocrit.  2)   You must see me again in 3 months to check how you are doing.  This is a clinic appt.You should have your labs drawn about a week prior to your appointment.  This will include a testosterone level and a hematocrit.  Your labs must be done prior to 10:00 am and if you are receiving injections, it must be mid way between injections.  3) Six months after you start therapy you should have labs again, labs only, no clinic appointment.  This will usually be a testosterone level and a hematocrit. Follow the same rules for labs as above.  4) One year after you have started therapy you must see me in the clinic again.  You should have labs done about a week before.  These will include usually a testosterone level, a PSA and a hematocrit.    5) If you become well established with the therapy we can reduce your appointments now to yearly.  You may need labs at 6 months.    However... Please note that if you need dose adjustments, this schedule may change and I may need labs sooner.  (or if you have problems)  I am most concerned about your safety.  If you do not have labs and appointments as scheduled, I will not order more testosterone.           Evaluating Erectile Dysfunction     Doctor talking to man.     Many men feel embarrassed to talk to a doctor about erectile dysfunction (ED). This common problem can be treated, but only if your doctor knows about it. Your doctor will likely ask you questions about your ED. Whether you re asked or not, tell your doctor anything that might help your doctor understand the problem. Your doctor may do an exam and may run some tests to help find the cause of your ED.  A simple exam  A medical exam may help your doctor understand what is causing your problem. ED is sometimes the first sign of some other health problem, so your doctor may check your overall health. He or she may also examine your penis, scrotum, and testicles. Tell your doctor about all of the medicines you take, including  prescribed and over-the-counter medicines, as well as any herbs or supplements.  You may have some tests  Your doctor may recommend some or all of these tests:    Blood tests measure your levels of hormones or lipids (fatty substances in the blood, including cholesterol). Other tests check for diabetes or help show the health of your liver, kidneys, and prostate.    Blood flow tests check how well blood moves through your penis.    A rectal exam checks for an enlarged prostate gland. An enlarged prostate and ED have been linked in recent studies.    Additional tests check for other conditions that limit your ability to have intercourse.  Your treatment plan  Based on what you say and what any exam shows, your doctor will recommend a treatment plan. The first step may be to try ED medicines, since they help most men. If they don t help you, your doctor can suggest other kinds of treatment. You and your partner may also want to discuss which options would work best in your relationship. Treatment may include addressing the cause of health problems, such as lowering your cholesterol. And counseling may be recommended to talk about underlying emotional issues.  Date Last Reviewed: 1/1/2017 2000-2017 [employer]. [address] [place] [address]. All rights reserved. This information is not intended as a substitute for professional medical care. Always follow your healthcare professional's instructions.        Understanding Erectile Dysfunction    Erectile dysfunction (ED) is a problem getting an erection firm enough or keeping it long enough for intercourse. The problem can happen to any man at any age. But health problems that can lead to ED become more common as a man ages. Up to half of men over age 40 experience ED at some point.  Causes of ED  ED can have many causes. Most are physical. Some are emotional issues. Often, a combination of causes is involved. Causes of ED may  include:    Medical conditions such as diabetes or depression    Smoking tobacco or marijuana    Drinking too much alcohol    Side effects of medications    Injury to nerves or blood vessels    Emotional issues such as stress or relationship problems  ED can be treated  Prescription medications for ED are available. They help many men who try them. Depending upon the cause of the ED, though, medications may not be enough. In these cases, other treatment options are available. These include erectile aids and surgery. Your health care provider can tell you more about the treatment that is right for you. And new treatments for ED are being studied. No matter what the treatment you decide on, stay in touch with your doctor. If your symptoms persist, he or she may be able to adjust your current treatment or try something new.  Date Last Reviewed: 1/1/2017 2000-2017 [employer]. [address] [place] [address], [place] [address]. All rights reserved. This information is not intended as a substitute for professional medical care. Always follow your healthcare professional's instructions.        Erectile Dysfunction: Rebuilding Intimacy     Man and woman sitting together on couch, smiling.     Being intimate means being close as a couple, with sex as just one part of intimacy. A hug, a kind remark, or a gift can be very romantic, even if sex doesn t follow. So renew your intimacy along with your sex life. Learn to talk with, and listen to, your partner. And remember that your value as a man goes beyond what you do in bed.  Tips for intimacy  As you and your partner become closer to each other, you might find that you can enjoy sex more.    Show and tell your partner what you like. If you don t, your partner might not know what you want.    Ask your partner to show you how he or she wants to be touched.    Be patient. Take your time. Relax. Give yourselves a chance to become aroused.    Try being intimate without  intercourse. Instead, exchange back rubs. Or try kissing, or just a soft touch.    Focus on what you and your partner like about each other. This could be a certain laugh or smile, or other joys you share together.  Tips for talking  It s OK to be shy when you talk about sex with your partner. But talking gets easier with practice. Use these tips when you talk with each other.    Choose a time and place when you re both relaxed and comfortable.    Listen to your partner. Try repeating back what you think the other has said. This will help show if you ve understood each other.    Don t  what your partner says. Talking feels safer if you don t criticize each other.    Don t be defensive. You may not like something your partner says. But you can still thank your partner for being honest.    Think about meeting with a counselor. They re trained to help couples who are being treated for ED.  Date Last Reviewed: 1/1/2017 2000-2017 [employer] [address] [place] [address], [place] [address]. All rights reserved. This information is not intended as a substitute for professional medical care. Always follow your healthcare professional's instructions.        Oral Medicines for Erectile Dysfunction  You can use prescription oral medicine for ED. They often work well. But, like all medicines, they can have side effects. Also, you can t use them if you have certain health problems or take certain other medicines. Talk with your doctor about oral ED medicine. Ask whether it is right for you.  Types of oral ED medicines  There are three types of prescription oral ED medicines. Each one increases blood flow to the penis. When the penis is stimulated, an erection results. The types are:    Sildenafil citrate     Tadalafil     Vardenafil    Avanfil  What oral ED medicines don t do  There are some things ED medicines can t do.    They don t cure the cause of your ED. Without the medicine, you ll still have trouble  getting an erection.    They can t produce an erection without sexual stimulation.    They won t increase sexual desire. They also won t solve any other sexual issues. Psychological, emotional, or relationship issues will not be fixed.  Taking oral ED medicines safely    Do not combine ED medicines with other treatments unless your doctor tells you to.    Don t take ED medicines more often or in larger doses than prescribed.    Tell your doctor your health history. Mention all medicines you take. This includes over-the-counter drugs, supplements, and herbs.    Ask your doctor about whether you can drink alcohol while taking ED medication.  Possible side effects of oral ED medicines    Headache    Facial flushing    Runny or stuffy nose    Indigestion    Distortion of your color vision for a short time    Sudden vision loss or hearing loss (rare)    Dizziness  Risks of oral ED medicines    Do not take ED medicines if you take medicines containing nitrates. The combination may drop your blood pressure to a dangerous level. Nitrates include nitroglycerin (a drug for angina or chest pain). They are also in  poppers,  an inhaled recreational drug. If you re not sure whether you re taking nitrates, ask your doctor or pharmacist.    Medicines called alpha-blockers can interact with ED medicines. They can cause a sudden drop in blood pressure. Alpha-blockers are a common treatment for prostate problems. They also treat high blood pressure. Be sure your doctor knows if you take these medicines.    If you ve had a heart attack or have heart disease and you have not had sex for a while, talk to your doctor. Having sex again can put extra strain on your heart. Your doctor can confirm that your heart is healthy enough for sex.    It is rare, but some men taking ED medicines have had sudden vision loss. This may be more likely if other health problems are present. These include high blood pressure and diabetes. Ask your doctor if  you are at risk for this type of vision loss.    In rare cases, an erection may last too long. This can badly damage the blood vessels in your penis. If an erection lasts longer than 4 hours, go to the emergency room right away.   Date Last Reviewed: 1/1/2017 2000-2017 [employer]. [address] [place] [address]. All rights reserved. This information is not intended as a substitute for professional medical care. Always follow your healthcare professional's instructions.        Erectile Dysfunction: Erectile Aids and Other Treatments  If you have erectile dysfunction (ED), treatment can help. Certain treatments work directly on your penis. Talk to your doctor about the pros and cons of each. And be sure to learn the correct technique.  Vacuum pump    You slip a tube over your penis. A simple pump then creates a vacuum that pulls blood into your penis. This causes an erection. You then put a tension ring around the base of your penis to hold in the blood. You then remove the tube. The tension ring must not stay on for more than 30 minutes.    Risks and complications may include pain in your penis or scrotum. The penis may also feel cool or change color during your erection.    A vacuum pump draws blood into the penis, causing an erection.      Transurethral medicine    You insert the end of a small applicator into your urethra to place a tiny, soft medicine  pellet. The medicine is absorbed into your penis. The drugs relax blood vessels so they can fill with blood. Within about 10 minutes, your penis can become rigid enough for sexual intercourse. To keep your erection, you may need to use a tension ring.    Risks and complications may include pain and irritation of your urethra. Get medical help right away if you have an erection that lasts for longer than 4 hours.    A medicated pellet put into the urethra causes an erection.      Self-injections    You inject a special drug into your  penis. The drug relaxes blood vessels so they can fill with blood. Within about 10 minutes, your penis can become rigid enough for sexual intercourse. A tension ring is not needed to maintain your erection. Your doctor can explain the injection process to you.    Risks and complications may include pain, bleeding, bruising, or scarring. Get medical help right away if you have an erection that lasts for longer than 4 hours.    Medication injected into the side of the penis causes an erection.      Tension ring    A tension ring is usually used along with another type of treatment. Once enough blood has flowed into your penis to cause an erection, the tension ring will keep the blood from flowing out again. This maintains the erection. The ring must not stay on for more than 30 minutes. A tension ring is also called a constriction ring or venous flow controller.    Risks and complications may include pinched, bruised, or irritated skin. If you have an allergic reaction to latex rings, try a silicone ring.    A tension ring keeps blood from flowing out of the erect penis.      Date Last Reviewed: 1/1/2017 2000-2017 [employer]. [address] [place] [address]. All rights reserved. This information is not intended as a substitute for professional medical care. Always follow your healthcare professional's instructions.        Surgery for Erectile Dysfunction (Implants)  Surgery for erectile dysfunction is not common, but it may be the best treatment in some cases. During surgery, your doctor places an implant (also called a prosthesis) inside the spongy chambers of your penis. Then, the implant can be used to provide an erection.      Mechanical implants  This type of implant is easy to use. Bendable rods can make your penis appear erect. When not in use, the rods can be bent downward. Some implants have joints that lock into position.  Inflatable implants  This is the most complex type of  implant. It allows your penis to look and feel either erect or flaccid. You pump fluid from a storage bulb to make your penis erect. A release valve makes your penis flaccid again. Using the device properly takes some skill and practice.     Risks and complications    Infection    Bleeding    Failure or leakage of the prosthesis    Erosion of the prosthesis   Date Last Reviewed: 1/1/2017 2000-2017 [employer]. [address] [place] [address]. All rights reserved. This information is not intended as a substitute for professional medical care. Always follow your healthcare professional's instructions.

## 2017-11-14 NOTE — NURSING NOTE
"Chief Complaint   Patient presents with     Consult     Low testosterone; A Flaim referring       Initial /80 (BP Location: Left arm, Cuff Size: Adult Large)  Pulse 65  Temp 97.8  F (36.6  C) (Tympanic)  Ht 1.88 m (6' 2\")  Wt 121.1 kg (267 lb)  SpO2 95%  BMI 34.28 kg/m2 Estimated body mass index is 34.28 kg/(m^2) as calculated from the following:    Height as of this encounter: 1.88 m (6' 2\").    Weight as of this encounter: 121.1 kg (267 lb).  Medication Reconciliation: complete   Carmen Cortez LPN        "

## 2017-11-14 NOTE — MR AVS SNAPSHOT
After Visit Summary   11/14/2017    Kannan Ordonez    MRN: [id number]           Patient Information     Date Of Birth          1990        Visit Information        Provider Department      11/14/2017 3:30 PM Vernell Mesa NP [facility]        Today's Diagnoses     Snoring    -  1    Erectile dysfunction, unspecified erectile dysfunction type        Sleep apnea, unspecified type        Low libido          Care Instructions    Please stop by the lab on a day it is convenient for you, before 10:00 am and get your blood drawn.  I referred you to the sleep lab, they should call you to schedule an appointment.    The following information is important for you to know regarding testosterone supplementation:  1) Testosterone may be supplied in the IM (shot) form, by patches, and by cream or gel.  2) The shot will be given every two weeks, relieving you of the responsibility of daily application.  The disadvantages include pain at injection site and possible fluctuations in mood, libido and energy.  3) Patches may be used, the primary disadvantage may be a rash that can develop from the patch.  4) Gels or creams must be placed daily.  They deliver a steady stream of testosterone.  It is important for you to know that you should follow package directions regarding how long you must wait before swimming or showering.  You should also not have skin to skin contact with children or females without washing the area first with soap and water.  You might otherwise transfer some testosterone accidentally to them.    5) Some issues to be aware of with testosterone supplementation:  You may experience acne or gynecomastia (tender or enlarged breasts), you may need to be screened for prostate cancer because it is possible that testosterone therapy could make prostate cancer worse , you could possibly experience a urinary outflow problem from your prostate, sleep apnea may become worse (Ok if  treated with a Cpap already), your hematocrit may become abnormally high and fertility may be affected.      Testosterone Supplementation Expectations:    I follow the guideline from the Endocrinology Society, for your safety.    1)  After our initial visit, with a digital rectal exam, we may start your supplementation if the lab levels indicate a need for it.  Initial labs most often include a testosterone level, a free testosterone level, a PSA and a hematocrit.  2)  You must see me again in 3 months to check how you are doing.  This is a clinic appt.You should have your labs drawn about a week prior to your appointment.  This will include a testosterone level and a hematocrit.  Your labs must be done prior to 10:00 am and if you are receiving injections, it must be mid way between injections.  3) Six months after you start therapy you should have labs again, labs only, no clinic appointment.  This will usually be a testosterone level and a hematocrit. Follow the same rules for labs as above.  4) One year after you have started therapy you must see me in the clinic again.  You should have labs done about a week before.  These will include usually a testosterone level, a PSA and a hematocrit.    5) If you become well established with the therapy we can reduce your appointments now to yearly.  You may need labs at 6 months.    However... Please note that if you need dose adjustments, this schedule may change and I may need labs sooner.  (or if you have problems)  I am most concerned about your safety.  If you do not have labs and appointments as scheduled, I will not order more testosterone.           Evaluating Erectile Dysfunction     Doctor talking to man.     Many men feel embarrassed to talk to a doctor about erectile dysfunction (ED). This common problem can be treated, but only if your doctor knows about it. Your doctor will likely ask you questions about your ED. Whether you re asked or not, tell your doctor  anything that might help your doctor understand the problem. Your doctor may do an exam and may run some tests to help find the cause of your ED.  A simple exam  A medical exam may help your doctor understand what is causing your problem. ED is sometimes the first sign of some other health problem, so your doctor may check your overall health. He or she may also examine your penis, scrotum, and testicles. Tell your doctor about all of the medicines you take, including prescribed and over-the-counter medicines, as well as any herbs or supplements.  You may have some tests  Your doctor may recommend some or all of these tests:    Blood tests measure your levels of hormones or lipids (fatty substances in the blood, including cholesterol). Other tests check for diabetes or help show the health of your liver, kidneys, and prostate.    Blood flow tests check how well blood moves through your penis.    A rectal exam checks for an enlarged prostate gland. An enlarged prostate and ED have been linked in recent studies.    Additional tests check for other conditions that limit your ability to have intercourse.  Your treatment plan  Based on what you say and what any exam shows, your doctor will recommend a treatment plan. The first step may be to try ED medicines, since they help most men. If they don t help you, your doctor can suggest other kinds of treatment. You and your partner may also want to discuss which options would work best in your relationship. Treatment may include addressing the cause of health problems, such as lowering your cholesterol. And counseling may be recommended to talk about underlying emotional issues.  Date Last Reviewed: 1/1/2017 2000-2017 The [employer]. [address] [place] [address], [place] [address]. All rights reserved. This information is not intended as a substitute for professional medical care. Always follow your healthcare professional's instructions.        Understanding  Erectile Dysfunction    Erectile dysfunction (ED) is a problem getting an erection firm enough or keeping it long enough for intercourse. The problem can happen to any man at any age. But health problems that can lead to ED become more common as a man ages. Up to half of men over age 40 experience ED at some point.  Causes of ED  ED can have many causes. Most are physical. Some are emotional issues. Often, a combination of causes is involved. Causes of ED may include:    Medical conditions such as diabetes or depression    Smoking tobacco or marijuana    Drinking too much alcohol    Side effects of medications    Injury to nerves or blood vessels    Emotional issues such as stress or relationship problems  ED can be treated  Prescription medications for ED are available. They help many men who try them. Depending upon the cause of the ED, though, medications may not be enough. In these cases, other treatment options are available. These include erectile aids and surgery. Your health care provider can tell you more about the treatment that is right for you. And new treatments for ED are being studied. No matter what the treatment you decide on, stay in touch with your doctor. If your symptoms persist, he or she may be able to adjust your current treatment or try something new.  Date Last Reviewed: 1/1/2017 2000-2017 [employer]. [address] [place] [address]. All rights reserved. This information is not intended as a substitute for professional medical care. Always follow your healthcare professional's instructions.        Erectile Dysfunction: Rebuilding Intimacy     Man and woman sitting together on couch, smiling.     Being intimate means being close as a couple, with sex as just one part of intimacy. A hug, a kind remark, or a gift can be very romantic, even if sex doesn t follow. So renew your intimacy along with your sex life. Learn to talk with, and listen to, your partner. And  remember that your value as a man goes beyond what you do in bed.  Tips for intimacy  As you and your partner become closer to each other, you might find that you can enjoy sex more.    Show and tell your partner what you like. If you don t, your partner might not know what you want.    Ask your partner to show you how he or she wants to be touched.    Be patient. Take your time. Relax. Give yourselves a chance to become aroused.    Try being intimate without intercourse. Instead, exchange back rubs. Or try kissing, or just a soft touch.    Focus on what you and your partner like about each other. This could be a certain laugh or smile, or other joys you share together.  Tips for talking  It s OK to be shy when you talk about sex with your partner. But talking gets easier with practice. Use these tips when you talk with each other.    Choose a time and place when you re both relaxed and comfortable.    Listen to your partner. Try repeating back what you think the other has said. This will help show if you ve understood each other.    Don t  what your partner says. Talking feels safer if you don t criticize each other.    Don t be defensive. You may not like something your partner says. But you can still thank your partner for being honest.    Think about meeting with a counselor. They re trained to help couples who are being treated for ED.  Date Last Reviewed: 1/1/2017 2000-2017 [employer]. [address] [place] [address], [place] [address]. All rights reserved. This information is not intended as a substitute for professional medical care. Always follow your healthcare professional's instructions.        Oral Medicines for Erectile Dysfunction  You can use prescription oral medicine for ED. They often work well. But, like all medicines, they can have side effects. Also, you can t use them if you have certain health problems or take certain other medicines. Talk with your doctor about oral ED  medicine. Ask whether it is right for you.  Types of oral ED medicines  There are three types of prescription oral ED medicines. Each one increases blood flow to the penis. When the penis is stimulated, an erection results. The types are:    Sildenafil citrate     Tadalafil     Vardenafil    Avanfil  What oral ED medicines don t do  There are some things ED medicines can t do.    They don t cure the cause of your ED. Without the medicine, you ll still have trouble getting an erection.    They can t produce an erection without sexual stimulation.    They won t increase sexual desire. They also won t solve any other sexual issues. Psychological, emotional, or relationship issues will not be fixed.  Taking oral ED medicines safely    Do not combine ED medicines with other treatments unless your doctor tells you to.    Don t take ED medicines more often or in larger doses than prescribed.    Tell your doctor your health history. Mention all medicines you take. This includes over-the-counter drugs, supplements, and herbs.    Ask your doctor about whether you can drink alcohol while taking ED medication.  Possible side effects of oral ED medicines    Headache    Facial flushing    Runny or stuffy nose    Indigestion    Distortion of your color vision for a short time    Sudden vision loss or hearing loss (rare)    Dizziness  Risks of oral ED medicines    Do not take ED medicines if you take medicines containing nitrates. The combination may drop your blood pressure to a dangerous level. Nitrates include nitroglycerin (a drug for angina or chest pain). They are also in  poppers,  an inhaled recreational drug. If you re not sure whether you re taking nitrates, ask your doctor or pharmacist.    Medicines called alpha-blockers can interact with ED medicines. They can cause a sudden drop in blood pressure. Alpha-blockers are a common treatment for prostate problems. They also treat high blood pressure. Be sure your doctor  knows if you take these medicines.    If you ve had a heart attack or have heart disease and you have not had sex for a while, talk to your doctor. Having sex again can put extra strain on your heart. Your doctor can confirm that your heart is healthy enough for sex.    It is rare, but some men taking ED medicines have had sudden vision loss. This may be more likely if other health problems are present. These include high blood pressure and diabetes. Ask your doctor if you are at risk for this type of vision loss.    In rare cases, an erection may last too long. This can badly damage the blood vessels in your penis. If an erection lasts longer than 4 hours, go to the emergency room right away.   Date Last Reviewed: 1/1/2017 2000-2017 [employer]. [address] [place] [address], [place] [id number]. All rights reserved. This information is not intended as a substitute for professional medical care. Always follow your healthcare professional's instructions.        Erectile Dysfunction: Erectile Aids and Other Treatments  If you have erectile dysfunction (ED), treatment can help. Certain treatments work directly on your penis. Talk to your doctor about the pros and cons of each. And be sure to learn the correct technique.  Vacuum pump    You slip a tube over your penis. A simple pump then creates a vacuum that pulls blood into your penis. This causes an erection. You then put a tension ring around the base of your penis to hold in the blood. You then remove the tube. The tension ring must not stay on for more than 30 minutes.    Risks and complications may include pain in your penis or scrotum. The penis may also feel cool or change color during your erection.    A vacuum pump draws blood into the penis, causing an erection.      Transurethral medicine    You insert the end of a small applicator into your urethra to place a tiny, soft medicine  pellet. The medicine is absorbed into your penis. The drugs relax  blood vessels so they can fill with blood. Within about 10 minutes, your penis can become rigid enough for sexual intercourse. To keep your erection, you may need to use a tension ring.    Risks and complications may include pain and irritation of your urethra. Get medical help right away if you have an erection that lasts for longer than 4 hours.    A medicated pellet put into the urethra causes an erection.      Self-injections    You inject a special drug into your penis. The drug relaxes blood vessels so they can fill with blood. Within about 10 minutes, your penis can become rigid enough for sexual intercourse. A tension ring is not needed to maintain your erection. Your doctor can explain the injection process to you.    Risks and complications may include pain, bleeding, bruising, or scarring. Get medical help right away if you have an erection that lasts for longer than 4 hours.    Medication injected into the side of the penis causes an erection.      Tension ring    A tension ring is usually used along with another type of treatment. Once enough blood has flowed into your penis to cause an erection, the tension ring will keep the blood from flowing out again. This maintains the erection. The ring must not stay on for more than 30 minutes. A tension ring is also called a constriction ring or venous flow controller.    Risks and complications may include pinched, bruised, or irritated skin. If you have an allergic reaction to latex rings, try a silicone ring.    A tension ring keeps blood from flowing out of the erect penis.      Date Last Reviewed: 1/1/2017 2000-2017 [employer]. [address] [place] [address], [place] [address]. All rights reserved. This information is not intended as a substitute for professional medical care. Always follow your healthcare professional's instructions.        Surgery for Erectile Dysfunction (Implants)  Surgery for erectile dysfunction is not common, but it may be  the best treatment in some cases. During surgery, your doctor places an implant (also called a prosthesis) inside the spongy chambers of your penis. Then, the implant can be used to provide an erection.      Mechanical implants  This type of implant is easy to use. Bendable rods can make your penis appear erect. When not in use, the rods can be bent downward. Some implants have joints that lock into position.  Inflatable implants  This is the most complex type of implant. It allows your penis to look and feel either erect or flaccid. You pump fluid from a storage bulb to make your penis erect. A release valve makes your penis flaccid again. Using the device properly takes some skill and practice.     Risks and complications    Infection    Bleeding    Failure or leakage of the prosthesis    Erosion of the prosthesis   Date Last Reviewed: 1/1/2017 2000-2017 [employer]. [address] [place] [address]. All rights reserved. This information is not intended as a substitute for professional medical care. Always follow your healthcare professional's instructions.                Follow-ups after your visit        Additional Services     SLEEP EVALUATION & MANAGEMENT REFERRAL - [facility] [phone] (Age 5 and up)       Please be aware that coverage of these services is subject to the terms and limitations of your health insurance plan.  Call member services at your health plan with any benefit or coverage questions.      Please bring the following to your appointment:    >>   List of current medications   >>   This referral request   >>   Any documents/labs given to you for this referral                      Future tests that were ordered for you today     Open Future Orders        Priority Expected Expires Ordered    Testosterone total Routine  11/14/2018 11/14/2017    SLEEP EVALUATION & MANAGEMENT REFERRAL - [facility] [phone]  "(Age 5 and up) Routine  2018            Who to contact     If you have questions or need follow up information about today's clinic visit or your schedule please contact [facility] directly at [phone].  Normal or non-critical lab and imaging results will be communicated to you by MyChart, letter or phone within 4 business days after the clinic has received the results. If you do not hear from us within 7 days, please contact the clinic through MyChart or phone. If you have a critical or abnormal lab result, we will notify you by phone as soon as possible.  Submit refill requests through [employer] or call your pharmacy and they will forward the refill request to us. Please allow 3 business days for your refill to be completed.          Additional Information About Your Visit        [employer] Information     [employer] lets you send messages to your doctor, view your test results, renew your prescriptions, schedule appointments and more. To sign up, go to [email or web address] . Click on \"Log in\" on the left side of the screen, which will take you to the Welcome page. Then click on \"Sign up Now\" on the right side of the page.     You will be asked to enter the access code listed below, as well as some personal information. Please follow the directions to create your username and password.     Your access code is: [email or web address]  Expires: 2018  1:32 PM     Your access code will  in 90 days. If you need help or a new code, please call your [place] clinic or [phone].        Care EveryWhere ID     This is your Care EveryWhere ID. This could be used by other organizations to access your [place] medical records  [id number]        Your Vitals Were     Pulse Temperature Height Pulse Oximetry BMI (Body Mass Index)       65 97.8  F (36.6  C) (Tympanic) 1.88 m (6' 2\") 95% 34.28 kg/m2        Blood Pressure from Last 3 Encounters:   17 142/80   10/24/17 122/70   17 " 128/72    Weight from Last 3 Encounters:   11/14/17 121.1 kg (267 lb)   10/24/17 121.1 kg (267 lb)   06/08/17 126.1 kg (278 lb)              We Performed the Following     Hematocrit     UROLOGY ADULT REFERRAL        Primary Care Provider Office Phone # Fax #    Yeimy Corona [phone]       [address] [address]        Equal Access to Services     ZONIA ARAGON : Hadii aad ku hadasho Soomaali, waaxda luqadaha, qaybta kaalmada adeegyada, waxay idiin hayaan adeeg chica hatch . So [facility] [phone].    ATENCIÓN: Si antwanla nadege, tiene a underwood disposición servicios gratuitos de asistencia lingüística. [facility] [phone].    We comply with applicable federal civil rights laws and [place] laws. We do not discriminate on the basis of race, color, national origin, age, disability, sex, sexual orientation, or gender identity.            Thank you!     Thank you for choosing [facility]  for your care. Our goal is always to provide you with excellent care. Hearing back from our patients is one way we can continue to improve our services. Please take a few minutes to complete the written survey that you may receive in the mail after your visit with us. Thank you!             Your Updated Medication List - Protect others around you: Learn how to safely use, store and throw away your medicines at www.disposemymeds.org.          This list is accurate as of: 11/14/17  4:18 PM.  Always use your most recent med list.                   Brand Name Dispense Instructions for use Diagnosis    albuterol 108 (90 BASE) MCG/ACT Inhaler    PROAIR HFA/PROVENTIL HFA/VENTOLIN HFA    1 Inhaler    Inhale 2 puffs into the lungs every 6 hours as needed for shortness of breath / dyspnea    Mild intermittent asthma without complication       budesonide-formoterol 160-4.5 MCG/ACT Inhaler    SYMBICORT    3 Inhaler    Inhale 2 puffs into the lungs 2 times daily    Mild persistent asthma  without complication       escitalopram 10 MG tablet    LEXAPRO    30 tablet    Take 1 tablet (10 mg) by mouth daily    Moderate episode of recurrent major depressive disorder (H)       ibuprofen 800 MG tablet    ADVIL/MOTRIN     Take 800 mg by mouth

## 2017-11-14 NOTE — PROGRESS NOTES
UROLOGY ERECTILE DYSFUNCTION CONSULT NOTE    HPI: 27 year old male presents with erectile dysfunction.     ERECTION HISTORY:   Noctural erections: yes  Penile trauma: no  Penile curvature: no  Erections with masturbation: about 50% time  Time course of ED: about a year  Scale of rigidity (1-10):  6  SUCCESS OF PREVIOUS TREATMENTS FOR ED: N/A  Oral PDE5 inhibitors: N/A  MUSE: N/A  vacuum device: N/A  venous constriction band: N/A  injection therapy: N/A    CARDIAC RISK FACTORS: none  MENTAL HEALTH REVIEW: anxiety  PARTNER SEXUAL FUNCTION: no  HTN:  No  Hyperlipidemia: no  Smoking: Questioned patient about current smoking habits.  Pt. Does not smoke, does chew  DM: no  Meds associated with ED that he's taking: none  Anxiety/anger/depression: yes    Patient Active Problem List   Diagnosis     Benign essential hypertension     Mild persistent asthma     Hypothyroidism due to acquired atrophy of thyroid     Hyperlipidemia LDL goal <100     ACP (advance care planning)     Anxiety     Recurrent major depressive disorder (H)     Vitamin D deficiency       PSH:   Past Surgical History:   Procedure Laterality Date     ORTHOPEDIC SURGERY  2015    r shoulder teat bone spur     ORTHOPEDIC SURGERY  2-2016    AC joint right     ORTHOPEDIC SURGERY  2016    Rt labrial tear     ORTHOPEDIC SURGERY  06/2017    revision decompression subpectoral biceps tenodesis        Allergies:   Allergies   Allergen Reactions     Morphine Sulfate Rash     Cats      Other reaction(s): Eye Irritation, Sneezing     Dogs      Other reaction(s): Eye Irritation, Sneezing       Current Outpatient Prescriptions   Medication     ibuprofen (ADVIL/MOTRIN) 800 MG tablet     budesonide-formoterol (SYMBICORT) 160-4.5 MCG/ACT Inhaler     escitalopram (LEXAPRO) 10 MG tablet     albuterol (PROAIR HFA, PROVENTIL HFA, VENTOLIN HFA) 108 (90 BASE) MCG/ACT inhaler     No current facility-administered medications for this visit.        FH: Denies history of kidney, ureter,  "bladder, prostate, urethral, or penile cancer   Family History   Problem Relation Age of Onset     DIABETES Mother      Hypertension Mother      Hyperlipidemia Mother      Coronary Artery Disease Father      Hyperlipidemia Father      Hypertension Father      Thyroid Disease No family hx of         Social:   Social History     Social History     Marital status: Single     Spouse name: N/A     Number of children: N/A     Years of education: N/A     Occupational History     Not on file.     Social History Main Topics     Smoking status: Never Smoker     Smokeless tobacco: Current User     Types: Chew     Alcohol use Yes      Comment: occ     Drug use: No     Sexual activity: Yes     Partners: Female     Other Topics Concern     Parent/Sibling W/ Cabg, Mi Or Angioplasty Before 65f 55m? Yes     father-stents at 36     Social History Narrative       Review Of Systems  Skin: rash  Eyes: negative  Ears/Nose/Throat: negative  Respiratory: Shortness of breath- only with asthma flare up  Cardiovascular: negative  Gastrointestinal: negative  Genitourinary: as above  Musculoskeletal: negative  Neurologic: restless legs  Psychiatric: depression stable  Hematologic/Lymphatic/Immunologic: negative  Endocrine: negative    PE:   /80 (BP Location: Left arm, Cuff Size: Adult Large)  Pulse 65  Temp 97.8  F (36.6  C) (Tympanic)  Ht 1.88 m (6' 2\")  Wt 121.1 kg (267 lb)  SpO2 95%  BMI 34.28 kg/m2    GEN: pleasant male in NAD, A&Ox3, normal body habitus   Lungs: no respiratory distress  Abd: soft, NTND, no masses, no guarding, no suprapubic tenderness  Back: No CVAT  Ext: no edema, no circulatory issues  MONICA: deferred per patient request, I have informed him that if he does want testosterone a MONICA will be performed  Neuro: no gross deficits, non-focal  MS: moving all extremities  Skin: warm and moist   Psych: normal mood and affect     A:  Kannan RADFORD José Luis is a 27 year old male with erectile dysfunction presents for " consultation    P:  1) Krzysztof may be interested in testosterone, depending on his lab results when they are drawn before 10:00 am. (his current level is too high to consider testosterone)  He is given both verbal and written information regarding the risks and benefits of testosterone, and the different delivery options.  He verbalizes understanding.  He will have a hct level and new testosterone level done, before 10:00 am.  I have informed him that if he is low, he will need some additional labs, sid given his age: baseline prostate specific antigen (needed if testosterone is started), fsh, LH and prolactin levels.   2) Sleep study ordered, this must be completed prior to testosterone administration.  He tells me that his partner has noted that he stops breathing.  If diagnosed, sleep apnea must be treated prior to testosterone administration.  3) We discussed how his social situation is very likely a strong contributing factor to his erectile dysfunction and poor libido issues.  He tells me that he works about 800-1000 hours of overtime yearly.  He does not have much time to connect with is partner.  They have a new baby and a new house, which are additional stressors, and what sleep he gets is likely very poor due to his likely sleep apnea issues.    I have told him that I will prescribe testosterone if his levels are not within normal, but that the other concerns noted above are much more likely to be the cause of his issues.    Total time spent with the patient was approximately 45 minutes, with over half the time spent in patient counseling.    Vernell Mesa CNP, [facility]  Urology and Wound Care  November 15, 2017

## 2017-11-17 ENCOUNTER — TRANSFERRED RECORDS (OUTPATIENT)
Dept: HEALTH INFORMATION MANAGEMENT | Facility: HOSPITAL | Age: 27
End: 2017-11-17

## 2017-11-17 LAB
CREAT SERPL-MCNC: 0.98 MG/DL (ref 0.7–1.2)
GLUCOSE SERPL-MCNC: 83 MG/DL (ref 70–100)
POTASSIUM SERPL-SCNC: 3.7 MEQ/L (ref 3.4–5.1)

## 2017-11-20 ENCOUNTER — OFFICE VISIT (OUTPATIENT)
Dept: FAMILY MEDICINE | Facility: OTHER | Age: 27
End: 2017-11-20
Attending: NURSE PRACTITIONER
Payer: COMMERCIAL

## 2017-11-20 VITALS
BODY MASS INDEX: 33.88 KG/M2 | SYSTOLIC BLOOD PRESSURE: 134 MMHG | DIASTOLIC BLOOD PRESSURE: 74 MMHG | HEIGHT: 74 IN | WEIGHT: 264 LBS | TEMPERATURE: 97.9 F | RESPIRATION RATE: 16 BRPM | HEART RATE: 88 BPM

## 2017-11-20 DIAGNOSIS — N50.811 RIGHT TESTICULAR PAIN: Primary | ICD-10-CM

## 2017-11-20 DIAGNOSIS — R10.31 RIGHT INGUINAL PAIN: ICD-10-CM

## 2017-11-20 PROBLEM — R01.1 HEART MURMUR: Status: ACTIVE | Noted: 2017-11-20

## 2017-11-20 PROCEDURE — 99214 OFFICE O/P EST MOD 30 MIN: CPT | Performed by: NURSE PRACTITIONER

## 2017-11-20 NOTE — PROGRESS NOTES
SUBJECTIVE:   Kannan Ordonez is a 27 year old male who presents to clinic today for the following health issues:      ER Follow up  [facility] - Friday 11/17/17    Presented there with right groin and testicular pain after he was working and felt a pop - right groin.  He continues with right pelvic, groin, and testicular discomfort.  Positional changes are painful.     He works for [employer] - as a  locally.  His work involves lifting, and other manual labor      He has been compliant with rest, avoiding lifting, taking an anti-inflammatory, and ice.       Supervisor was notified of this injury and is aware of today's appointment        ED progress note is attached:  Progress Notes  - in this encounter    Janette Long APRN, CNP - 11/17/2017 7:29 PM CST  Formatting of this note may be different from the original.    Patient:   Knanan Ordonez    Means of Arrival:     Chief Complaint:  Groin Pain    History of Present Illness:  Kannan Ordonez is a 27 year old male who presents to the ED for pain in the right groin. The patient states he was working today when he was bent over placing some metal, he stood up empty handed and felt a pop and severe pain in the right groin. No similar episodes such as this in the past. No analgesia tried at home.     The history is provided by the patient.     Review of Systems:   Review of Systems   Constitutional: Negative for fever.   Respiratory: Negative for cough.   Gastrointestinal: Negative for abdominal pain, diarrhea, nausea, rectal pain and vomiting.   Musculoskeletal:   Groin pain    Neurological: Negative for syncope.       Family History:   No family history on file.    Social History:  He reports that he has never smoked. His smokeless tobacco use includes Snuff. He reports that he drinks alcohol. He reports that he does not use drugs.    Exam:    Initial Vitals Most Recent Vitals   Temp: 97.9  F (36.6  C) (11/17/17 1713) Temp:  97.9  F (36.6  C) (11/17/17 1713)   Pulse: 73 (11/17/17 1713) Pulse: 70 (11/17/17 1928)   Resp: 18 (11/17/17 1713) Resp: 18 (11/17/17 1928)   BP: (!) 171/84 (11/17/17 1713) BP: 141/78 (11/17/17 1928)   SpO2: 97 % (11/17/17 1713) SpO2: 95 % (11/17/17 1928)           Physical Exam:  Physical Exam   Constitutional: He appears well-developed and well-nourished. He appears distressed (quite painful with movement).   Cardiovascular: S1 normal, S2 normal and intact distal pulses.   Pulses:  Femoral pulses are 2+ on the right side, and 2+ on the left side.  Pulmonary/Chest: Breath sounds normal.   Abdominal: Soft. Normal appearance. He exhibits no distension. There is no CVA tenderness. Hernia confirmed negative in the right inguinal area (pain with palpation) and confirmed negative in the left inguinal area.   Genitourinary: Penis normal.     Right testis shows no mass and no tenderness. Cremasteric reflex is not absent on the right side. Left testis shows no mass and no tenderness. Cremasteric reflex is not absent on the left side.     Lab Results:  Results for orders placed or performed during the hospital encounter of 11/17/17   BASIC METABOLIC PANEL   Collection Time: 11/17/17 6:35 PM   Result Value Ref Range   Sodium 137 134 - 143 mEq/L   Potassium 3.7 3.4 - 5.1 mEq/L   Chloride 105 99 - 110 mEq/L   Carbon Dioxide 23 19 - 29 mEq/L   Anion Gap 9.0 3.0 - 15.0 mEq/L   Blood Urea Nitrogen 20 5 - 24 mg/dL   Creatinine 0.98 0.70 - 1.20 mg/dL   Glomerular Filtration Rate >60 >60 mL/min/1.73 m*2   Calcium 9.5 8.4 - 10.5 mg/dL   Glucose 83 70 - 100 mg/dL   HEMOGRAM/DIFFERENTIAL   Collection Time: 11/17/17 6:35 PM   Result Value Ref Range   WBC 10.2 3.4 - 10.7 10*9/L   RBC 5.17 4.20 - 5.90 10*12/L   HGB 16.0 13.0 - 17.0 g/dL   HCT 47.5 37.5 - 51.0 %   MCV 92.0 82.0 - 99.0 fL   MCH 30.9 27.0 - 34.0 pg   MCHC 33.6 32.0 - 35.7 g/dL   RDW 11.1 11.0 - 15.0 %    150 - 400 10*9/L   Neutrophils % 66.7 %   Lymphocytes % 24.9 %    Monocytes % 5.9 %   Eosinophils % 2.2 %   Basophils % 0.3 %   Neutrophils Absolute 6.9 1.7 - 8.5 10*9/L   Lymphocytes Absolute 2.5 0.9 - 3.6 10*9/L   Monocytes Absolute 0.6 0.3 - 1.0 10*9/L   Eosinophils Absolute 0.2 0.0 - 0.6 10*9/L   Basophils Absolute 0.0 0.0 - 0.3 10*9/L   URINALYSIS, REFLEX TO CULTURE   Collection Time: 11/17/17 5:57 PM   Result Value Ref Range   Urine Color Yellow Yellow   Urine Appearance Clear Clear   Urine Specific Gravity 1.010 1.003 - 1.035   Urine pH 6.0 5.0 - 8.0   Urine Glucose Negative Negative   Urine Ketones Trace (A) Negative   Urine Protein Negative Negative   Urine Nitrites Negative Negative   Urine Leukocyte Esterase Negative Negative   URINE DRUG SCREEN   Collection Time: 11/17/17 5:27 PM   Result Value Ref Range   Amphetamine Screen, Urine Qualitative Negative Positive cut-off concentration: 1000 ng/mL   Barbiturate Screen, Urine Qualitative Negative Positive cut-off concentration: 200 ng/mL   Benzodiazepine Screen, Urine Qualitative Negative Positive cut-off concentration: 200 ng/mL   Cocaine Screen, Urine Qualitative Negative Positive cut-off concentration: 300 ng/mL   Methadone Screen, Urine Qualitative Negative Positive cut-off concentration: 300 ng/mL   Oxycodone Screen, Urine Qualitative Negative Positive cut-off concentration: 300 ng/mL   Opiates Screen, Urine Qualitative Negative Positive cut-off concentration:300 ng/mL   PCP Screen, Urine Qualitative Negative Positive cut-off concentration: 25 ng/mL   THC Screen, Urine Qualitative Negative Positive cut-off concentration: 50 ng/mL       CT ABDOMEN PELVIS WO CONTRAST    HISTORY: Right-sided pelvic pain, sudden onset upon standing up.    FINDINGS: Lung bases are clear. Unenhanced liver, spleen, pancreas, gallbladder, and kidneys negative. No bowel obstruction. Appendix has been removed surgically. No ascites or pelvic fluid. Bone windows show no osseous lesion.    IMPRESSION: No acute process of the abdomen or  "pelvis.    Dictated By: Lai Berman MD 11/17/2017 6:57 PM  Edited By: ILIR 11/17/2017 7:03 PM    Electronically Signed: Lai Berman MD 11/17/2017 7:09 PM        Emergency Department Course:  The patient was vitally stable, painful with movement. Toradol given for discomfort in the right groin which provided moderate relief. Labs, urine and non contrast CT obtained and unremarkable for acute abnormality. Comfortable with discharge home. Note provided for work.    Procedures:  Procedures    Assessment:   Groin strain, right, initial encounter (primary encounter diagnosis)    Plan:   1. Discharge home  2. Tylenol and Ibuprofen for pain  3. Heat and ice for comfort  4. Activity as tolerated     Janette Meredith, SANA, CNP  11/17/17 2047    Fercho Gale MD  11/18/17 1851           Problem list and histories reviewed & adjusted, as indicated.  Additional history: as documented      Reviewed and updated as needed this visit by Provider         ROS:  Constitutional, HEENT, cardiovascular, pulmonary, gi and gu systems are negative, except as otherwise noted.      OBJECTIVE:   /74 (BP Location: Left arm, Patient Position: Sitting, Cuff Size: Adult Large)  Pulse 88  Temp 97.9  F (36.6  C) (Tympanic)  Resp 16  Ht 6' 2\" (1.88 m)  Wt 264 lb (119.7 kg)  BMI 33.9 kg/m2  Body mass index is 33.9 kg/(m^2).       GENERAL: alert and uncomfortable  RESP: lungs clear to auscultation - no rales, rhonchi or wheezes  ABDOMEN: bowel sounds normal, right pelvic tenderness  : Right testicular soreness, fullness, sharp pain with any movement - positional change, and upon palpation.  Inguinal canal tenderness noted, especially with bearing down  MS: no gross musculoskeletal defects noted, no edema  SKIN: no suspicious lesions or rashes      My concern is that given ongoing pain, lack of improvement, and increased pain with any movement, as well as testicular pain and inguinal pain, we need to rule out a " hernia.        ASSESSMENT/PLAN:       1. Right testicular pain  - GENERAL SURG ADULT REFERRAL  - Ibuprofen PRN  - acetaminophen-codeine (TYLENOL #3) 300-30 MG per tablet; 1-2 po HS PRN pain  Dispense: 40 tablet; Refill: 0  - No lifting  - Ice  - Rest    2. Right inguinal pain  - GENERAL SURG ADULT REFERRAL  - Ibuprofen PRN  - acetaminophen-codeine (TYLENOL #3) 300-30 MG per tablet; 1-2 po HS PRN pain  Dispense: 40 tablet; Refill: 0  - No lifting  - Ice  - Rest    Yeimy Corona NP  [facility]

## 2017-11-20 NOTE — LETTER
[facility]  [address]  [facility] [address]  Phone: [phone]    November 20, 2017        Kannan Ordonez  [facility] [address]          To whom it may concern:    RE: Kannan Brunner is unable to work until he is evaluated further for his work related injury.   I have ordered a general surgery consultation.    Please contact me for questions or concerns.      Sincerely,        CLEMENTE Zambrano

## 2017-11-20 NOTE — NURSING NOTE
"Chief Complaint   Patient presents with     Work Comp     Patient needs paperwork filled out       Initial /74 (BP Location: Left arm, Patient Position: Sitting, Cuff Size: Adult Large)  Pulse 88  Temp 97.9  F (36.6  C) (Tympanic)  Resp 16  Ht 6' 2\" (1.88 m)  Wt 264 lb (119.7 kg)  BMI 33.9 kg/m2 Estimated body mass index is 33.9 kg/(m^2) as calculated from the following:    Height as of this encounter: 6' 2\" (1.88 m).    Weight as of this encounter: 264 lb (119.7 kg).  Medication Reconciliation: complete   Annalise Freeman      "

## 2017-11-20 NOTE — PATIENT INSTRUCTIONS
ASSESSMENT/PLAN:       1. Right testicular pain  - GENERAL SURG ADULT REFERRAL  - Ibuprofen PRN  - acetaminophen-codeine (TYLENOL #3) 300-30 MG per tablet; 1-2 po HS PRN pain  Dispense: 40 tablet; Refill: 0  - No lifting  - Ice  - Rest    2. Right inguinal pain  - GENERAL SURG ADULT REFERRAL  - Ibuprofen PRN  - acetaminophen-codeine (TYLENOL #3) 300-30 MG per tablet; 1-2 po HS PRN pain  Dispense: 40 tablet; Refill: 0  - No lifting  - Ice  - Rest    Yeimy Corona NP  [facility]

## 2017-11-20 NOTE — MR AVS SNAPSHOT
After Visit Summary   11/20/2017    Kannan Ordonez    MRN: [id number]           Patient Information     Date Of Birth          1990        Visit Information        Provider Department      11/20/2017 2:00 PM Yeimy Corona NP [facility]        Today's Diagnoses     Right testicular pain    -  1    Right inguinal pain          Care Instructions        ASSESSMENT/PLAN:       1. Right testicular pain  - GENERAL SURG ADULT REFERRAL  - Ibuprofen PRN  - acetaminophen-codeine (TYLENOL #3) 300-30 MG per tablet; 1-2 po HS PRN pain  Dispense: 40 tablet; Refill: 0  - No lifting  - Ice  - Rest    2. Right inguinal pain  - GENERAL SURG ADULT REFERRAL  - Ibuprofen PRN  - acetaminophen-codeine (TYLENOL #3) 300-30 MG per tablet; 1-2 po HS PRN pain  Dispense: 40 tablet; Refill: 0  - No lifting  - Ice  - Rest    Yeimy Corona NP  [facility]            Follow-ups after your visit        Additional Services     GENERAL SURG ADULT REFERRAL       Your provider has referred you to: General surgery - first available - here or [facility].  Work comp.    Please be aware that coverage of these services is subject to the terms and limitations of your health insurance plan.  Call member services at your health plan with any benefit or coverage questions.      Please bring the following with you to your appointment:    (1) Any X-Rays, CTs or MRIs which have been performed.  Contact the facility where they were done to arrange for  prior to your scheduled appointment.   (2) List of current medications   (3) This referral request   (4) Any documents/labs given to you for this referral                  Your next 10 appointments already scheduled     Nov 29, 2017  9:00 AM CST   New Sleep Patient with Aftab Snyder MD   [facility] ([facility] )    [address] [address]   [phone]              Who to contact     If you have questions or need follow up  "information about today's clinic visit or your schedule please contact [facility] directly at [phone].  Normal or non-critical lab and imaging results will be communicated to you by MyChart, letter or phone within 4 business days after the clinic has received the results. If you do not hear from us within 7 days, please contact the clinic through [facility] or phone. If you have a critical or abnormal lab result, we will notify you by phone as soon as possible.  Submit refill requests through [address] or call your pharmacy and they will forward the refill request to us. Please allow 3 business days for your refill to be completed.          Additional Information About Your Visit        MyChart Information     [address] lets you send messages to your doctor, view your test results, renew your prescriptions, schedule appointments and more. To sign up, go to [email or web address] . Click on \"Log in\" on the left side of the screen, which will take you to the Welcome page. Then click on \"Sign up Now\" on the right side of the page.     You will be asked to enter the access code listed below, as well as some personal information. Please follow the directions to create your username and password.     Your access code is: [email or web address]  Expires: 2018  1:32 PM     Your access code will  in 90 days. If you need help or a new code, please call your [facility] clinic or [phone].        Care EveryWhere ID     This is your Care EveryWhere ID. This could be used by other organizations to access your [facility] medical records  [id number]        Your Vitals Were     Pulse Temperature Respirations Height BMI (Body Mass Index)       88 97.9  F (36.6  C) (Tympanic) 16 6' 2\" (1.88 m) 33.9 kg/m2        Blood Pressure from Last 3 Encounters:   17 134/74   17 142/80   10/24/17 122/70    Weight from Last 3 Encounters:   17 264 lb (119.7 kg)   17 267 lb (121.1 kg)   10/24/17 267 lb (121.1 " kg)              We Performed the Following     GENERAL SURG ADULT REFERRAL          Today's Medication Changes          These changes are accurate as of: 11/20/17  3:35 PM.  If you have any questions, ask your nurse or doctor.               Start taking these medicines.        Dose/Directions    acetaminophen-codeine 300-30 MG per tablet   Commonly known as:  TYLENOL #3   Used for:  Right testicular pain, Right inguinal pain   Started by:  Yeimy Corona NP        1-2 po HS PRN pain   Quantity:  40 tablet   Refills:  0            Where to get your medicines      Some of these will need a paper prescription and others can be bought over the counter.  Ask your nurse if you have questions.     Bring a paper prescription for each of these medications     acetaminophen-codeine 300-30 MG per tablet                Primary Care Provider Office Phone # Fax #    Yeimy Corona [phone]       [address] [address]        Equal Access to Services     [facility] : Hadii moose varghese hadasho Soomaali, waaxda luqadaha, qaybta kaalmada adeegyada, leigh mcelroy haywendy hatch . So [facility] [phone].    ATENCIÓN: Si habla español, tiene a underwood disposición servicios gratuitos de asistencia lingüística. Thu al [phone].    We comply with applicable federal civil rights laws and [place] laws. We do not discriminate on the basis of race, color, national origin, age, disability, sex, sexual orientation, or gender identity.            Thank you!     Thank you for choosing [facility]  for your care. Our goal is always to provide you with excellent care. Hearing back from our patients is one way we can continue to improve our services. Please take a few minutes to complete the written survey that you may receive in the mail after your visit with us. Thank you!             Your Updated Medication List - Protect others around you: Learn how to safely use, store and  throw away your medicines at www.disposemymeds.org.          This list is accurate as of: 11/20/17  3:35 PM.  Always use your most recent med list.                   Brand Name Dispense Instructions for use Diagnosis    acetaminophen-codeine 300-30 MG per tablet    TYLENOL #3    40 tablet    1-2 po HS PRN pain    Right testicular pain, Right inguinal pain       albuterol 108 (90 BASE) MCG/ACT Inhaler    PROAIR HFA/PROVENTIL HFA/VENTOLIN HFA    1 Inhaler    Inhale 2 puffs into the lungs every 6 hours as needed for shortness of breath / dyspnea    Mild intermittent asthma without complication       budesonide-formoterol 160-4.5 MCG/ACT Inhaler    SYMBICORT    3 Inhaler    Inhale 2 puffs into the lungs 2 times daily    Mild persistent asthma without complication       escitalopram 10 MG tablet    LEXAPRO    30 tablet    Take 1 tablet (10 mg) by mouth daily    Moderate episode of recurrent major depressive disorder (H)       ibuprofen 800 MG tablet    ADVIL/MOTRIN     Take 800 mg by mouth

## 2017-11-21 ENCOUNTER — TELEPHONE (OUTPATIENT)
Dept: FAMILY MEDICINE | Facility: OTHER | Age: 27
End: 2017-11-21

## 2017-11-21 DIAGNOSIS — N50.811 RIGHT TESTICULAR PAIN: ICD-10-CM

## 2017-11-21 DIAGNOSIS — R10.31 RIGHT INGUINAL PAIN: ICD-10-CM

## 2017-11-21 RX ORDER — HYDROCODONE BITARTRATE AND ACETAMINOPHEN 5; 325 MG/1; MG/1
TABLET ORAL
Qty: 40 TABLET | Refills: 0 | Status: SHIPPED | OUTPATIENT
Start: 2017-11-21 | End: 2018-02-12

## 2017-11-21 NOTE — LETTER
11/21/2017        RE: Kannan Ordonez  [address]  [place] [address]        Pt calls, got tyl #3 yesterday has only taken 1, gets real nauseated from.  Says the hellen med he can take without problems is hydrocodone.      Return Tylenol #3   lortab Rx    Spoke with pt, will return tyl#3 tabs when he picks up his script for Hydrocodone    Called pt, decided not to take the Lortab,   Printed scripted shredded.  Also requesting to increase his lexapro from 10mg to 20mg ???    Go ahead and increase lexapro per yeimy and follow up in 3 months      Sincerely,        Yeimy Corona, LEON

## 2017-11-21 NOTE — TELEPHONE ENCOUNTER
Pt calls, got tyl #3 yesterday has only taken 1, gets real nauseated from.  Says the hellen med he can take without problems is hydrocodone.

## 2017-11-21 NOTE — LETTER
11/21/2017        RE: Kannan Ordonez  [address]  [place] [address]        Pt calls, got tyl #3 yesterday has only taken 1, gets real nauseated from.  Says the hellen med he can take without problems is hydrocodone.      Return Tylenol #3   lortab Rx    Spoke with pt, will return tyl#3 tabs when he picks up his script for Hydrocodone    Called pt, decided not to take the Lortab,   Printed scripted shredded.  Also requesting to increase his lexapro from 10mg to 20mg ???    Go ahead and increase lexapro per yeimy and follow up in 3 months      Sincerely,        Yeimy Corona, [place]

## 2017-11-21 NOTE — LETTER
11/21/2017        RE: Kannan Ordonez  [address]  [place] [address]        Pt calls, got tyl #3 yesterday has only taken 1, gets real nauseated from.  Says the hellen med he can take without problems is hydrocodone.      Return Tylenol #3   lortab Rx    Spoke with pt, will return tyl#3 tabs when he picks up his script for Hydrocodone    Called pt, decided not to take the Lortab,   Printed scripted shredded.  Also requesting to increase his lexapro from 10mg to 20mg ???    Go ahead and increase lexapro per yeimy and follow up in 3 months      Sincerely,        Yeimy Corona, [place]

## 2017-11-27 ENCOUNTER — OFFICE VISIT (OUTPATIENT)
Dept: SURGERY | Facility: OTHER | Age: 27
End: 2017-11-27
Attending: NURSE PRACTITIONER
Payer: OTHER MISCELLANEOUS

## 2017-11-27 VITALS
HEIGHT: 74 IN | BODY MASS INDEX: 34.01 KG/M2 | TEMPERATURE: 98 F | OXYGEN SATURATION: 98 % | WEIGHT: 265 LBS | HEART RATE: 71 BPM | SYSTOLIC BLOOD PRESSURE: 132 MMHG | DIASTOLIC BLOOD PRESSURE: 72 MMHG

## 2017-11-27 DIAGNOSIS — T14.8XXA MUSCLE STRAIN: Primary | ICD-10-CM

## 2017-11-27 PROCEDURE — 99203 OFFICE O/P NEW LOW 30 MIN: CPT | Performed by: SURGERY

## 2017-11-27 ASSESSMENT — PAIN SCALES - GENERAL: PAINLEVEL: NO PAIN (0)

## 2017-11-27 NOTE — PATIENT INSTRUCTIONS
Thank you for allowing Dr. Jolley and our surgical team to participate in your care. Please call with any scheduling questions or concerns to [facility] at [phone] or for nursing questions Jac[facility] [phone].

## 2017-11-27 NOTE — PROGRESS NOTES
"Occupational Visit     Subjective:  Kannan Ordonez, 27 year old, male is seen 11/27/17.  The date of injury is 11/17/17.  The patient is employed at [employer].  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  Was in the squatting position for 10-20 minutes.  When completed with his task he stood up and felt a \"pop\" in the right groin associated with 6/10 constant sharp pain.  He was immediately evaluated at [facility] where a CT abdomen/pelvis was read as normal.  Since that event he's had intermittent 2-3/10 sharp pain that at times radiates to the scrotum. These pains come on unprovoked and self resolve within minutes.  Denies abdominal bloating, nausea, vomiting or changes in bowel habits.  Today he doesn't any pain.      Allergies   Allergen Reactions     Morphine Sulfate Rash     Cats Other (See Comments)     Other reaction(s): Eye Irritation, Sneezing     Dogs Other (See Comments)     Other reaction(s): Eye Irritation, Sneezing     Morphine Hives         Review of Systems:  Constitutional, neuro, ENT, endocrine, pulmonary, cardiac, gastrointestinal, genitourinary, musculoskeletal, integument and psychiatric systems are negative, except as otherwise noted.        OBJECTIVE:  Vitals: B/P: 132/72, T: 98, P: 71, R: Data Unavailable      Exam:  GENERAL: healthy, alert, well nourished, well hydrated, no distress  EYES: Eyes grossly normal to inspection, extraocular movements - intact, and PERRL  HENT: ear canals- normal; TMs- normal; Nose- normal; Mouth- no ulcers, no lesions  NECK: no tenderness, no adenopathy, no asymmetry, no masses, no stiffness; thyroid- normal to palpation  RESP: lungs clear to auscultation - no rales, no rhonchi, no wheezes  CV: regular rates and rhythm, normal S1 S2, no S3 or S4 and no murmur, no click or rub -  ABDOMEN: soft, no tenderness, no  hepatosplenomegaly, no masses, No inguinal masses felt on standing, laying down or performing restricted sit ups.   SKIN: no " suspicious lesions, no rashes  - male: testicles- normal, no atrophy, no masses;  no inguinal hernias      Labs:N/A      ASSESSMENT/PLAN:   #1 Muscle strain    No hernia appreciated.  May return to work without restrictions.

## 2017-11-27 NOTE — NURSING NOTE
"Chief Complaint   Patient presents with     Consult     Consult for right inguinal/testicular pain.  Yeimy Corona is referring.  Was on his knees lifting, stood up and heard a pop.         Initial /72 (BP Location: Left arm, Patient Position: Chair, Cuff Size: Adult Regular)  Pulse 71  Temp 98  F (36.7  C)  Ht 6' 2\" (1.88 m)  Wt 265 lb (120.2 kg)  SpO2 98%  BMI 34.02 kg/m2 Estimated body mass index is 34.02 kg/(m^2) as calculated from the following:    Height as of this encounter: 6' 2\" (1.88 m).    Weight as of this encounter: 265 lb (120.2 kg).  Medication Reconciliation: saray TOBIAS      "

## 2017-11-27 NOTE — MR AVS SNAPSHOT
"              After Visit Summary   11/27/2017    Kannan Ordonez    MRN: [id number]           Patient Information     Date Of Birth          1990        Visit Information        Provider Department      11/27/2017 9:30 AM Cristino Jolley, DO [facility]        Today's Diagnoses     Muscle strain    -  1      Care Instructions    Thank you for allowing Dr. Jolley and our surgical team to participate in your care. Please call with any scheduling questions or concerns to [facility] at [phone] or for nursing questions [place] [phone].            Follow-ups after your visit        Your next 10 appointments already scheduled     Nov 29, 2017  9:00 AM CST   New Sleep Patient with Aftab Snyder MD   [facility] ([facility] )    [address] [place] [id number] [phone]              Who to contact     If you have questions or need follow up information about today's clinic visit or your schedule please contact [facility] directly at [phone].  Normal or non-critical lab and imaging results will be communicated to you by Yuan[employer] Translationhart, letter or phone within 4 business days after the clinic has received the results. If you do not hear from us within 7 days, please contact the clinic through [employer] Translationhart or phone. If you have a critical or abnormal lab result, we will notify you by phone as soon as possible.  Submit refill requests through [employer] or call your pharmacy and they will forward the refill request to us. Please allow 3 business days for your refill to be completed.          Additional Information About Your Visit        Yuanpei Translationhart Information     [employer]uctomatic lets you send messages to your doctor, view your test results, renew your prescriptions, schedule appointments and more. To sign up, go to [email or web address] . Click on \"Log in\" on the left side of the screen, which will take you to the Welcome page. Then click on \"Sign up Now\" on the right " "side of the page.     You will be asked to enter the access code listed below, as well as some personal information. Please follow the directions to create your username and password.     Your access code is: [email or web address]  Expires: 2018  1:32 PM     Your access code will  in 90 days. If you need help or a new code, please call your [place] clinic or [phone].        Care EveryWhere ID     This is your Care EveryWhere ID. This could be used by other organizations to access your [place] medical records  [id number]        Your Vitals Were     Pulse Temperature Height Pulse Oximetry BMI (Body Mass Index)       71 98  F (36.7  C) 6' 2\" (1.88 m) 98% 34.02 kg/m2        Blood Pressure from Last 3 Encounters:   17 132/72   17 134/74   17 142/80    Weight from Last 3 Encounters:   17 265 lb (120.2 kg)   17 264 lb (119.7 kg)   17 267 lb (121.1 kg)              Today, you had the following     No orders found for display       Primary Care Provider Office Phone # Fax #    Yeimy Corona, [place] [phone] [address]  [place] [address]        Equal Access to Services     [facility] : Hadii moose varghese hadasho Soomaali, waaxda luqadaha, qaybta kaalmada adeegyada, leigh hatch . So [facility] [phone].    ATENCIÓN: Si habla español, tiene a underwood disposición servicios gratuitos de asistencia lingüística. Llame al [phone].    We comply with applicable federal civil rights laws and [place] laws. We do not discriminate on the basis of race, color, national origin, age, disability, sex, sexual orientation, or gender identity.            Thank you!     [facility]  [facility]. Our goal is always to provide you with excellent care. Hearing back from our patients is one way we can continue to improve our services. Please take a few minutes to complete the written survey that you may receive " in the mail after your visit with us. Thank you!             Your Updated Medication List - Protect others around you: Learn how to safely use, store and throw away your medicines at www.disposemymeds.org.          This list is accurate as of: 11/27/17 10:14 AM.  Always use your most recent med list.                   Brand Name Dispense Instructions for use Diagnosis    albuterol 108 (90 BASE) MCG/ACT Inhaler    PROAIR HFA/PROVENTIL HFA/VENTOLIN HFA    1 Inhaler    Inhale 2 puffs into the lungs every 6 hours as needed for shortness of breath / dyspnea    Mild intermittent asthma without complication       budesonide-formoterol 160-4.5 MCG/ACT Inhaler    SYMBICORT    3 Inhaler    Inhale 2 puffs into the lungs 2 times daily    Mild persistent asthma without complication       escitalopram 10 MG tablet    LEXAPRO    30 tablet    Take 1 tablet (10 mg) by mouth daily    Moderate episode of recurrent major depressive disorder (H)       HYDROcodone-acetaminophen 5-325 MG per tablet    NORCO    40 tablet    1-2 po BID PRN    Right testicular pain, Right inguinal pain       ibuprofen 800 MG tablet    ADVIL/MOTRIN     Take 800 mg by mouth

## 2017-11-28 ENCOUNTER — TELEPHONE (OUTPATIENT)
Dept: FAMILY MEDICINE | Facility: OTHER | Age: 27
End: 2017-11-28

## 2017-11-28 DIAGNOSIS — F33.1 MODERATE EPISODE OF RECURRENT MAJOR DEPRESSIVE DISORDER (H): ICD-10-CM

## 2017-11-28 RX ORDER — ESCITALOPRAM OXALATE 20 MG/1
20 TABLET ORAL DAILY
Qty: 30 TABLET | Refills: 1 | Status: SHIPPED | OUTPATIENT
Start: 2017-11-28 | End: 2018-03-02

## 2017-11-28 NOTE — TELEPHONE ENCOUNTER
Called pt, decided not to take the Lortab,   Printed scripted shredded.  Also requesting to increase his lexapro from 10mg to 20mg ???

## 2018-01-15 DIAGNOSIS — J45.20 MILD INTERMITTENT ASTHMA WITHOUT COMPLICATION: ICD-10-CM

## 2018-01-15 RX ORDER — ALBUTEROL SULFATE 90 UG/1
2 AEROSOL, METERED RESPIRATORY (INHALATION) EVERY 6 HOURS PRN
Qty: 1 INHALER | Refills: 1 | Status: SHIPPED | OUTPATIENT
Start: 2018-01-15 | End: 2018-08-23

## 2018-01-15 NOTE — TELEPHONE ENCOUNTER
albuterol (PROAIR HFA, PROVENTIL HFA, VENTOLIN HFA) 108 (90 BASE) MCG/ACT inhaler     Last Written Prescription Date:  11/7/16  Last Fill Quantity: 1,   # refills: 1  Last Office Visit: 11/20/17  Future Office visit:

## 2018-02-10 ENCOUNTER — TRANSFERRED RECORDS (OUTPATIENT)
Dept: HEALTH INFORMATION MANAGEMENT | Facility: HOSPITAL | Age: 28
End: 2018-02-10

## 2018-02-12 ENCOUNTER — TELEPHONE (OUTPATIENT)
Dept: FAMILY MEDICINE | Facility: OTHER | Age: 28
End: 2018-02-12

## 2018-02-12 DIAGNOSIS — J45.30 MILD PERSISTENT ASTHMA WITHOUT COMPLICATION: ICD-10-CM

## 2018-02-12 DIAGNOSIS — I10 BENIGN ESSENTIAL HYPERTENSION: ICD-10-CM

## 2018-02-12 DIAGNOSIS — S62.91XA CLOSED FRACTURE OF RIGHT HAND, INITIAL ENCOUNTER: Primary | ICD-10-CM

## 2018-02-12 PROBLEM — J98.9 REACTIVE AIRWAY DISEASE THAT IS NOT ASTHMA: Status: ACTIVE | Noted: 2018-02-12

## 2018-02-12 ASSESSMENT — ANXIETY QUESTIONNAIRES
GAD7 TOTAL SCORE: 0
3. WORRYING TOO MUCH ABOUT DIFFERENT THINGS: NOT AT ALL
2. NOT BEING ABLE TO STOP OR CONTROL WORRYING: NOT AT ALL
7. FEELING AFRAID AS IF SOMETHING AWFUL MIGHT HAPPEN: NOT AT ALL
4. TROUBLE RELAXING: NOT AT ALL
1. FEELING NERVOUS, ANXIOUS, OR ON EDGE: NOT AT ALL
5. BEING SO RESTLESS THAT IT IS HARD TO SIT STILL: NOT AT ALL
IF YOU CHECKED OFF ANY PROBLEMS ON THIS QUESTIONNAIRE, HOW DIFFICULT HAVE THESE PROBLEMS MADE IT FOR YOU TO DO YOUR WORK, TAKE CARE OF THINGS AT HOME, OR GET ALONG WITH OTHER PEOPLE: NOT DIFFICULT AT ALL
6. BECOMING EASILY ANNOYED OR IRRITABLE: NOT AT ALL

## 2018-02-12 NOTE — TELEPHONE ENCOUNTER
Pt unable to get into ortho this week, unable to get off work until 4PM, no appt availabel at that time, was offered appt Tue at 12:30, unable to take that time.  Suggested he go to  after hours and get his hqnd casted or splinted until he can see ortho next week.

## 2018-02-12 NOTE — TELEPHONE ENCOUNTER
Pt calls, broke his hand.  Was told it didn't need surgery, but should be casted.  Would like a splint on it , due to he needs to work, splint would be less cumbersome   Requesting to come in??  Or may be ortho???

## 2018-02-12 NOTE — TELEPHONE ENCOUNTER
Nothing in care everywhere  Where did he go?  When?  Which hand?  Should see ortho - [place] probably has something if you call over.  Will sign order once I have more information.      ALSO  PHQ on phone pls, never followed up.  Once updated, send depression action plan  ACT and asthma action plan on phone, overdue  Update health maintenance, send asthma action plan, mail to pt    Have him sched a future visit for chronic disease mgmt.    Yeimy CORNEJO  [phone]

## 2018-02-12 NOTE — TELEPHONE ENCOUNTER
Needs an ortho visit, special kind of cast.  Pt will see otho hopefully on Wed. He'll call Dr. Amado and see if Wed. appt is still available,

## 2018-02-13 ASSESSMENT — PATIENT HEALTH QUESTIONNAIRE - PHQ9: SUM OF ALL RESPONSES TO PHQ QUESTIONS 1-9: 0

## 2018-02-13 ASSESSMENT — ANXIETY QUESTIONNAIRES: GAD7 TOTAL SCORE: 0

## 2018-02-14 ENCOUNTER — TRANSFERRED RECORDS (OUTPATIENT)
Dept: HEALTH INFORMATION MANAGEMENT | Facility: HOSPITAL | Age: 28
End: 2018-02-14

## 2018-02-21 ENCOUNTER — TRANSFERRED RECORDS (OUTPATIENT)
Dept: HEALTH INFORMATION MANAGEMENT | Facility: HOSPITAL | Age: 28
End: 2018-02-21

## 2018-03-02 ENCOUNTER — OFFICE VISIT (OUTPATIENT)
Dept: FAMILY MEDICINE | Facility: OTHER | Age: 28
End: 2018-03-02
Attending: NURSE PRACTITIONER
Payer: COMMERCIAL

## 2018-03-02 VITALS
DIASTOLIC BLOOD PRESSURE: 78 MMHG | SYSTOLIC BLOOD PRESSURE: 128 MMHG | TEMPERATURE: 99 F | RESPIRATION RATE: 18 BRPM | OXYGEN SATURATION: 97 % | HEIGHT: 74 IN | WEIGHT: 258.6 LBS | HEART RATE: 77 BPM | BODY MASS INDEX: 33.19 KG/M2

## 2018-03-02 DIAGNOSIS — S62.91XS CLOSED FRACTURE OF RIGHT HAND, SEQUELA: Primary | ICD-10-CM

## 2018-03-02 DIAGNOSIS — E03.9 HYPOTHYROID: ICD-10-CM

## 2018-03-02 DIAGNOSIS — F33.1 MODERATE EPISODE OF RECURRENT MAJOR DEPRESSIVE DISORDER (H): ICD-10-CM

## 2018-03-02 LAB
T4 FREE SERPL-MCNC: 0.98 NG/DL (ref 0.76–1.46)
TSH SERPL DL<=0.005 MIU/L-ACNC: 2.58 MU/L (ref 0.4–4)

## 2018-03-02 PROCEDURE — 36415 COLL VENOUS BLD VENIPUNCTURE: CPT | Performed by: NURSE PRACTITIONER

## 2018-03-02 PROCEDURE — 99214 OFFICE O/P EST MOD 30 MIN: CPT | Performed by: NURSE PRACTITIONER

## 2018-03-02 PROCEDURE — 84439 ASSAY OF FREE THYROXINE: CPT | Performed by: NURSE PRACTITIONER

## 2018-03-02 PROCEDURE — 84443 ASSAY THYROID STIM HORMONE: CPT | Performed by: NURSE PRACTITIONER

## 2018-03-02 RX ORDER — HYDROCODONE BITARTRATE AND ACETAMINOPHEN 7.5; 325 MG/1; MG/1
TABLET ORAL
Qty: 40 TABLET | Refills: 0 | Status: SHIPPED | OUTPATIENT
Start: 2018-03-02 | End: 2018-03-19

## 2018-03-02 RX ORDER — ESCITALOPRAM OXALATE 20 MG/1
20 TABLET ORAL DAILY
Qty: 90 TABLET | Refills: 1 | Status: SHIPPED | OUTPATIENT
Start: 2018-03-02 | End: 2018-03-19

## 2018-03-02 ASSESSMENT — ANXIETY QUESTIONNAIRES
IF YOU CHECKED OFF ANY PROBLEMS ON THIS QUESTIONNAIRE, HOW DIFFICULT HAVE THESE PROBLEMS MADE IT FOR YOU TO DO YOUR WORK, TAKE CARE OF THINGS AT HOME, OR GET ALONG WITH OTHER PEOPLE: SOMEWHAT DIFFICULT
6. BECOMING EASILY ANNOYED OR IRRITABLE: SEVERAL DAYS
GAD7 TOTAL SCORE: 12
1. FEELING NERVOUS, ANXIOUS, OR ON EDGE: MORE THAN HALF THE DAYS
7. FEELING AFRAID AS IF SOMETHING AWFUL MIGHT HAPPEN: SEVERAL DAYS
3. WORRYING TOO MUCH ABOUT DIFFERENT THINGS: MORE THAN HALF THE DAYS
4. TROUBLE RELAXING: MORE THAN HALF THE DAYS
5. BEING SO RESTLESS THAT IT IS HARD TO SIT STILL: MORE THAN HALF THE DAYS
2. NOT BEING ABLE TO STOP OR CONTROL WORRYING: MORE THAN HALF THE DAYS

## 2018-03-02 ASSESSMENT — PAIN SCALES - GENERAL: PAINLEVEL: SEVERE PAIN (6)

## 2018-03-02 NOTE — LETTER
My Depression Action Plan  Name: Kannan Ordonez   Date of Birth 1990  Date: 3/2/2018    My doctor: Yeimy Corona   My clinic: [facility]  [address] [place] [address] [address]  [place] [address]  [phone]          GREEN    ZONE   Good Control    What it looks like:     Things are going generally well. You have normal up s and down s. You may even feel depressed from time to time, but bad moods usually last less than a day.   What you need to do:  1. Continue to care for yourself (see self care plan)  2. Check your depression survival kit and update it as needed  3. Follow your physician s recommendations including any medication.  4. Do not stop taking medication unless you consult with your physician first.           YELLOW         ZONE Getting Worse    What it looks like:     Depression is starting to interfere with your life.     It may be hard to get out of bed; you may be starting to isolate yourself from others.    Symptoms of depression are starting to last most all day and this has happened for several days.     You may have suicidal thoughts but they are not constant.   What you need to do:     1. Call your care team, your response to treatment will improve if you keep your care team informed of your progress. Yellow periods are signs an adjustment may need to be made.     2. Continue your self-care, even if you have to fake it!    3. Talk to someone in your support network    4. Open up your depression survival kit           RED    ZONE Medical Alert - Get Help    What it looks like:     Depression is seriously interfering with your life.     You may experience these or other symptoms: You can t get out of bed most days, can t work or engage in other necessary activities, you have trouble taking care of basic hygiene, or basic responsibilities, thoughts of suicide or death that will not go away, self-injurious behavior.     What you need to do:  1. Call your care team and  request a same-day appointment. If they are not available (weekends or after hours) call your local crisis line, emergency room or 911.      Electronically signed by: Yeimy Corona, March 2, 2018    Depression Self Care Plan / Survival Kit    Self-Care for Depression  Here s the deal. Your body and mind are really not as separate as most people think.  What you do and think affects how you feel and how you feel influences what you do and think. This means if you do things that people who feel good do, it will help you feel better.  Sometimes this is all it takes.  There is also a place for medication and therapy depending on how severe your depression is, so be sure to consult with your medical provider and/ or Behavioral Health Consultant if your symptoms are worsening or not improving.     In order to better manage my stress, I will:    Exercise  Get some form of exercise, every day. This will help reduce pain and release endorphins, the  feel good  chemicals in your brain. This is almost as good as taking antidepressants!  This is not the same as joining a gym and then never going! (they count on that by the way ) It can be as simple as just going for a walk or doing some gardening, anything that will get you moving.      Hygiene   Maintain good hygiene (Get out of bed in the morning, Make your bed, Brush your teeth, Take a shower, and Get dressed like you were going to work, even if you are unemployed).  If your clothes don't fit try to get ones that do.    Diet  I will strive to eat foods that are good for me, drink plenty of water, and avoid excessive sugar, caffeine, alcohol, and other mood-altering substances.  Some foods that are helpful in depression are: complex carbohydrates, B vitamins, flaxseed, fish or fish oil, fresh fruits and vegetables.    Psychotherapy  I agree to participate in Individual Therapy (if recommended).    Medication  If prescribed medications, I agree to take them.  Missing doses can  result in serious side effects.  I understand that drinking alcohol, or other illicit drug use, may cause potential side effects.  I will not stop my medication abruptly without first discussing it with my provider.    Staying Connected With Others  I will stay in touch with my friends, family members, and my primary care provider/team.    Use your imagination  Be creative.  We all have a creative side; it doesn t matter if it s oil painting, sand castles, or mud pies! This will also kick up the endorphins.    Witness Beauty  (AKA stop and smell the roses) Take a look outside, even in mid-winter. Notice colors, textures. Watch the squirrels and birds.     Service to others  Be of service to others.  There is always someone else in need.  By helping others we can  get out of ourselves  and remember the really important things.  This also provides opportunities for practicing all the other parts of the program.    Humor  Laugh and be silly!  Adjust your TV habits for less news and crime-drama and more comedy.    Control your stress  Try breathing deep, massage therapy, biofeedback, and meditation. Find time to relax each day.     My support system    Clinic Contact:  Phone number:    Contact 1:  Phone number:    Contact 2:  Phone number:    Druze/:  Phone number:    Therapist:  Phone number:    Local crisis center:    Phone number:    Other community support:  Phone number:

## 2018-03-02 NOTE — NURSING NOTE
"Chief Complaint   Patient presents with     Anxiety       Initial /86 (BP Location: Left arm, Patient Position: Chair, Cuff Size: Adult Large)  Pulse 77  Temp 99  F (37.2  C) (Tympanic)  Resp 18  Ht 6' 2\" (1.88 m)  Wt 258 lb 9.6 oz (117.3 kg)  SpO2 97%  BMI 33.2 kg/m2 Estimated body mass index is 33.2 kg/(m^2) as calculated from the following:    Height as of this encounter: 6' 2\" (1.88 m).    Weight as of this encounter: 258 lb 9.6 oz (117.3 kg).  Medication Reconciliation: complete   Pamela M Lechevalier LPN      "

## 2018-03-02 NOTE — MR AVS SNAPSHOT
After Visit Summary   3/2/2018    Kannan Ordonez    MRN: [id number]           Patient Information     Date Of Birth          1990        Visit Information        Provider Department      3/2/2018 8:45 AM Yeimy Corona, NP [facility]        Today's Diagnoses     Closed fracture of right hand, sequela    -  1    Moderate episode of recurrent major depressive disorder (H)          Care Instructions        1. Moderate episode of recurrent major depressive disorder (H)  - escitalopram (LEXAPRO) 20 MG tablet; Take 1 tablet (20 mg) by mouth daily  Dispense: 90 tablet; Refill: 1  - FU in 3 months    2. Closed fracture of right hand, sequela  - WEAR YOUR SPLINT  - Ice  - Ibuprofen PRN  - Rest  - Ortho FU as scheduled  - HYDROcodone-acetaminophen (NORCO) 7.5-325 MG per tablet; 1-2 po hs PRN pain  Dispense: 40 tablet; Refill: 0    FU 3 months    Yeimy Corona NP  [facility]            Follow-ups after your visit        Who to contact     If you have questions or need follow up information about today's clinic visit or your schedule please contact [facility] directly at [phone].  Normal or non-critical lab and imaging results will be communicated to you by MyChart, letter or phone within 4 business days after the clinic has received the results. If you do not hear from us within 7 days, please contact the clinic through [employer] nvhart or phone. If you have a critical or abnormal lab result, we will notify you by phone as soon as possible.  Submit refill requests through [employer] or call your pharmacy and they will forward the refill request to us. Please allow 3 business days for your refill to be completed.          Additional Information About Your Visit        MyChart Information     [employer] lets you send messages to your doctor, view your test results, renew your prescriptions, schedule appointments and more. To sign up, go to [email or web address] . Click on  "\"Log in\" on the left side of the screen, which will take you to the Welcome page. Then click on \"Sign up Now\" on the right side of the page.     You will be asked to enter the access code listed below, as well as some personal information. Please follow the directions to create your username and password.     Your access code is: [email or web address]  Expires: 2018  8:46 AM     Your access code will  in 90 days. If you need help or a new code, please call your [facility] or [phone].        Care EveryWhere ID     This is your Care EveryWhere ID. This could be used by other organizations to access your [place] medical records  [id number]        Your Vitals Were     Pulse Temperature Respirations Height Pulse Oximetry BMI (Body Mass Index)    77 99  F (37.2  C) (Tympanic) 18 6' 2\" (1.88 m) 97% 33.2 kg/m2       Blood Pressure from Last 3 Encounters:   18 128/78   17 132/72   17 134/74    Weight from Last 3 Encounters:   18 258 lb 9.6 oz (117.3 kg)   17 265 lb (120.2 kg)   17 264 lb (119.7 kg)              Today, you had the following     No orders found for display         Today's Medication Changes          These changes are accurate as of 3/2/18  8:46 AM.  If you have any questions, ask your nurse or doctor.               Start taking these medicines.        Dose/Directions    HYDROcodone-acetaminophen 7.5-325 MG per tablet   Commonly known as:  NORCO   Used for:  Closed fracture of right hand, sequela   Started by:  Yeimy Corona, LEON        1-2 po hs PRN pain   Quantity:  40 tablet   Refills:  0            Where to get your medicines      These medications were sent to [facility] [address]  [place], [place] - [address] [place] [address] [facility] [address]   [address] [address], [place] [address]     Phone:  [phone]     escitalopram 20 MG tablet         Some of these will need a paper prescription and others can be bought over the counter.  Ask your nurse " if you have questions.     Bring a paper prescription for each of these medications     HYDROcodone-acetaminophen 7.5-325 MG per tablet                Primary Care Provider Office Phone # Fax #    Yeimy NoriegaLEON mora [phone] [address]  [place] [address]        Equal Access to Services     JOSHUA ARAGON : Hadii aad ku hadasho Soomaali, waaxda luqadaha, qaybta kaalmada adeegyada, waxay chuckin hayaan adenathan metcalfnolbertogeri staley. So [facility] [phone].    ATENCIÓN: Si habla español, tiene a underwood disposición servicios gratuitos de asistencia lingüística. [facility] [phone].    We comply with applicable federal civil rights laws and [place] laws. We do not discriminate on the basis of race, color, national origin, age, disability, sex, sexual orientation, or gender identity.            Thank you!     Thank you for choosing [facility] [place]  for your care. Our goal is always to provide you with excellent care. Hearing back from our patients is one way we can continue to improve our services. Please take a few minutes to complete the written survey that you may receive in the mail after your visit with us. Thank you!             Your Updated Medication List - Protect others around you: Learn how to safely use, store and throw away your medicines at www.disposemymeds.org.          This list is accurate as of 3/2/18  8:46 AM.  Always use your most recent med list.                   Brand Name Dispense Instructions for use Diagnosis    albuterol 108 (90 BASE) MCG/ACT Inhaler    PROAIR HFA/PROVENTIL HFA/VENTOLIN HFA    1 Inhaler    Inhale 2 puffs into the lungs every 6 hours as needed for shortness of breath / dyspnea    Mild intermittent asthma without complication       budesonide-formoterol 160-4.5 MCG/ACT Inhaler    SYMBICORT    3 Inhaler    Inhale 2 puffs into the lungs 2 times daily    Mild persistent asthma without complication       escitalopram 20 MG tablet    LEXAPRO    90  tablet    Take 1 tablet (20 mg) by mouth daily    Moderate episode of recurrent major depressive disorder (H)       HYDROcodone-acetaminophen 7.5-325 MG per tablet    NORCO    40 tablet    1-2 po hs PRN pain    Closed fracture of right hand, sequela
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
## 2018-03-02 NOTE — PATIENT INSTRUCTIONS
1. Moderate episode of recurrent major depressive disorder (H)  - escitalopram (LEXAPRO) 20 MG tablet; Take 1 tablet (20 mg) by mouth daily  Dispense: 90 tablet; Refill: 1  - FU in 3 months    2. Closed fracture of right hand, sequela  - WEAR YOUR SPLINT  - Ice  - Ibuprofen PRN  - Rest  - Ortho FU as scheduled  - HYDROcodone-acetaminophen (NORCO) 7.5-325 MG per tablet; 1-2 po hs PRN pain  Dispense: 40 tablet; Refill: 0    FU 3 months    Yeimy Corona NP  [facility] ([facility]

## 2018-03-03 ASSESSMENT — ANXIETY QUESTIONNAIRES: GAD7 TOTAL SCORE: 12

## 2018-03-03 ASSESSMENT — PATIENT HEALTH QUESTIONNAIRE - PHQ9: SUM OF ALL RESPONSES TO PHQ QUESTIONS 1-9: 7

## 2018-03-19 ENCOUNTER — OFFICE VISIT (OUTPATIENT)
Dept: FAMILY MEDICINE | Facility: OTHER | Age: 28
End: 2018-03-19
Attending: NURSE PRACTITIONER
Payer: COMMERCIAL

## 2018-03-19 VITALS
TEMPERATURE: 96.8 F | WEIGHT: 257 LBS | BODY MASS INDEX: 32.98 KG/M2 | OXYGEN SATURATION: 97 % | HEART RATE: 73 BPM | HEIGHT: 74 IN | DIASTOLIC BLOOD PRESSURE: 78 MMHG | RESPIRATION RATE: 16 BRPM | SYSTOLIC BLOOD PRESSURE: 138 MMHG

## 2018-03-19 DIAGNOSIS — E55.9 VITAMIN D DEFICIENCY: ICD-10-CM

## 2018-03-19 DIAGNOSIS — S62.91XS CLOSED FRACTURE OF RIGHT HAND, SEQUELA: ICD-10-CM

## 2018-03-19 DIAGNOSIS — F33.1 MODERATE EPISODE OF RECURRENT MAJOR DEPRESSIVE DISORDER (H): Primary | ICD-10-CM

## 2018-03-19 PROCEDURE — 99214 OFFICE O/P EST MOD 30 MIN: CPT | Performed by: NURSE PRACTITIONER

## 2018-03-19 RX ORDER — ESCITALOPRAM OXALATE 20 MG/1
TABLET ORAL
Qty: 135 TABLET | Refills: 1 | Status: ON HOLD | OUTPATIENT
Start: 2018-03-19 | End: 2018-03-26

## 2018-03-19 RX ORDER — HYDROCODONE BITARTRATE AND ACETAMINOPHEN 7.5; 325 MG/1; MG/1
1 TABLET ORAL
Qty: 20 TABLET | Refills: 0 | Status: SHIPPED | OUTPATIENT
Start: 2018-03-19 | End: 2018-04-04

## 2018-03-19 ASSESSMENT — ANXIETY QUESTIONNAIRES
IF YOU CHECKED OFF ANY PROBLEMS ON THIS QUESTIONNAIRE, HOW DIFFICULT HAVE THESE PROBLEMS MADE IT FOR YOU TO DO YOUR WORK, TAKE CARE OF THINGS AT HOME, OR GET ALONG WITH OTHER PEOPLE: NOT DIFFICULT AT ALL
1. FEELING NERVOUS, ANXIOUS, OR ON EDGE: MORE THAN HALF THE DAYS
3. WORRYING TOO MUCH ABOUT DIFFERENT THINGS: NEARLY EVERY DAY
2. NOT BEING ABLE TO STOP OR CONTROL WORRYING: NEARLY EVERY DAY
7. FEELING AFRAID AS IF SOMETHING AWFUL MIGHT HAPPEN: SEVERAL DAYS
GAD7 TOTAL SCORE: 17
5. BEING SO RESTLESS THAT IT IS HARD TO SIT STILL: MORE THAN HALF THE DAYS
4. TROUBLE RELAXING: NEARLY EVERY DAY
6. BECOMING EASILY ANNOYED OR IRRITABLE: NEARLY EVERY DAY

## 2018-03-19 ASSESSMENT — PAIN SCALES - GENERAL: PAINLEVEL: SEVERE PAIN (6)

## 2018-03-19 NOTE — PROGRESS NOTES
SUBJECTIVE:   Kannan Ordonez is a 28 year old male who presents to clinic today for the following health issues:      Depression Followup    Status since last visit: Worsened     See PHQ-9 for current symptoms.  Other associated symptoms: None    Complicating factors:   Significant life event:  No   Current substance abuse:  None  Anxiety or Panic symptoms:  No        PHQ-9 2/12/2018 3/2/2018 3/19/2018   Total Score 0 7 15   Q9: Suicide Ideation Not at all Not at all Not at all       PHQ-9  English  PHQ-9   Any Language  Suicide Assessment Five-step Evaluation and Treatment (SAFE-T)        Right arm - fracture - sees ortho, upcoming surgery.  Appointment for FU is in 10 days          Amount of exercise or physical activity: None    Problems taking medications regularly: No    Medication side effects: none    Diet: regular (no restrictions)        Problem list and histories reviewed & adjusted, as indicated.  Additional history: as documented    Patient Active Problem List   Diagnosis     ACP (advance care planning)     Anxiety     Recurrent major depressive disorder (H)     Vitamin D deficiency     Heart murmur     Reactive airway disease that is not asthma     Past Surgical History:   Procedure Laterality Date     ORTHOPEDIC SURGERY  2015    r shoulder teat bone spur     ORTHOPEDIC SURGERY  2-2016    AC joint right     ORTHOPEDIC SURGERY  2016    Rt labrial tear     ORTHOPEDIC SURGERY  06/2017    revision decompression subpectoral biceps tenodesis        Social History   Substance Use Topics     Smoking status: Never Smoker     Smokeless tobacco: Current User     Types: Chew     Alcohol use Yes      Comment: occ     Family History   Problem Relation Age of Onset     DIABETES Mother      Hypertension Mother      Hyperlipidemia Mother      Coronary Artery Disease Father      Hyperlipidemia Father      Hypertension Father      Thyroid Disease No family hx of          Current Outpatient Prescriptions    Medication Sig Dispense Refill     cholecalciferol (VITAMIN D3) 5000 UNITS TABS tablet Take 1 tablet (5,000 Units) by mouth daily 90 tablet 11     escitalopram (LEXAPRO) 20 MG tablet 1.5 tabs daily for 30 mg 135 tablet 1     HYDROcodone-acetaminophen (NORCO) 7.5-325 MG per tablet 1-2 po hs PRN pain 40 tablet 0     albuterol (PROAIR HFA/PROVENTIL HFA/VENTOLIN HFA) 108 (90 BASE) MCG/ACT Inhaler Inhale 2 puffs into the lungs every 6 hours as needed for shortness of breath / dyspnea 1 Inhaler 1     budesonide-formoterol (SYMBICORT) 160-4.5 MCG/ACT Inhaler Inhale 2 puffs into the lungs 2 times daily 3 Inhaler 1     [DISCONTINUED] escitalopram (LEXAPRO) 20 MG tablet Take 1 tablet (20 mg) by mouth daily 90 tablet 1     Allergies   Allergen Reactions     Morphine Sulfate Rash     Cats Other (See Comments)     Other reaction(s): Eye Irritation, Sneezing     Dogs Other (See Comments)     Other reaction(s): Eye Irritation, Sneezing     Morphine Hives     Recent Labs   Lab Test  03/02/18   0902 11/17/17  10/24/17   1439  06/08/17   0922  05/15/17   1630  02/13/17   1020  11/07/16   1403   02/02/16   1519   08/28/15   1449   A1C   --    --   5.1   --    --    --    --    --    --    --    --    LDL   --    --    --    --   125*  107*  103*   < >   --    < >   --    HDL   --    --    --    --   62  46  47   < >   --    < >   --    TRIG   --    --    --    --   136  147  167*   < >   --    < >   --    ALT   --    --    --   82*   --    --    --    --   78*   --   51   CR   --   0.98  0.95  0.99  0.87  0.86  1.01   < >  1.19   < >  0.84   GFRESTIMATED   --    --   >90  >90  Non African American GFR Calc    >90  Non  GFR Calc    >90  Non  GFR Calc    89   < >  74   < >  >90  Non  GFR Calc     GFRESTBLACK   --    --   >90  >90  African American GFR Calc    >90   GFR Calc    >90   GFR Calc    >90   GFR Calc     < >  89   < >  >90    "American GFR Calc     POTASSIUM   --   3.7  3.9  4.3  3.7  4.6  3.9   < >  3.8   < >  4.1   TSH  2.58   --   1.94   --   3.08  1.92  2.86   < >  4.41*   < >   --     < > = values in this interval not displayed.      BP Readings from Last 3 Encounters:   03/19/18 138/78   03/02/18 128/78   11/27/17 132/72    Wt Readings from Last 3 Encounters:   03/19/18 257 lb (116.6 kg)   03/02/18 258 lb 9.6 oz (117.3 kg)   11/27/17 265 lb (120.2 kg)                  Labs reviewed in EPIC    Reviewed and updated as needed this visit by clinical staff  Tobacco  Allergies  Meds  Problems  Med Hx  Surg Hx  Fam Hx  Soc Hx        Reviewed and updated as needed this visit by Provider  Tobacco         ROS:  Constitutional, HEENT, cardiovascular, pulmonary, gi and gu systems are negative, except as otherwise noted.    OBJECTIVE:     /78 (BP Location: Left arm, Patient Position: Chair, Cuff Size: Adult Large)  Pulse 73  Temp 96.8  F (36  C) (Tympanic)  Resp 16  Ht 6' 2\" (1.88 m)  Wt 257 lb (116.6 kg)  SpO2 97%  BMI 33 kg/m2  Body mass index is 33 kg/(m^2).     GENERAL: healthy, alert and no distress  EYES: Eyes grossly normal to inspection, PERRL and conjunctivae and sclerae normal  NECK: no adenopathy, no asymmetry, masses, or scars and thyroid normal to palpation  RESP: lungs clear to auscultation - no rales, rhonchi or wheezes  CV: regular rate and rhythm, normal S1 S2, no S3 or S4, no murmur, click or rub, no peripheral edema and peripheral pulses strong  MS: RUE - splint in place  SKIN: no suspicious lesions or rashes  PSYCH: Mood a bit down, affect slightly blunted          ASSESSMENT/PLAN:     Depression; recurrent episode-- Moderate   Associated with the following complications:    None   Plan:  No changes in the patient's current treatment plan          1. Moderate episode of recurrent major depressive disorder (H)  - Counseling suggested, information given  - escitalopram (LEXAPRO) 20 MG tablet; 1.5 tabs daily " for 30 mg  Dispense: 135 tablet; Refill: 1      2. Vitamin D deficiency  - cholecalciferol (VITAMIN D3) 5000 UNITS TABS tablet; Take 1 tablet (5,000 Units) by mouth daily  Dispense: 90 tablet; Refill: 11      FU 1 month        Yeimy Corona NP  [facility]

## 2018-03-19 NOTE — NURSING NOTE
"Chief Complaint   Patient presents with     Depression       Initial /80 (BP Location: Left arm, Patient Position: Chair, Cuff Size: Adult Large)  Pulse 73  Temp 96.8  F (36  C) (Tympanic)  Resp 16  Ht 6' 2\" (1.88 m)  Wt 257 lb (116.6 kg)  SpO2 97%  BMI 33 kg/m2 Estimated body mass index is 33 kg/(m^2) as calculated from the following:    Height as of this encounter: 6' 2\" (1.88 m).    Weight as of this encounter: 257 lb (116.6 kg).  Medication Reconciliation: complete     Jacqueline Andre     "

## 2018-03-19 NOTE — LETTER
My Depression Action Plan  Name: Kannan Ordonez   Date of Birth 1990  Date: 3/19/2018    My doctor: Yeimy Corona   My clinic: [facility]  [address] [place] [address] [address]  [place] [address]  [phone]          GREEN    ZONE   Good Control    What it looks like:     Things are going generally well. You have normal up s and down s. You may even feel depressed from time to time, but bad moods usually last less than a day.   What you need to do:  1. Continue to care for yourself (see self care plan)  2. Check your depression survival kit and update it as needed  3. Follow your physician s recommendations including any medication.  4. Do not stop taking medication unless you consult with your physician first.           YELLOW         ZONE Getting Worse    What it looks like:     Depression is starting to interfere with your life.     It may be hard to get out of bed; you may be starting to isolate yourself from others.    Symptoms of depression are starting to last most all day and this has happened for several days.     You may have suicidal thoughts but they are not constant.   What you need to do:     1. Call your care team, your response to treatment will improve if you keep your care team informed of your progress. Yellow periods are signs an adjustment may need to be made.     2. Continue your self-care, even if you have to fake it!    3. Talk to someone in your support network    4. Open up your depression survival kit           RED    ZONE Medical Alert - Get Help    What it looks like:     Depression is seriously interfering with your life.     You may experience these or other symptoms: You can t get out of bed most days, can t work or engage in other necessary activities, you have trouble taking care of basic hygiene, or basic responsibilities, thoughts of suicide or death that will not go away, self-injurious behavior.     What you need to do:  1. Call your care team and  request a same-day appointment. If they are not available (weekends or after hours) call your local crisis line, emergency room or 911.            Depression Self Care Plan / Survival Kit    Self-Care for Depression  Here s the deal. Your body and mind are really not as separate as most people think.  What you do and think affects how you feel and how you feel influences what you do and think. This means if you do things that people who feel good do, it will help you feel better.  Sometimes this is all it takes.  There is also a place for medication and therapy depending on how severe your depression is, so be sure to consult with your medical provider and/ or Behavioral Health Consultant if your symptoms are worsening or not improving.     In order to better manage my stress, I will:    Exercise  Get some form of exercise, every day. This will help reduce pain and release endorphins, the  feel good  chemicals in your brain. This is almost as good as taking antidepressants!  This is not the same as joining a gym and then never going! (they count on that by the way ) It can be as simple as just going for a walk or doing some gardening, anything that will get you moving.      Hygiene   Maintain good hygiene (Get out of bed in the morning, Make your bed, Brush your teeth, Take a shower, and Get dressed like you were going to work, even if you are unemployed).  If your clothes don't fit try to get ones that do.    Diet  I will strive to eat foods that are good for me, drink plenty of water, and avoid excessive sugar, caffeine, alcohol, and other mood-altering substances.  Some foods that are helpful in depression are: complex carbohydrates, B vitamins, flaxseed, fish or fish oil, fresh fruits and vegetables.    Psychotherapy  I agree to participate in Individual Therapy (if recommended).    Medication  If prescribed medications, I agree to take them.  Missing doses can result in serious side effects.  I understand that  drinking alcohol, or other illicit drug use, may cause potential side effects.  I will not stop my medication abruptly without first discussing it with my provider.    Staying Connected With Others  I will stay in touch with my friends, family members, and my primary care provider/team.    Use your imagination  Be creative.  We all have a creative side; it doesn t matter if it s oil painting, sand castles, or mud pies! This will also kick up the endorphins.    Witness Beauty  (AKA stop and smell the roses) Take a look outside, even in mid-winter. Notice colors, textures. Watch the squirrels and birds.     Service to others  Be of service to others.  There is always someone else in need.  By helping others we can  get out of ourselves  and remember the really important things.  This also provides opportunities for practicing all the other parts of the program.    Humor  Laugh and be silly!  Adjust your TV habits for less news and crime-drama and more comedy.    Control your stress  Try breathing deep, massage therapy, biofeedback, and meditation. Find time to relax each day.     My support system    Clinic Contact:  Phone number:    Contact 1:  Phone number:    Contact 2:  Phone number:    [facility]/:  Phone number:    Therapist:  Phone number:    Local crisis center:    Phone number:    Other community support:  Phone number:

## 2018-03-19 NOTE — MR AVS SNAPSHOT
After Visit Summary   3/19/2018    Kannan Ordonez    MRN: [id number]           Patient Information     Date Of Birth          1990        Visit Information        Provider Department      3/19/2018 11:15 AM Yeimy Corona NP [facility]        Today's Diagnoses     Moderate episode of recurrent major depressive disorder (H)    -  1    Vitamin D deficiency          Care Instructions      1. Moderate episode of recurrent major depressive disorder (H)  - Counseling suggested, information given  - escitalopram (LEXAPRO) 20 MG tablet; 1.5 tabs daily for 30 mg  Dispense: 135 tablet; Refill: 1      2. Vitamin D deficiency  - cholecalciferol (VITAMIN D3) 5000 UNITS TABS tablet; Take 1 tablet (5,000 Units) by mouth daily  Dispense: 90 tablet; Refill: 11      FU 1 month        Yeimy Corona NP  [facility]            Follow-ups after your visit        Your next 10 appointments already scheduled     Mar 19, 2018 11:15 AM CDT   (Arrive by 11:00 AM)   [facility]    [address] [place] [id number] [phone]           Bring a current list of meds and any records pertaining to this visit.  For Physicals, please bring immunization records and any forms needing to be filled out.  Please arrive 15 minutes early to complete paperwork and register.            Jun 01, 2018  8:30 AM CDT   (Arrive by 8:15 AM)   SHORT with Yeimy Corona NP   [facility] ([facility] )    [address]  [place] [address]   [phone]              Who to contact     If you have questions or need follow up information about today's clinic visit or your schedule please contact [facility] directly at [phone].  Normal or non-critical lab and imaging results will be communicated to you by MyChart, letter or phone within 4 business days  "after the clinic has received the results. If you do not hear from us within 7 days, please contact the clinic through Dragonplay or phone. If you have a critical or abnormal lab result, we will notify you by phone as soon as possible.  Submit refill requests through Dragonplay or call your pharmacy and they will forward the refill request to us. Please allow 3 business days for your refill to be completed.          Additional Information About Your Visit        Dragonplay Information     Dragonplay lets you send messages to your doctor, view your test results, renew your prescriptions, schedule appointments and more. To sign up, go to [email or web address] . Click on \"Log in\" on the left side of the screen, which will take you to the Welcome page. Then click on \"Sign up Now\" on the right side of the page.     You will be asked to enter the access code listed below, as well as some personal information. Please follow the directions to create your username and password.     Your access code is: [email or web address]  Expires: 2018  9:46 AM     Your access code will  in 90 days. If you need help or a new code, please call your [facility] clinic or [phone].        Care EveryWhere ID     This is your Care EveryWhere ID. This could be used by other organizations to access your [facility] medical records  [id number]        Your Vitals Were     Pulse Temperature Respirations Height Pulse Oximetry BMI (Body Mass Index)    73 96.8  F (36  C) (Tympanic) 16 6' 2\" (1.88 m) 97% 33 kg/m2       Blood Pressure from Last 3 Encounters:   18 138/78   18 128/78   17 132/72    Weight from Last 3 Encounters:   18 257 lb (116.6 kg)   18 258 lb 9.6 oz (117.3 kg)   17 265 lb (120.2 kg)              Today, you had the following     No orders found for display         Today's Medication Changes          These changes are accurate as of 3/19/18 11:12 AM.  If you have any questions, ask your nurse or doctor.       "         Start taking these medicines.        Dose/Directions    cholecalciferol 5000 UNITS Tabs tablet   Commonly known as:  vitamin D3   Used for:  Vitamin D deficiency   Started by:  Yeimy Corona NP        Dose:  5000 Units   Take 1 tablet (5,000 Units) by mouth daily   Quantity:  90 tablet   Refills:  11         These medicines have changed or have updated prescriptions.        Dose/Directions    escitalopram 20 MG tablet   Commonly known as:  LEXAPRO   This may have changed:    - how much to take  - how to take this  - when to take this  - additional instructions   Used for:  Moderate episode of recurrent major depressive disorder (H)   Changed by:  Yeimy Corona NP        1.5 tabs daily for 30 mg   Quantity:  135 tablet   Refills:  1            Where to get your medicines      These medications were sent to [facility] [place], [place] [address] [facility] [address] [address]     Phone:  [phone]     escitalopram 20 MG tablet         Some of these will need a paper prescription and others can be bought over the counter.  Ask your nurse if you have questions.     You don't need a prescription for these medications     cholecalciferol 5000 UNITS Tabs tablet                Primary Care Provider Office Phone # Fax #    Yeimy Corona [phone] [address]  [place] [address]        Equal Access to Services     ZONIA ARAGON AH: Hadii moose varghese hadasho Solutherali, waaxda luqadaha, qaybta kaalmada adeegyada, leigh mcelroy haywendy staley. So [facility] [phone].    ATENCIÓN: Si habla español, tiene a underwood disposición servicios gratuitos de asistencia lingüística. Llame al [phone].    We comply with applicable federal civil rights laws and [place] laws. We do not discriminate on the basis of race, color, national origin, age, disability, sex, sexual orientation, or gender identity.            Thank you!      Thank you for choosing [facility]  for your care. Our goal is always to provide you with excellent care. Hearing back from our patients is one way we can continue to improve our services. Please take a few minutes to complete the written survey that you may receive in the mail after your visit with us. Thank you!             Your Updated Medication List - Protect others around you: Learn how to safely use, store and throw away your medicines at www.disposemymeds.org.          This list is accurate as of 3/19/18 11:12 AM.  Always use your most recent med list.                   Brand Name Dispense Instructions for use Diagnosis    albuterol 108 (90 BASE) MCG/ACT Inhaler    PROAIR HFA/PROVENTIL HFA/VENTOLIN HFA    1 Inhaler    Inhale 2 puffs into the lungs every 6 hours as needed for shortness of breath / dyspnea    Mild intermittent asthma without complication       budesonide-formoterol 160-4.5 MCG/ACT Inhaler    SYMBICORT    3 Inhaler    Inhale 2 puffs into the lungs 2 times daily    Mild persistent asthma without complication       cholecalciferol 5000 UNITS Tabs tablet    vitamin D3    90 tablet    Take 1 tablet (5,000 Units) by mouth daily    Vitamin D deficiency       escitalopram 20 MG tablet    LEXAPRO    135 tablet    1.5 tabs daily for 30 mg    Moderate episode of recurrent major depressive disorder (H)       HYDROcodone-acetaminophen 7.5-325 MG per tablet    NORCO    40 tablet    1-2 po hs PRN pain    Closed fracture of right hand, sequela

## 2018-03-19 NOTE — PATIENT INSTRUCTIONS
1. Moderate episode of recurrent major depressive disorder (H)  - Counseling suggested, information given  - escitalopram (LEXAPRO) 20 MG tablet; 1.5 tabs daily for 30 mg  Dispense: 135 tablet; Refill: 1      2. Vitamin D deficiency  - cholecalciferol (VITAMIN D3) 5000 UNITS TABS tablet; Take 1 tablet (5,000 Units) by mouth daily  Dispense: 90 tablet; Refill: 11      FU 1 month        Yeimy Corona NP  [facility]

## 2018-03-20 ASSESSMENT — ANXIETY QUESTIONNAIRES: GAD7 TOTAL SCORE: 17

## 2018-03-20 ASSESSMENT — PATIENT HEALTH QUESTIONNAIRE - PHQ9: SUM OF ALL RESPONSES TO PHQ QUESTIONS 1-9: 15

## 2018-03-25 ENCOUNTER — TELEPHONE (OUTPATIENT)
Dept: BEHAVIORAL HEALTH | Facility: CLINIC | Age: 28
End: 2018-03-25

## 2018-03-25 ENCOUNTER — TRANSFERRED RECORDS (OUTPATIENT)
Dept: HEALTH INFORMATION MANAGEMENT | Facility: HOSPITAL | Age: 28
End: 2018-03-25

## 2018-03-25 ENCOUNTER — HOSPITAL ENCOUNTER (INPATIENT)
Facility: HOSPITAL | Age: 28
LOS: 2 days | Discharge: HOME | DRG: 885 | End: 2018-03-27
Attending: PSYCHIATRY & NEUROLOGY | Admitting: PSYCHIATRY & NEUROLOGY
Payer: COMMERCIAL

## 2018-03-25 DIAGNOSIS — F33.1 MAJOR DEPRESSIVE DISORDER, RECURRENT EPISODE, MODERATE (H): Primary | ICD-10-CM

## 2018-03-25 PROBLEM — F32.A DEPRESSION WITH SUICIDAL IDEATION: Status: ACTIVE | Noted: 2018-03-25

## 2018-03-25 PROBLEM — R45.851 DEPRESSION WITH SUICIDAL IDEATION: Status: ACTIVE | Noted: 2018-03-25

## 2018-03-25 PROCEDURE — 25000132 ZZH RX MED GY IP 250 OP 250 PS 637: Performed by: NURSE PRACTITIONER

## 2018-03-25 PROCEDURE — 12400000 ZZH R&B MH

## 2018-03-25 PROCEDURE — 99223 1ST HOSP IP/OBS HIGH 75: CPT | Performed by: NURSE PRACTITIONER

## 2018-03-25 RX ORDER — FOLIC ACID 1 MG/1
1 TABLET ORAL DAILY
Status: DISCONTINUED | OUTPATIENT
Start: 2018-03-25 | End: 2018-03-27 | Stop reason: HOSPADM

## 2018-03-25 RX ORDER — OLANZAPINE 10 MG/2ML
10 INJECTION, POWDER, FOR SOLUTION INTRAMUSCULAR 3 TIMES DAILY PRN
Status: DISCONTINUED | OUTPATIENT
Start: 2018-03-25 | End: 2018-03-27 | Stop reason: HOSPADM

## 2018-03-25 RX ORDER — MULTIPLE VITAMINS W/ MINERALS TAB 9MG-400MCG
1 TAB ORAL DAILY
Status: DISCONTINUED | OUTPATIENT
Start: 2018-03-25 | End: 2018-03-27 | Stop reason: HOSPADM

## 2018-03-25 RX ORDER — ALUMINA, MAGNESIA, AND SIMETHICONE 2400; 2400; 240 MG/30ML; MG/30ML; MG/30ML
30 SUSPENSION ORAL EVERY 4 HOURS PRN
Status: DISCONTINUED | OUTPATIENT
Start: 2018-03-25 | End: 2018-03-27 | Stop reason: HOSPADM

## 2018-03-25 RX ORDER — LANOLIN ALCOHOL/MO/W.PET/CERES
100 CREAM (GRAM) TOPICAL DAILY
Status: COMPLETED | OUTPATIENT
Start: 2018-03-25 | End: 2018-03-27

## 2018-03-25 RX ORDER — CLONIDINE HYDROCHLORIDE 0.1 MG/1
0.1 TABLET ORAL 3 TIMES DAILY PRN
Status: DISCONTINUED | OUTPATIENT
Start: 2018-03-25 | End: 2018-03-27 | Stop reason: HOSPADM

## 2018-03-25 RX ORDER — BISACODYL 10 MG
10 SUPPOSITORY, RECTAL RECTAL DAILY PRN
Status: DISCONTINUED | OUTPATIENT
Start: 2018-03-25 | End: 2018-03-27 | Stop reason: HOSPADM

## 2018-03-25 RX ORDER — OLANZAPINE 10 MG/1
10 TABLET ORAL 3 TIMES DAILY PRN
Status: DISCONTINUED | OUTPATIENT
Start: 2018-03-25 | End: 2018-03-27 | Stop reason: HOSPADM

## 2018-03-25 RX ORDER — DIAZEPAM 5 MG
5-20 TABLET ORAL EVERY 30 MIN PRN
Status: DISCONTINUED | OUTPATIENT
Start: 2018-03-25 | End: 2018-03-27 | Stop reason: HOSPADM

## 2018-03-25 RX ORDER — HYDROXYZINE HYDROCHLORIDE 25 MG/1
25-50 TABLET, FILM COATED ORAL EVERY 4 HOURS PRN
Status: DISCONTINUED | OUTPATIENT
Start: 2018-03-25 | End: 2018-03-27 | Stop reason: HOSPADM

## 2018-03-25 RX ORDER — ACETAMINOPHEN 325 MG/1
650 TABLET ORAL EVERY 4 HOURS PRN
Status: DISCONTINUED | OUTPATIENT
Start: 2018-03-25 | End: 2018-03-27 | Stop reason: HOSPADM

## 2018-03-25 RX ORDER — TRAZODONE HYDROCHLORIDE 50 MG/1
50 TABLET, FILM COATED ORAL
Status: DISCONTINUED | OUTPATIENT
Start: 2018-03-25 | End: 2018-03-27 | Stop reason: HOSPADM

## 2018-03-25 RX ADMIN — DIAZEPAM 5 MG: 5 TABLET ORAL at 18:03

## 2018-03-25 RX ADMIN — Medication 100 MG: at 14:42

## 2018-03-25 RX ADMIN — MULTIPLE VITAMINS W/ MINERALS TAB: TAB at 14:42

## 2018-03-25 RX ADMIN — FOLIC ACID 1 MG: 1 TABLET ORAL at 14:42

## 2018-03-25 ASSESSMENT — ACTIVITIES OF DAILY LIVING (ADL)
SWALLOWING: 0-->SWALLOWS FOODS/LIQUIDS WITHOUT DIFFICULTY
TOILETING: 0-->INDEPENDENT
ORAL_HYGIENE: INDEPENDENT;PROMPTS
ORAL_HYGIENE: INDEPENDENT
FALL_HISTORY_WITHIN_LAST_SIX_MONTHS: NO
DRESS: 0-->INDEPENDENT
GROOMING: PROMPTS;INDEPENDENT
LAUNDRY: UNABLE TO COMPLETE
AMBULATION: 0-->INDEPENDENT
DRESS: SCRUBS (BEHAVIORAL HEALTH);INDEPENDENT
DRESS: SCRUBS (BEHAVIORAL HEALTH);INDEPENDENT;PROMPTS
RETIRED_EATING: 0-->INDEPENDENT
COGNITION: 0 - NO COGNITION ISSUES REPORTED
TRANSFERRING: 0-->INDEPENDENT
BATHING: 0-->INDEPENDENT
RETIRED_COMMUNICATION: 0-->UNDERSTANDS/COMMUNICATES WITHOUT DIFFICULTY
GROOMING: INDEPENDENT
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
## 2018-03-25 NOTE — LETTER
[address] [address]  Phone: [phone]  Fax: [phone]    03/27/18    Kannan Ordonez  [address] [address]      To whom it may concern:     Kannan was hospitalized from 3/25/18-3/27/18. He is able to return to work without restrictions as of today.    Sincerely,      Yolanda Do, SANA-CNP

## 2018-03-25 NOTE — PLAN OF CARE
Face to face end of shift report received from Estela Pickens RN. Rounding completed. Patient observed.     Mile Koehler  3/25/2018  4:22 PM

## 2018-03-25 NOTE — TELEPHONE ENCOUNTER
S: [facility] called about a 29 yo male BIB police for SI    B: Pt reportedly drinking with a friend (8 beers total) and became suicidal. Pt had a loaded gun and was threatening to shoot himself. Friend took gun away. Pt states he feels shitty and doesn't want to be here anymore. PT cursing a refusing labs at 230 am. Pt given IM zyprexa and ativan at 230am.  Pt eventually calmed and staff were able to collect labs. Pt not on any psych medications and has no OP provider.     A: UTOX pos for Benzos. Pt is on a hold.BA at .22 (value 222.7)at 345am.

## 2018-03-25 NOTE — IP AVS SNAPSHOT
MRN:[id number]                      After Visit Summary   3/25/2018    Kannan Ordonez    MRN: [id number]           Thank you!     Thank you for choosing [facility] for your care. Our goal is always to provide you with excellent care. Hearing back from our patients is one way we can continue to improve our services. Please take a few minutes to complete the written survey that you may receive in the mail after you visit with us. Thank you!        Patient Information     Date Of Birth          1990        Designated Caregiver       Most Recent Value    Caregiver    Will someone help with your care after discharge? no      About your hospital stay     You were admitted on:  March 25, 2018 You last received care in the:  [facility]    You were discharged on:  March 27, 2018       Who to Call     For medical emergencies, please call 911.  For non-urgent questions about your medical care, please call your primary care provider or clinic, [phone]          Attending Provider     Provider Specialty    Steven Gonzalez MD Psychiatry    Yolanda Do NP Licensed Mental Health       Primary Care Provider Office Phone # Fax #    Yeimy Corona [phone]      Your next 10 appointments already scheduled     Apr 10, 2018  8:30 AM CDT   (Arrive by 8:15 AM)   [facility]    [address]  [place] [address]   [phone]            Jun 01, 2018  8:30 AM CDT   (Arrive by 8:15 AM)   SHORT with Yeimy Corona NP   [facility] ([facility] )    [address]  [place] [address]   [phone]              Further instructions from your care team       Behavioral Discharge Planning and Instructions    Summary: Patient was admitted with SI.     Main Diagnosis: Major Depressive episode-moderate    Major Treatments, Procedures and  Findings: Stabilize with medications, connect with community programs.    Symptoms to Report: feeling more aggressive, increased confusion, losing more sleep, mood getting worse or thoughts of suicide    Lifestyle Adjustment: Take all medications as prescribed, meet with doctor/ medication provider, out patient therapist, , and [facility] worker as scheduled. Abstain from alcohol or any unprescribed drugs.    Psychiatry Follow-up:     [facility]  PCP- Yeimy Corona- April 10th at 8:15am and June 1st at 8:15am   [address] [facility] [address]  Phone- [phone]  Fax- [phone]    [facility]  DA/Therapy- Lisa Monzon- April 3rd at 9am, Please arrive 15 min prior to apt to complete paperwork   [address] [place], [place] [address] [phone]  Fax: [phone]    [facility]   [address] [place]  [address]  Phone - [phone]  Fax - [phone]    Chemical Health assessment resources:    [facility]  [address] [place]  [address]  Phone - [phone]  Fax - [phone]    [facility]  [address], [place]  [address]  Phone - [phone]  Fax - [phone]    [facility]  [address] [place] [address]  office :[phone]  fax: [phone]      Resources:   Crisis Intervention: 588.670.9068 or 017-916-2013 (TTY: 790.255.8311).  Call anytime for help.  National [place] on Mental Illness (www.[place].yonatan.org): 383.552.1397 or 258-184-9628.  Alcoholics Anonymous (www.alcoholics-anonymous.org): Check your phone book for your local chapter.  Suicide Awareness Voices of Education (SAVE) (www.save.org): 269-659-ITGL (4546)  National Suicide Prevention Line (www.mentalhealthmn.org): 961-464-IIEL (7353)  Mental Health Consumer/Survivor Network of [place] (www.mhcsn.net): 611.337.3962 or 399-936-1363  Mental Health Association of [place] (www.mentalhealth.org): 433.594.9995 or 283-276-1721    General Medication Instructions:   See your  medication sheet(s) for instructions.   Take all medicines as directed.  Make no changes unless your doctor suggests them.   Go to all your doctor visits.  Be sure to have all your required lab tests. This way, your medicines can be refilled on time.  Do not use any drugs not prescribed by your doctor.  Avoid alcohol.    [place] Area:  [facility], Crisis stabilization housing- 531.189.3242  [facility] Crisis Line: 1-953.836.7228  Advocates For Family Peace: 141-6609  Sexual Assault Program [facility]: 913.965.8208 or 1-608.644.3490  [place] Battered Women's Program: 1-147.834.4337 Ext: 279       Calls answered Mon-Fri-8:00 am--4:30 pm    [place]:  Advocates for Family Peace: 1-881.396.2660  [facility] first call for help: 3-741-846-0027  [facility] Crisis Center:  (343) 626-8165      [place] Area:  Warm Line: 1-543.324.6909       Calls answered Tuesday--Saturday 4:00 pm--10:00 pm  [facility] Crisis Line - 626.951.2719  [facility] Crisis Stabilization 107-099-9053    [place] Statewide:  [place] Crisis and Referral Services: 1-598.160.5382  National Suicide Prevention Lifeline: 8-049-363-TALK (9044)   - lfx2bmpj- Text  Life  to [address]  First Call for Help: 2-1-1  [facility] Helpline- 7-997-VWKD-HELP      Pending Results     No orders found from 3/23/2018 to 3/26/2018.            Statement of Approval     Ordered          03/27/18 0850  I have reviewed and agree with all the recommendations and orders detailed in this document.  EFFECTIVE NOW     Approved and electronically signed by:  Yolanda Do NP             Admission Information     Date & Time Provider Department Dept. Phone    3/25/2018 Yolanda Do NP [facility] [phone]      Your Vitals Were     Blood Pressure Pulse Temperature Respirations Pulse Oximetry       144/73 80 97.6  F (36.4  C) (Tympanic) 16 96%       MyChart Information     MyChart lets you send messages to your doctor, view your test results, renew your  "prescriptions, schedule appointments and more. To sign up, go to [email or web address] . Click on \"Log in\" on the left side of the screen, which will take you to the Welcome page. Then click on \"Sign up Now\" on the right side of the page.     You will be asked to enter the access code listed below, as well as some personal information. Please follow the directions to create your username and password.     Your access code is: [email or web address]  Expires: 2018  9:46 AM     Your access code will  in 90 days. If you need help or a new code, please call your [facility] clinic or [phone].        Care EveryWhere ID     This is your Care EveryWhere ID. This could be used by other organizations to access your [facility] medical records  [id number]        Equal Access to Services     ZONIA ARAGON : Funmilayo Lara, yue ortiz, leigha recinos, leigh hatch . So [facility] [phone].    ATENCIÓN: Si habla español, tiene a underwood disposición servicios gratuitos de asistencia lingüística. Thu al [phone].    We comply with applicable federal civil rights laws and [place] laws. We do not discriminate on the basis of race, color, national origin, age, disability, sex, sexual orientation, or gender identity.               Review of your medicines      START taking        Dose / Directions    buPROPion 150 MG 24 hr tablet   Commonly known as:  WELLBUTRIN XL   Used for:  Major depressive disorder, recurrent episode, moderate (H)        Dose:  150 mg   Take 1 tablet (150 mg) by mouth daily   Quantity:  30 tablet   Refills:  0         CONTINUE these medicines which have NOT CHANGED        Dose / Directions    albuterol 108 (90 BASE) MCG/ACT Inhaler   Commonly known as:  PROAIR HFA/PROVENTIL HFA/VENTOLIN HFA   Used for:  Mild intermittent asthma without complication        Dose:  2 puff   Inhale 2 puffs into the lungs every 6 hours as needed for shortness of breath / " dyspnea   Quantity:  1 Inhaler   Refills:  1       budesonide-formoterol 160-4.5 MCG/ACT Inhaler   Commonly known as:  SYMBICORT   Used for:  Mild persistent asthma without complication        Dose:  2 puff   Inhale 2 puffs into the lungs 2 times daily   Quantity:  3 Inhaler   Refills:  1       HYDROcodone-acetaminophen 7.5-325 MG per tablet   Commonly known as:  NORCO   Used for:  Closed fracture of right hand, sequela        Dose:  1 tablet   Take 1 tablet by mouth nightly as needed for moderate to severe pain   Quantity:  20 tablet   Refills:  0         STOP taking     escitalopram 20 MG tablet   Commonly known as:  LEXAPRO                Where to get your medicines      These medications were sent to [facility] [address] [place] [address] [address] AT [facility] OF Critical access hospital [address], [place] [address]     Phone:  [phone]     buPROPion 150 MG 24 hr tablet                Protect others around you: Learn how to safely use, store and throw away your medicines at www.disposemymeds.org.             Medication List: This is a list of all your medications and when to take them. Check marks below indicate your daily home schedule. Keep this list as a reference.      Medications           Morning Afternoon Evening Bedtime As Needed    albuterol 108 (90 BASE) MCG/ACT Inhaler   Commonly known as:  PROAIR HFA/PROVENTIL HFA/VENTOLIN HFA   Inhale 2 puffs into the lungs every 6 hours as needed for shortness of breath / dyspnea                                budesonide-formoterol 160-4.5 MCG/ACT Inhaler   Commonly known as:  SYMBICORT   Inhale 2 puffs into the lungs 2 times daily                                buPROPion 150 MG 24 hr tablet   Commonly known as:  WELLBUTRIN XL   Take 1 tablet (150 mg) by mouth daily   Last time this was given:  150 mg on 3/27/2018  8:15 AM                                HYDROcodone-acetaminophen 7.5-325 MG per tablet   Commonly known as:  NORCO   Take 1  tablet by mouth nightly as needed for moderate to severe pain

## 2018-03-25 NOTE — IP AVS SNAPSHOT
[facility]    [address] [place] [address]    Phone:  [phone]    Fax:  [phone]                                       After Visit Summary   3/25/2018    Kannan Ordonez    MRN: [id number]           After Visit Summary Signature Page     I have received my discharge instructions, and my questions have been answered. I have discussed any challenges I see with this plan with the nurse or doctor.    ..........................................................................................................................................  Patient/Patient Representative Signature      ..........................................................................................................................................  Patient Representative Print Name and Relationship to Patient    ..................................................               ................................................  Date                                            Time    ..........................................................................................................................................  Reviewed by Signature/Title    ...................................................              ..............................................  Date                                                            Time

## 2018-03-25 NOTE — PROGRESS NOTES
03/25/18 1428   Patient Belongings   Did you bring any home meds/supplements to the hospital?  No   Patient Belongings cell phone/electronics;clothing;shoes;wallet   Disposition of Belongings Locker  (belongings sent to cubby in patient belongings room)   Belongings Search Yes   Clothing Search Yes   Second Staff Jana SHEPPARD   General Info Comment silver jeans, americ[employer] eagle boxers (green), 1 pair white socks, brown leather boots, 1 can snuff, cell phone adapter    List items sent to safe:gold jose sung phone, black wallet, [place],2 united health care cards, permit to carry pistol , 2 company id cards , several misc. Cards    All other belongings put in assigned cubby in belongings room.     I have reviewed my belongings list on admission and verify that it is correct.     Patient signature_______________________________    Second staff witness (if patient unable to sign) ______________________________       I have received all my belongings at discharge.    Patient signature________________________________    Samantha   3/25/2018  2:32 PM
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
## 2018-03-26 PROCEDURE — 25000132 ZZH RX MED GY IP 250 OP 250 PS 637: Performed by: NURSE PRACTITIONER

## 2018-03-26 PROCEDURE — 12400000 ZZH R&B MH

## 2018-03-26 PROCEDURE — 99239 HOSP IP/OBS DSCHRG MGMT >30: CPT | Performed by: NURSE PRACTITIONER

## 2018-03-26 RX ORDER — ALBUTEROL SULFATE 90 UG/1
2 AEROSOL, METERED RESPIRATORY (INHALATION) EVERY 6 HOURS PRN
Status: DISCONTINUED | OUTPATIENT
Start: 2018-03-26 | End: 2018-03-27 | Stop reason: HOSPADM

## 2018-03-26 RX ORDER — BUPROPION HYDROCHLORIDE 150 MG/1
150 TABLET ORAL DAILY
Status: DISCONTINUED | OUTPATIENT
Start: 2018-03-26 | End: 2018-03-27 | Stop reason: HOSPADM

## 2018-03-26 RX ORDER — BUPROPION HYDROCHLORIDE 150 MG/1
150 TABLET ORAL DAILY
Qty: 30 TABLET | Refills: 0 | Status: SHIPPED | OUTPATIENT
Start: 2018-03-27 | End: 2018-04-20

## 2018-03-26 RX ADMIN — DIAZEPAM 5 MG: 5 TABLET ORAL at 18:03

## 2018-03-26 RX ADMIN — DIAZEPAM 5 MG: 5 TABLET ORAL at 12:08

## 2018-03-26 RX ADMIN — FLUTICASONE FUROATE AND VILANTEROL TRIFENATATE 1 PUFF: 200; 25 POWDER RESPIRATORY (INHALATION) at 12:03

## 2018-03-26 RX ADMIN — DIAZEPAM 5 MG: 5 TABLET ORAL at 06:04

## 2018-03-26 RX ADMIN — FOLIC ACID 1 MG: 1 TABLET ORAL at 08:05

## 2018-03-26 RX ADMIN — MULTIPLE VITAMINS W/ MINERALS TAB 1 TABLET: TAB at 08:05

## 2018-03-26 RX ADMIN — BUPROPION HYDROCHLORIDE 150 MG: 150 TABLET, FILM COATED, EXTENDED RELEASE ORAL at 12:03

## 2018-03-26 RX ADMIN — Medication 100 MG: at 08:05

## 2018-03-26 ASSESSMENT — ACTIVITIES OF DAILY LIVING (ADL)
GROOMING: INDEPENDENT
GROOMING: INDEPENDENT
DRESS: SCRUBS (BEHAVIORAL HEALTH);INDEPENDENT
ORAL_HYGIENE: INDEPENDENT
ORAL_HYGIENE: INDEPENDENT
DRESS: INDEPENDENT
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
## 2018-03-26 NOTE — PLAN OF CARE
Problem: Patient Care Overview  Goal: Individualization & Mutuality  Patient will complete ADLs without prompts.    Patient will be in agreeance with treatment team recommendations.   Patient will attend at least 50% of groups.   Patient will report having no suicidal ideation by discharge.      Outcome: No Change  Pt has been in bed with eyes closed and regular respirations X 7 hours. 15 minute and PRN checks all night. No complaints offered. Will continue to monitor. 0600 MSSA score of 8. Valium 5 mg po administered @ 0604    Na Greene  3/26/2018  6:22 AM

## 2018-03-26 NOTE — PLAN OF CARE
Face to face end of shift report received from aldair SULLIVAN RN. Rounding completed. Patient observed resting in bed.     Clovis Luna  3/26/2018  8:34 AM

## 2018-03-26 NOTE — PLAN OF CARE
Problem: Patient Care Overview  Goal: Individualization & Mutuality  Patient will complete ADLs without prompts.    Patient will be in agreeance with treatment team recommendations.   Patient will attend at least 50% of groups.   Patient will report having no suicidal ideation by discharge.      Outcome: Improving  Patient had a blunted affect. He endorsed mild depression and anxiety, but denied suicidal ideation. Patient did not have any intake for supper - he scored an 8 on the MSSA scale and was given 5 mg of PRN Valium at 1803. Pt had snack tonight. He scored a 6 on the MSSA scale at 2049 and did not require PRN medication. Patient had several visitors tonight. He verbalized that he is looking forward to discharging tomorrow.     Problem: Suicide Risk (Adult)  Goal: Strength-Based Wellness/Recovery  Patient will report an absence of suicidal ideation by discharge.   Patient will contract for safety while on the unit.    Outcome: Improving  Patient denied suicidal ideation.

## 2018-03-26 NOTE — PLAN OF CARE
Face to face end of shift report received from Mile COLBERT RN. Rounding completed. Patient observed.     Na Greene  3/26/2018  12:07 AM

## 2018-03-26 NOTE — PLAN OF CARE
Face to face end of shift report received from Clovis HALL RN. Rounding completed. Patient observed resting in bed.      Karen Hudson  3/26/2018  4:33 PM

## 2018-03-26 NOTE — PLAN OF CARE
"Problem: Patient Care Overview  Goal: Individualization & Mutuality  Patient will complete ADLs without prompts.    Patient will be in agreeance with treatment team recommendations.   Patient will attend at least 50% of groups.   Patient will report having no suicidal ideation by discharge.      Outcome: No Change  Pt has been resting in bed and appears to be sleeping this AM. Pt denied having any depression, HI, SI, and hallucinations. He did report to being \"very\" anxious and stated that he would take more of the \"V medication\" he got this AM because it made him feel \"great.\" When pt was reporting his anxiety he was laying in bed with his eyes closed and appeared to have fallen back asleep before writer even left the room. Pt did not eat any of his breakfast due to sleeping and when pt was woken up to ask about his breakfast he stated that at home he does not normally eat breakfast. /76  Pulse 58  Temp 97.8  F (36.6  C) (Tympanic)  Resp 16  SpO2 94% Pt scored a 2 on the MSSA at 0855. Writer scored a 0 for pt on eating disturbance due to pt reporting that it is normal for him not to have any breakfast. Pt also has a lower pulse of 58 but pt appeared to have fallen back asleep while completing vitals. Will continue to monitor.     1206- Pt scored a 10 on the MSSA and received 5 mg of valium at this time.     Problem: Suicide Risk (Adult)  Goal: Strength-Based Wellness/Recovery  Patient will report an absence of suicidal ideation by discharge.   Patient will contract for safety while on the unit.    Outcome: No Change  Pt denied having any SI and this time and agreed to contact staff with any thoughts.       "
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
## 2018-03-26 NOTE — PLAN OF CARE
Problem: Patient Care Overview  Goal: Team Discussion  Team Plan:   BEHAVIORAL TEAM DISCUSSION    Participants: Yolanda Do NP,Kenny Silva NP, Leonor Zavala NP,  Ruba Danielle LICSW, Sarah Olson LSW,  Alissa Curry LSW, Heather Mahan OT, Susana Koehler RN  Progress: New Patient   Continued Stay Criteria/Rationale: Provider and SW to assess   Medical/Physical: Fractured hand   Precautions:   Behavioral Orders   Procedures     Code 1 - Restrict to Unit     Routine Programming     As clinically indicated     Self Injury Precaution     Status 15     Every 15 minutes.     Plan: Provider and SW to assess   Rationale for change in precautions or plan: None

## 2018-03-26 NOTE — PLAN OF CARE
"Social Service Psychosocial Assessment  Presenting Problem:   Patient was brought to the ED by police- Pt reported having 8 beers since noon. PD was called because pt had a loaded gun and was threatening to kill self. ED notes state pt reported felling \"shitty\" and not wanting to be here anymore. States his mom works for [employer] and he talked to her about his relationship problems and wanting to get help. He then went out drinking with a friend and stated he wanted to kill himself and the friend took his gun away. Pt states he was drinking with a friend and has been going through a rough time and didn't want to be here. Talks about issues with his child and relationships and that \"it's a crappy situation.\"   Marital Status:   Single   Spouse / Children:    11 month old  Psychiatric TX HX:   Denies any previous inpt hospitalizations. History of depression for the past 3-4 years   Suicide Risk Assessment:  Patient was admitted with SI-  He had a loaded gun and was threatening to kill self. Denies any past suicide attempts. Denies SI today- States he is doing \"better\" and is not as upset with himself.   Access to Lethal Means (explain):   Yes- Will call pt's mom or ex girlfriend Yolanda to remove guns from home.   Family Psych HX:   None reported   A & Ox:   x3  Medication Adherence:   Unknown  Medical Issues:   See H&P  Visual  Motor Functioning:   Ok  Communication Skills /Needs:   Ok  Ethnicity:   White     Spirituality/Mosque Affiliation:   Norbert    Clersandra Request:   No   History:   None reported   Living Situation: Lives in [place] with his ex girlfriend/ child's mom and child   ADL s:  Independent   Education: Graduated HS- 2 year  degree   Financial Situation:   Okay   Occupation: Works full time for [employer]   Leisure & Recreation:  Unknown   Childhood History:   Born and raised in the [place] area. Raised by mom and dad- they are still  and  supportive. Has a twin brother who he is close " "to. States he had a \"very good childhood.\"  Trauma Abuse HX:   None reported   Relationship / Sexuality:   In a relationship with his new girlfriend. Still lives with his ex girlfriend and this causes relationship issues   Substance Use/ Abuse:   States he drinks alcohol about once a week on the weekends. Denies any other current or past substance abuse.  Chemical Dependency Treatment HX:   None reported   Legal Issues:   None reported   Significant Life Events:   None reported   Strengths:   Supportive friends and family, Accepting of follow up services   Challenges /Limitation:   Alcohol use, Depression   Patient Support Contact (Include name, relationship, number, and summary of conversation):  Pt has a release signed for his mom Marisa Ordonez [phone] or [phone]. Spoke with pt's mother Marisa and confirmed that she will remove guns from pt's home and lock them in a gun safe. Talked with Marisa about discharge plans and follow up apts. Marisa asks to speak with pt's nurse regarding medications.   Interventions:      Medical/Dental Care- PCP- [facility]- Bernard Corona CD Evaluation/Rule 25/Aftercare- Recommended- Pt not interested at this time    Medication Management- PCP to manage     Individual Therapy- Would like a [place] [facility]    Insurance Coverage- [facility]     Suicide Risk Assessment-  Patient was admitted with SI-  He had a loaded gun and was threatening to kill self. Denies any past suicide attempts. Denies SI today- States he is doing \"better\" and is not as upset with himself.     High Risk Safety Plan- Talk to supports; Call crisis lines; Go to local ER if feeling suicidal.      "

## 2018-03-27 VITALS
TEMPERATURE: 97.6 F | HEART RATE: 80 BPM | OXYGEN SATURATION: 96 % | RESPIRATION RATE: 16 BRPM | SYSTOLIC BLOOD PRESSURE: 144 MMHG | DIASTOLIC BLOOD PRESSURE: 73 MMHG

## 2018-03-27 PROCEDURE — 25000132 ZZH RX MED GY IP 250 OP 250 PS 637: Performed by: NURSE PRACTITIONER

## 2018-03-27 RX ADMIN — MULTIPLE VITAMINS W/ MINERALS TAB 1 TABLET: TAB at 08:14

## 2018-03-27 RX ADMIN — FOLIC ACID 1 MG: 1 TABLET ORAL at 08:15

## 2018-03-27 RX ADMIN — Medication 100 MG: at 08:14

## 2018-03-27 RX ADMIN — FLUTICASONE FUROATE AND VILANTEROL TRIFENATATE 1 PUFF: 200; 25 POWDER RESPIRATORY (INHALATION) at 08:21

## 2018-03-27 RX ADMIN — BUPROPION HYDROCHLORIDE 150 MG: 150 TABLET, FILM COATED, EXTENDED RELEASE ORAL at 08:15

## 2018-03-27 ASSESSMENT — ACTIVITIES OF DAILY LIVING (ADL)
ORAL_HYGIENE: INDEPENDENT
GROOMING: INDEPENDENT
LAUNDRY: UNABLE TO COMPLETE
DRESS: SCRUBS (BEHAVIORAL HEALTH);INDEPENDENT

## 2018-03-27 NOTE — PLAN OF CARE
Face to face end of shift report received from Antonino Mancera RN. Rounding completed. Patient observed.     Mile Koehler  3/27/2018  7:32 AM

## 2018-03-27 NOTE — PLAN OF CARE
Problem: Patient Care Overview  Goal: Individualization & Mutuality  Patient will complete ADLs without prompts.    Patient will be in agreeance with treatment team recommendations.   Patient will attend at least 50% of groups.   Patient will report having no suicidal ideation by discharge.      Discharge Note    Patient Discharged to home on 3/27/2018 9:08 AM via Private Car accompanied by LPN floor staff.     Patient informed of discharge instructions in AVS. patient verbalizes understanding and denies having any questions pertaining to AVS. Patient stable at time of discharge. Patient denies SI, HI, and thoughts of self harm at time of discharge. All personal belongings returned to patient. Discharge prescriptions sent to [facility] via verbal communication. Psych evaluation, history and physical, AVS, and discharge summary faxed to next level of care- Patient discharge home. Pt had no current questions regarding plan of care. Pt does report owning guns at home. He states they are in locked cabinet. He denies suicidal ideation.     Mile Koehler  3/27/2018  9:08 AM            Problem: Suicide Risk (Adult)  Goal: Strength-Based Wellness/Recovery  Patient will report an absence of suicidal ideation by discharge.   Patient will contract for safety while on the unit.    Outcome: Adequate for Discharge Date Met: 03/27/18  Pt denies having suicidal ideation. Pt contracts for safety. Pt looking forward to going home.

## 2018-03-27 NOTE — PLAN OF CARE
Problem: Patient Care Overview  Goal: Individualization & Mutuality  Patient will complete ADLs without prompts.    Patient will be in agreeance with treatment team recommendations.   Patient will attend at least 50% of groups.   Patient will report having no suicidal ideation by discharge.      2340--in bed with eyes closed and breathing regular. 0524--still in bed with eyes closed and breathing regular. 0700--pleasant, polite, cooperative. Has no shakes, tremors, diaphoresis, nausea and is upbeat . Good eye contact.

## 2018-03-27 NOTE — DISCHARGE INSTRUCTIONS
Behavioral Discharge Planning and Instructions    Summary: Patient was admitted with SI.     Main Diagnosis: Major Depressive episode-moderate    Major Treatments, Procedures and Findings: Stabilize with medications, connect with community programs.    Symptoms to Report: feeling more aggressive, increased confusion, losing more sleep, mood getting worse or thoughts of suicide    Lifestyle Adjustment: Take all medications as prescribed, meet with doctor/ medication provider, out patient therapist, , and [facility] worker as scheduled. Abstain from alcohol or any unprescribed drugs.    Psychiatry Follow-up:     [facility]  PCP- Yeimy Corona- April 10th at 8:15am and June 1st at 8:15am   [address] EMILY Garcia [facility] [address]  Phone- [phone]  Fax- [phone]    [facility]  DA/Therapy- Lisa Monzon- April 3rd at 9am, Please arrive 15 min prior to apt to complete paperwork   [address] [place] [address] [phone]  Fax: [phone]    [facility]   [address] [place]  [address]  Phone - [phone]  Fax - [phone]    Chemical Health assessment resources:    [facility]  [address] [place]  [address]  Phone - [phone]  Fax - [phone]    [facility]  [address], [place]  [address]  Phone - [phone]  Fax - [phone]    [facility]  [address] [place] [address]  office :[phone]  fax: [phone]      Resources:   Crisis Intervention: 130.510.9368 or 362-654-5661 (TTY: 287.471.1347).  Call anytime for help.  [place] on Mental Illness (www.[place].yonatan.org): 147.984.1857 or 021-987-0840.  Alcoholics Anonymous (www.alcoholics-anonymous.org): Check your phone book for your local chapter.  Suicide Awareness Voices of Education (SAVE) (www.save.org): 997-606-XKYP (1684)  National Suicide Prevention Line (www.mentalhealthmn.org): 842-292-JRUM (1143)  Mental Health Consumer/Survivor Network of [place]  (www.mhcsn.net): 312-086-9384 or 242-138-6054  Mental Health Association of [place] (www.mentalhealth.org): 281.406.4554 or 776-832-9602    General Medication Instructions:   See your medication sheet(s) for instructions.   Take all medicines as directed.  Make no changes unless your doctor suggests them.   Go to all your doctor visits.  Be sure to have all your required lab tests. This way, your medicines can be refilled on time.  Do not use any drugs not prescribed by your doctor.  Avoid alcohol.    [place] Area:  [facility], Crisis stabilization [facility]- [phone]  [facility] Crisis Line: 4-986-212-2686  Advocates For Family Peace: 077-8439  [facility]: 811.856.2489 or 1-134.389.2745  [facility] Battered Women's Program: 4-746-853-6466 Ext: 279       Calls answered Mon-Fri-8:00 am--4:30 pm    [place]:  [facility]ates for Family Peace: 9-383-505-0692  [facility] first call for help: 8-714-412-3524  [facility]:  (783) 435-8697      [place] Area:  Warm Line: 1-528.151.6146       Calls answered Tuesday--Saturday 4:00 pm--10:00 pm  [facility] Crisis Line - [phone]  [place] [facility] 437-657-6619    [place] Statewide:  [place] Crisis and Referral Services: 1-261-678-0972  National Suicide Prevention Lifeline: 8-473-331-TALK (6381)   - ndj9dgyk- Text  Life  to 00445  First Call for Help: 2-1-1  [facility] Helpline- 6-138-XOTC-HELP

## 2018-03-27 NOTE — PLAN OF CARE
Problem: Patient Care Overview  Goal: Discharge Needs Assessment  Outcome: Adequate for Discharge Date Met: 03/27/18  Pt is discharging at the recommendation of the treatment team. Pt is discharging to home transported by friend. Pt denies having any thoughts of hurting themself or anyone else. Pt denies anxiety or depression. Pt has follow up with [facility] and [facility]. Discharge instructions, including; demographic sheet, psychiatric evaluation, discharge summary, and AVS were faxed to these next level of care providers.

## 2018-03-27 NOTE — PLAN OF CARE
Face to face end of shift report received from Karen HALL RN. Rounding completed. Patient observed.     Antonino Mancera  3/27/2018  12:19 AM

## 2018-04-03 ENCOUNTER — TELEPHONE (OUTPATIENT)
Dept: FAMILY MEDICINE | Facility: OTHER | Age: 28
End: 2018-04-03

## 2018-04-04 ENCOUNTER — TELEPHONE (OUTPATIENT)
Dept: FAMILY MEDICINE | Facility: OTHER | Age: 28
End: 2018-04-04

## 2018-04-20 ENCOUNTER — RADIANT APPOINTMENT (OUTPATIENT)
Dept: GENERAL RADIOLOGY | Facility: OTHER | Age: 28
End: 2018-04-20
Attending: NURSE PRACTITIONER
Payer: COMMERCIAL

## 2018-04-20 ENCOUNTER — OFFICE VISIT (OUTPATIENT)
Dept: FAMILY MEDICINE | Facility: OTHER | Age: 28
End: 2018-04-20
Attending: NURSE PRACTITIONER
Payer: COMMERCIAL

## 2018-04-20 VITALS
BODY MASS INDEX: 33.38 KG/M2 | HEART RATE: 88 BPM | SYSTOLIC BLOOD PRESSURE: 130 MMHG | WEIGHT: 260 LBS | OXYGEN SATURATION: 97 % | DIASTOLIC BLOOD PRESSURE: 70 MMHG | TEMPERATURE: 99 F | RESPIRATION RATE: 14 BRPM

## 2018-04-20 DIAGNOSIS — R06.2 WHEEZING: ICD-10-CM

## 2018-04-20 DIAGNOSIS — R68.89 FLU-LIKE SYMPTOMS: Primary | ICD-10-CM

## 2018-04-20 DIAGNOSIS — J10.1 INFLUENZA A: ICD-10-CM

## 2018-04-20 DIAGNOSIS — J01.00 ACUTE MAXILLARY SINUSITIS, RECURRENCE NOT SPECIFIED: ICD-10-CM

## 2018-04-20 DIAGNOSIS — F33.1 MAJOR DEPRESSIVE DISORDER, RECURRENT EPISODE, MODERATE (H): ICD-10-CM

## 2018-04-20 DIAGNOSIS — J20.9 ACUTE BRONCHITIS, UNSPECIFIED ORGANISM: ICD-10-CM

## 2018-04-20 LAB
FLUAV+FLUBV AG SPEC QL: NEGATIVE
FLUAV+FLUBV AG SPEC QL: POSITIVE
SPECIMEN SOURCE: ABNORMAL

## 2018-04-20 PROCEDURE — 71046 X-RAY EXAM CHEST 2 VIEWS: CPT | Mod: TC

## 2018-04-20 PROCEDURE — 99214 OFFICE O/P EST MOD 30 MIN: CPT | Performed by: NURSE PRACTITIONER

## 2018-04-20 PROCEDURE — 87804 INFLUENZA ASSAY W/OPTIC: CPT | Mod: 59 | Performed by: NURSE PRACTITIONER

## 2018-04-20 RX ORDER — ALBUTEROL SULFATE 0.83 MG/ML
1 SOLUTION RESPIRATORY (INHALATION) EVERY 6 HOURS PRN
Qty: 25 VIAL | Refills: 1 | Status: SHIPPED | OUTPATIENT
Start: 2018-04-20 | End: 2018-08-23

## 2018-04-20 RX ORDER — AZITHROMYCIN 250 MG/1
TABLET, FILM COATED ORAL
Qty: 6 TABLET | Refills: 0 | Status: SHIPPED | OUTPATIENT
Start: 2018-04-20 | End: 2018-04-20

## 2018-04-20 RX ORDER — OSELTAMIVIR PHOSPHATE 75 MG/1
75 CAPSULE ORAL 2 TIMES DAILY
Qty: 10 CAPSULE | Refills: 0 | Status: SHIPPED | OUTPATIENT
Start: 2018-04-20 | End: 2018-06-06

## 2018-04-20 RX ORDER — AZITHROMYCIN 250 MG/1
TABLET, FILM COATED ORAL
Qty: 11 TABLET | Refills: 0 | Status: SHIPPED | OUTPATIENT
Start: 2018-04-20 | End: 2018-06-06

## 2018-04-20 RX ORDER — CEFTRIAXONE 1 G/1
2000 INJECTION, POWDER, FOR SOLUTION INTRAMUSCULAR; INTRAVENOUS ONCE
Qty: 20 ML | Refills: 0 | OUTPATIENT
Start: 2018-04-20 | End: 2018-04-20

## 2018-04-20 RX ORDER — BUPROPION HYDROCHLORIDE 150 MG/1
150 TABLET ORAL DAILY
Qty: 30 TABLET | Refills: 5 | Status: SHIPPED | OUTPATIENT
Start: 2018-04-20 | End: 2018-08-28

## 2018-04-20 ASSESSMENT — ANXIETY QUESTIONNAIRES
6. BECOMING EASILY ANNOYED OR IRRITABLE: NOT AT ALL
5. BEING SO RESTLESS THAT IT IS HARD TO SIT STILL: NOT AT ALL
2. NOT BEING ABLE TO STOP OR CONTROL WORRYING: NOT AT ALL
GAD7 TOTAL SCORE: 0
1. FEELING NERVOUS, ANXIOUS, OR ON EDGE: NOT AT ALL
7. FEELING AFRAID AS IF SOMETHING AWFUL MIGHT HAPPEN: NOT AT ALL
4. TROUBLE RELAXING: NOT AT ALL
3. WORRYING TOO MUCH ABOUT DIFFERENT THINGS: NOT AT ALL
IF YOU CHECKED OFF ANY PROBLEMS ON THIS QUESTIONNAIRE, HOW DIFFICULT HAVE THESE PROBLEMS MADE IT FOR YOU TO DO YOUR WORK, TAKE CARE OF THINGS AT HOME, OR GET ALONG WITH OTHER PEOPLE: NOT DIFFICULT AT ALL

## 2018-04-20 ASSESSMENT — PAIN SCALES - GENERAL: PAINLEVEL: SEVERE PAIN (7)

## 2018-04-20 NOTE — MR AVS SNAPSHOT
After Visit Summary   4/20/2018    Kannan Ordonez    MRN: [id number]           Patient Information     Date Of Birth          1990        Visit Information        Provider Department      4/20/2018 9:30 AM Yeimy Corona NP [facility]        Today's Diagnoses     Flu-like symptoms    -  1    Influenza A        Wheezing        Acute maxillary sinusitis, recurrence not specified        Acute bronchitis, unspecified organism        Major depressive disorder, recurrent episode, moderate (H)          Care Instructions    Results for orders placed or performed in visit on 04/20/18 (from the past 24 hour(s))   Influenza A/B antigen   Result Value Ref Range    Influenza A/B Agn Specimen Nasal     Influenza A Positive (A) NEG^Negative    Influenza B Negative NEG^Negative       1. Influenza A  - oseltamivir (TAMIFLU) 75 MG capsule; Take 1 capsule (75 mg) by mouth 2 times daily  Dispense: 10 capsule; Refill: 0    2. Flu-like symptoms  - Influenza A/B antigen - A positive    3. Wheezing  - XR CHEST 2 VW (Clinic Performed); Future  - albuterol (2.5 MG/3ML) 0.083% neb solution; Take 1 vial (2.5 mg) by nebulization every 6 hours as needed for shortness of breath / dyspnea or wheezing  Dispense: 25 vial; Refill: 1  - Inhaler as needed    4. Major depressive disorder, recurrent episode, moderate (H)  - buPROPion (WELLBUTRIN XL) 150 MG 24 hr tablet; Take 1 tablet (150 mg) by mouth daily  Dispense: 30 tablet; Refill: 5  - Counseling as established    5. Acute maxillary sinusitis, recurrence not specified  - cefTRIAXone (ROCEPHIN) 1 GM vial; Inject 2 g (2,000 mg) into the muscle once for 1 dose  Dispense: 20 mL; Refill: 0  - azithromycin (ZITHROMAX) 250 MG tablet; Two tablets first day, then one tablet daily for four days.  Dispense: 6 tablet; Refill: 0    6. Acute bronchitis, unspecified organism  - cefTRIAXone (ROCEPHIN) 1 GM vial; Inject 2 g (2,000 mg) into the muscle once for 1 dose  Dispense:  20 mL; Refill: 0  - azithromycin (ZITHROMAX) 250 MG tablet; Two tablets first day, then one tablet daily for four days.  Dispense: 6 tablet; Refill: 0  - albuterol (2.5 MG/3ML) 0.083% neb solution; Take 1 vial (2.5 mg) by nebulization every 6 hours as needed for shortness of breath / dyspnea or wheezing  Dispense: 25 vial; Refill: 1        Fluids  Rest  Humidity at home - add bacteriostatic solution to humidifier  OTC Mucinex liquid cough and cold  Add Zicam or other zinc daily until you feel better  Treat temp at home  Please go to the ER or UC if your symptoms worsen   Please return to clinic if unimproved        Yeimy Corona NP  [facility]            Follow-ups after your visit        Your next 10 appointments already scheduled     Jun 01, 2018  8:30 AM CDT   (Arrive by 8:15 AM)   SHORT with Yeimy Corona NP   [facility] )    [address]  [facility] [address]   [phone]              Who to contact     If you have questions or need follow up information about today's clinic visit or your schedule please contact [facility] directly at [phone].  Normal or non-critical lab and imaging results will be communicated to you by MyChart, letter or phone within 4 business days after the clinic has received the results. If you do not hear from us within 7 days, please contact the clinic through MyChart or phone. If you have a critical or abnormal lab result, we will notify you by phone as soon as possible.  Submit refill requests through [facility] or call your pharmacy and they will forward the refill request to us. Please allow 3 business days for your refill to be completed.          Additional Information About Your Visit        [facility] Information     [facility] [employer] [facility] lets you send messages to your doctor, view your test results, renew your prescriptions, schedule appointments and more. To sign up, go to [email or web address] .  "Click on \"Log in\" on the left side of the screen, which will take you to the Welcome page. Then click on \"Sign up Now\" on the right side of the page.     You will be asked to enter the access code listed below, as well as some personal information. Please follow the directions to create your username and password.     Your access code is: [email or web address]  Expires: 2018  9:46 AM     Your access code will  in 90 days. If you need help or a new code, please call your [facility] clinic or [phone].        Care EveryWhere ID     This is your Care EveryWhere ID. This could be used by other organizations to access your [facility] medical records  [id number]        Your Vitals Were     Pulse Temperature Respirations Pulse Oximetry BMI (Body Mass Index)       88 99  F (37.2  C) 14 97% 33.38 kg/m2        Blood Pressure from Last 3 Encounters:   18 130/70   18 144/73   18 138/78    Weight from Last 3 Encounters:   18 260 lb (117.9 kg)   18 257 lb (116.6 kg)   18 258 lb 9.6 oz (117.3 kg)              We Performed the Following     Influenza A/B antigen          Today's Medication Changes          These changes are accurate as of 18 10:06 AM.  If you have any questions, ask your nurse or doctor.               Start taking these medicines.        Dose/Directions    azithromycin 250 MG tablet   Commonly known as:  ZITHROMAX   Used for:  Acute maxillary sinusitis, recurrence not specified, Acute bronchitis, unspecified organism   Started by:  Yeimy Corona NP        Two tablets first day, then one tablet daily for 9 days.   Quantity:  11 tablet   Refills:  0       cefTRIAXone 1 GM vial   Commonly known as:  ROCEPHIN   Used for:  Acute maxillary sinusitis, recurrence not specified, Acute bronchitis, unspecified organism   Started by:  Yeimy Corona NP        Dose:  2000 mg   Inject 2 g (2,000 mg) into the muscle once for 1 dose   Quantity:  20 mL   Refills:  0       oseltamivir 75 MG " capsule   Commonly known as:  TAMIFLU   Used for:  Influenza A   Started by:  Yeimy Corona NP        Dose:  75 mg   Take 1 capsule (75 mg) by mouth 2 times daily   Quantity:  10 capsule   Refills:  0         These medicines have changed or have updated prescriptions.        Dose/Directions    * albuterol 108 (90 Base) MCG/ACT Inhaler   Commonly known as:  PROAIR HFA/PROVENTIL HFA/VENTOLIN HFA   This may have changed:  Another medication with the same name was added. Make sure you understand how and when to take each.   Used for:  Mild intermittent asthma without complication   Changed by:  Yeimy Corona NP        Dose:  2 puff   Inhale 2 puffs into the lungs every 6 hours as needed for shortness of breath / dyspnea   Quantity:  1 Inhaler   Refills:  1       * albuterol (2.5 MG/3ML) 0.083% neb solution   This may have changed:  You were already taking a medication with the same name, and this prescription was added. Make sure you understand how and when to take each.   Used for:  Wheezing, Acute bronchitis, unspecified organism   Changed by:  Yeimy Corona NP        Dose:  1 vial   Take 1 vial (2.5 mg) by nebulization every 6 hours as needed for shortness of breath / dyspnea or wheezing   Quantity:  25 vial   Refills:  1       * Notice:  This list has 2 medication(s) that are the same as other medications prescribed for you. Read the directions carefully, and ask your doctor or other care provider to review them with you.         Where to get your medicines      These medications were sent to [facility] [address] [place] [address] [address] [place] [address] [facility] [address]  [address], [place] [address]     Phone:  [phone]     albuterol (2.5 MG/3ML) 0.083% neb solution    azithromycin 250 MG tablet    buPROPion 150 MG 24 hr tablet    oseltamivir 75 MG capsule         Some of these will need a paper prescription and others can be bought over the counter.  Ask your nurse if you have  questions.     You don't need a prescription for these medications     cefTRIAXone 1 GM vial                Primary Care Provider Office Phone # Fax #    Yeimy Corona [phone] [address]  [place] [address]        Equal Access to Services     ZONIA ARAGON : Hadii moose varghese hadrogerioo Soomaali, waaxda luqadaha, qaybta kaalmada adeegyada, waxezio woodsin hayindyn logan metcalfnolbertogeri staley. So [facility] [phone].    ATENCIÓN: Si habla español, tiene a underwood disposición servicios gratuitos de asistencia lingüística. Llame al [phone].    We comply with applicable federal civil rights laws and [place] laws. We do not discriminate on the basis of race, color, national origin, age, disability, sex, sexual orientation, or gender identity.            Thank you!     Thank you for choosing [facility]  for your care. Our goal is always to provide you with excellent care. Hearing back from our patients is one way we can continue to improve our services. Please take a few minutes to complete the written survey that you may receive in the mail after your visit with us. Thank you!             Your Updated Medication List - Protect others around you: Learn how to safely use, store and throw away your medicines at www.disposemymeds.org.          This list is accurate as of 4/20/18 10:06 AM.  Always use your most recent med list.                   Brand Name Dispense Instructions for use Diagnosis    * albuterol 108 (90 Base) MCG/ACT Inhaler    PROAIR HFA/PROVENTIL HFA/VENTOLIN HFA    1 Inhaler    Inhale 2 puffs into the lungs every 6 hours as needed for shortness of breath / dyspnea    Mild intermittent asthma without complication       * albuterol (2.5 MG/3ML) 0.083% neb solution     25 vial    Take 1 vial (2.5 mg) by nebulization every 6 hours as needed for shortness of breath / dyspnea or wheezing    Wheezing, Acute bronchitis, unspecified organism       azithromycin 250 MG tablet     ZITHROMAX    11 tablet    Two tablets first day, then one tablet daily for 9 days.    Acute maxillary sinusitis, recurrence not specified, Acute bronchitis, unspecified organism       budesonide-formoterol 160-4.5 MCG/ACT Inhaler    SYMBICORT    3 Inhaler    Inhale 2 puffs into the lungs 2 times daily    Mild persistent asthma without complication       buPROPion 150 MG 24 hr tablet    WELLBUTRIN XL    30 tablet    Take 1 tablet (150 mg) by mouth daily    Major depressive disorder, recurrent episode, moderate (H)       cefTRIAXone 1 GM vial    ROCEPHIN    20 mL    Inject 2 g (2,000 mg) into the muscle once for 1 dose    Acute maxillary sinusitis, recurrence not specified, Acute bronchitis, unspecified organism       oseltamivir 75 MG capsule    TAMIFLU    10 capsule    Take 1 capsule (75 mg) by mouth 2 times daily    Influenza A       * Notice:  This list has 2 medication(s) that are the same as other medications prescribed for you. Read the directions carefully, and ask your doctor or other care provider to review them with you.

## 2018-04-20 NOTE — LETTER
[facility]  [address]  [place] [facility] [address]  Phone: [phone]    April 20, 2018        Kannan Ordonez  [address]  [facility] [address]          To whom it may concern:    RE: Kannan Ordonez    Please excuse from work 4/20/18 due to illness    Please contact me for questions or concerns.      Sincerely,        Yeimy Corona NP

## 2018-04-20 NOTE — NURSING NOTE
"Chief Complaint   Patient presents with     URI       Initial /70 (BP Location: Right arm, Patient Position: Sitting, Cuff Size: Adult Large)  Pulse 88  Temp 99  F (37.2  C)  Resp 14  Wt 260 lb (117.9 kg)  SpO2 97%  BMI 33.38 kg/m2 Estimated body mass index is 33.38 kg/(m^2) as calculated from the following:    Height as of 3/19/18: 6' 2\" (1.88 m).    Weight as of this encounter: 260 lb (117.9 kg).  Medication Reconciliation: complete     Heidy Alvarez      "

## 2018-04-20 NOTE — PROGRESS NOTES
SUBJECTIVE:   Kannan Ordonez is a 28 year old male who presents to clinic today for the following health issues:      Acute Illness   Acute illness concerns: fever, cough, body aches  Onset: 3 days    Fever: YES    Chills/Sweats: YES    Headache (location?): YES    Sinus Pressure:YES    Conjunctivitis:  no    Ear Pain: no    Rhinorrhea: YES    Congestion: YES    Sore Throat: no      Cough: YES - productive at times    Wheeze: YES    Decreased Appetite: no     Nausea: no     Vomiting: no     Diarrhea:  no     Dysuria/Freq.: no     Fatigue/Achiness: YES    Sick/Strep Exposure: no      Therapies Tried and outcome: tylenol, helps      Depression and Anxiety Follow-Up    Status since last visit: Improved     Other associated symptoms:None    Complicating factors:     Significant life event: No     Current substance abuse: None        PHQ-9 3/2/2018 3/19/2018 4/20/2018   Total Score 7 15 0   Q9: Suicide Ideation Not at all Not at all Not at all     ÁLVARO-7 SCORE 3/2/2018 3/19/2018 4/20/2018   Total Score 12 17 0     In the past two weeks have you had thoughts of suicide or self-harm?  No.    Do you have concerns about your personal safety or the safety of others?   No       PHQ-9  English  PHQ-9   Any Language  ÁLVARO-7  Suicide Assessment Five-step Evaluation and Treatment (SAFE-T)      Problem list and histories reviewed & adjusted, as indicated.  Additional history: as documented      Patient Active Problem List   Diagnosis     ACP (advance care planning)     Anxiety     Recurrent major depressive disorder (H)     Vitamin D deficiency     Heart murmur     Reactive airway disease that is not asthma     Depression with suicidal ideation     Past Surgical History:   Procedure Laterality Date     ORTHOPEDIC SURGERY  2015    r shoulder teat bone spur     ORTHOPEDIC SURGERY  2-2016    AC joint right     ORTHOPEDIC SURGERY  2016    Rt labrial tear     ORTHOPEDIC SURGERY  06/2017    revision decompression subpectoral  delfino flores        Social History   Substance Use Topics     Smoking status: Never Smoker     Smokeless tobacco: Current User     Types: Chew     Alcohol use Yes      Comment: occ     Family History   Problem Relation Age of Onset     DIABETES Mother      Hypertension Mother      Hyperlipidemia Mother      Coronary Artery Disease Father      Hyperlipidemia Father      Hypertension Father      Thyroid Disease No family hx of          Current Outpatient Prescriptions   Medication Sig Dispense Refill     albuterol (PROAIR HFA/PROVENTIL HFA/VENTOLIN HFA) 108 (90 BASE) MCG/ACT Inhaler Inhale 2 puffs into the lungs every 6 hours as needed for shortness of breath / dyspnea 1 Inhaler 1     budesonide-formoterol (SYMBICORT) 160-4.5 MCG/ACT Inhaler Inhale 2 puffs into the lungs 2 times daily 3 Inhaler 1     buPROPion (WELLBUTRIN XL) 150 MG 24 hr tablet Take 1 tablet (150 mg) by mouth daily 30 tablet 0     Allergies   Allergen Reactions     Morphine Sulfate Rash     Cats Other (See Comments)     Other reaction(s): Eye Irritation, Sneezing     Dogs Other (See Comments)     Other reaction(s): Eye Irritation, Sneezing     Morphine Hives     Recent Labs   Lab Test  03/02/18   0902 11/17/17  10/24/17   1439  06/08/17   0922  05/15/17   1630  02/13/17   1020  11/07/16   1403   02/02/16   1519   08/28/15   1449   A1C   --    --   5.1   --    --    --    --    --    --    --    --    LDL   --    --    --    --   125*  107*  103*   < >   --    < >   --    HDL   --    --    --    --   62  46  47   < >   --    < >   --    TRIG   --    --    --    --   136  147  167*   < >   --    < >   --    ALT   --    --    --   82*   --    --    --    --   78*   --   51   CR   --   0.98  0.95  0.99  0.87  0.86  1.01   < >  1.19   < >  0.84   GFRESTIMATED   --    --   >90  >90  Non African American GFR Calc    >90  Non  GFR Calc    >90  Non  GFR Calc    89   < >  74   < >  >90  Non  GFR Calc      GFRESTBLACK   --    --   >90  >90  African American GFR Calc    >90   GFR Calc    >90   GFR Calc    >90   GFR Calc     < >  89   < >  >90   GFR Calc     POTASSIUM   --   3.7  3.9  4.3  3.7  4.6  3.9   < >  3.8   < >  4.1   TSH  2.58   --   1.94   --   3.08  1.92  2.86   < >  4.41*   < >   --     < > = values in this interval not displayed.      BP Readings from Last 3 Encounters:   04/20/18 130/70   03/27/18 144/73   03/19/18 138/78    Wt Readings from Last 3 Encounters:   04/20/18 260 lb (117.9 kg)   03/19/18 257 lb (116.6 kg)   03/02/18 258 lb 9.6 oz (117.3 kg)                  Labs reviewed in EPIC    Reviewed and updated as needed this visit by clinical staff  Tobacco  Allergies  Meds  Med Hx  Surg Hx  Fam Hx  Soc Hx      Reviewed and updated as needed this visit by Provider         ROS:  Constitutional, HEENT, cardiovascular, pulmonary, GI, , musculoskeletal, neuro, skin, endocrine and psych systems are negative, except as otherwise noted.    OBJECTIVE:     /70 (BP Location: Right arm, Patient Position: Sitting, Cuff Size: Adult Large)  Pulse 88  Temp 99  F (37.2  C)  Resp 14  Wt 260 lb (117.9 kg)  SpO2 97%  BMI 33.38 kg/m2  Body mass index is 33.38 kg/(m^2).       GENERAL: healthy, alert and no distress  EYES: Eyes grossly normal to inspection, PERRL and conjunctivae and sclerae normal  HENT: tonsillar erythema and sinuses: maxillary, frontal tenderness on both sides, PND, yellow and thick  NECK: no adenopathy, no asymmetry, masses, or scars and thyroid normal to palpation  RESP: diffuse wheezes, rales, deep cough  CV: regular rate and rhythm, normal S1 S2, no S3 or S4, no murmur, click or rub, no peripheral edema and peripheral pulses strong  ABDOMEN: soft, nontender, no hepatosplenomegaly, no masses and bowel sounds normal  MS: body aches    Results for orders placed or performed in visit on 04/20/18 (from the past 24  hour(s))   Influenza A/B antigen   Result Value Ref Range    Influenza A/B Agn Specimen Nasal     Influenza A Positive (A) NEG^Negative    Influenza B Negative NEG^Negative         Visit time:   Over 30 minutes are spent with the patient.   Greater than 50 % of our visit time was spent face to face and included education and counseling regarding current conditions,  medication management, and plan of care.  We discussed the importance of medication compliance.  Visit Content:   Lab testing discussed and reviewed  Any medication adjustment has been reviewed  Health Maintenance - updated as appropriate.  Record review completed        ASSESSMENT/PLAN:     Depression; recurrent episode-- Full remission   Associated with the following complications:    None   Plan:  No changes in the patient's current treatment plan        1. Influenza A  - oseltamivir (TAMIFLU) 75 MG capsule; Take 1 capsule (75 mg) by mouth 2 times daily  Dispense: 10 capsule; Refill: 0    2. Flu-like symptoms  - Influenza A/B antigen - A positive    3. Wheezing  - XR CHEST 2 VW (Clinic Performed); Future  - albuterol (2.5 MG/3ML) 0.083% neb solution; Take 1 vial (2.5 mg) by nebulization every 6 hours as needed for shortness of breath / dyspnea or wheezing  Dispense: 25 vial; Refill: 1  - Inhaler as needed    4. Major depressive disorder, recurrent episode, moderate (H)  - buPROPion (WELLBUTRIN XL) 150 MG 24 hr tablet; Take 1 tablet (150 mg) by mouth daily  Dispense: 30 tablet; Refill: 5  - Counseling as established    5. Acute maxillary sinusitis, recurrence not specified  - cefTRIAXone (ROCEPHIN) 1 GM vial; Inject 2 g (2,000 mg) into the muscle once for 1 dose  Dispense: 20 mL; Refill: 0  - azithromycin (ZITHROMAX) 250 MG tablet; Two tablets first day, then one tablet daily for four days.  Dispense: 6 tablet; Refill: 0    6. Acute bronchitis, unspecified organism  - cefTRIAXone (ROCEPHIN) 1 GM vial; Inject 2 g (2,000 mg) into the muscle once for 1  dose  Dispense: 20 mL; Refill: 0  - azithromycin (ZITHROMAX) 250 MG tablet; Two tablets first day, then one tablet daily for four days.  Dispense: 6 tablet; Refill: 0  - albuterol (2.5 MG/3ML) 0.083% neb solution; Take 1 vial (2.5 mg) by nebulization every 6 hours as needed for shortness of breath / dyspnea or wheezing  Dispense: 25 vial; Refill: 1        Fluids  Rest  Humidity at home - add bacteriostatic solution to humidifier  OTC Mucinex liquid cough and cold  Add Zicam or other zinc daily until you feel better  Treat temp at home  Please go to the ER or UC if your symptoms worsen   Please return to clinic if unimproved        Yeimy Corona NP  [facility]

## 2018-04-20 NOTE — NURSING NOTE
The following medication was given:     MEDICATION: Rocephin 1gram  ROUTE: IM  SITE: LUQ - Gluteus and RUQ - Gluteus  DOSE: 2 gram  LOT #: [id number]  :  Tanner  EXPIRATION DATE:  8/01/20  NDC#: 8437-3325-13  Annalise Freeman

## 2018-04-20 NOTE — PATIENT INSTRUCTIONS
Results for orders placed or performed in visit on 04/20/18 (from the past 24 hour(s))   Influenza A/B antigen   Result Value Ref Range    Influenza A/B Agn Specimen Nasal     Influenza A Positive (A) NEG^Negative    Influenza B Negative NEG^Negative       1. Influenza A  - oseltamivir (TAMIFLU) 75 MG capsule; Take 1 capsule (75 mg) by mouth 2 times daily  Dispense: 10 capsule; Refill: 0    2. Flu-like symptoms  - Influenza A/B antigen - A positive    3. Wheezing  - XR CHEST 2 VW (Clinic Performed); Future  - albuterol (2.5 MG/3ML) 0.083% neb solution; Take 1 vial (2.5 mg) by nebulization every 6 hours as needed for shortness of breath / dyspnea or wheezing  Dispense: 25 vial; Refill: 1  - Inhaler as needed    4. Major depressive disorder, recurrent episode, moderate (H)  - buPROPion (WELLBUTRIN XL) 150 MG 24 hr tablet; Take 1 tablet (150 mg) by mouth daily  Dispense: 30 tablet; Refill: 5  - Counseling as established    5. Acute maxillary sinusitis, recurrence not specified  - cefTRIAXone (ROCEPHIN) 1 GM vial; Inject 2 g (2,000 mg) into the muscle once for 1 dose  Dispense: 20 mL; Refill: 0  - azithromycin (ZITHROMAX) 250 MG tablet; Two tablets first day, then one tablet daily for four days.  Dispense: 6 tablet; Refill: 0    6. Acute bronchitis, unspecified organism  - cefTRIAXone (ROCEPHIN) 1 GM vial; Inject 2 g (2,000 mg) into the muscle once for 1 dose  Dispense: 20 mL; Refill: 0  - azithromycin (ZITHROMAX) 250 MG tablet; Two tablets first day, then one tablet daily for four days.  Dispense: 6 tablet; Refill: 0  - albuterol (2.5 MG/3ML) 0.083% neb solution; Take 1 vial (2.5 mg) by nebulization every 6 hours as needed for shortness of breath / dyspnea or wheezing  Dispense: 25 vial; Refill: 1        Fluids  Rest  Humidity at home - add bacteriostatic solution to humidifier  OTC Mucinex liquid cough and cold  Add Zicam or other zinc daily until you feel better  Treat temp at home  Please go to the ER or UC if your  symptoms worsen   Please return to clinic if unimproved        Yeimy Corona NP  [facility]

## 2018-04-21 ASSESSMENT — ANXIETY QUESTIONNAIRES: GAD7 TOTAL SCORE: 0

## 2018-04-21 ASSESSMENT — PATIENT HEALTH QUESTIONNAIRE - PHQ9: SUM OF ALL RESPONSES TO PHQ QUESTIONS 1-9: 0

## 2018-04-23 NOTE — PROGRESS NOTES
Called to check on pt, was in Fri with positive flu, tx'ed,, chest still congested, but over all feels better.  Doing nebs twice a day.  Will call if needs anything further.

## 2018-06-01 PROBLEM — R45.851 DEPRESSION WITH SUICIDAL IDEATION: Status: RESOLVED | Noted: 2018-03-25 | Resolved: 2018-06-01

## 2018-06-01 PROBLEM — F32.A DEPRESSION WITH SUICIDAL IDEATION: Status: RESOLVED | Noted: 2018-03-25 | Resolved: 2018-06-01

## 2018-06-06 ENCOUNTER — APPOINTMENT (OUTPATIENT)
Dept: GENERAL RADIOLOGY | Facility: HOSPITAL | Age: 28
End: 2018-06-06
Attending: FAMILY MEDICINE
Payer: COMMERCIAL

## 2018-06-06 ENCOUNTER — HOSPITAL ENCOUNTER (EMERGENCY)
Facility: HOSPITAL | Age: 28
Discharge: HOME | End: 2018-06-06
Attending: FAMILY MEDICINE | Admitting: FAMILY MEDICINE
Payer: COMMERCIAL

## 2018-06-06 VITALS
SYSTOLIC BLOOD PRESSURE: 110 MMHG | TEMPERATURE: 98.1 F | HEIGHT: 74 IN | RESPIRATION RATE: 16 BRPM | DIASTOLIC BLOOD PRESSURE: 68 MMHG | OXYGEN SATURATION: 97 %

## 2018-06-06 DIAGNOSIS — S93.402A SPRAIN OF LEFT ANKLE, UNSPECIFIED LIGAMENT, INITIAL ENCOUNTER: ICD-10-CM

## 2018-06-06 PROCEDURE — 99284 EMERGENCY DEPT VISIT MOD MDM: CPT | Mod: 25

## 2018-06-06 PROCEDURE — 96372 THER/PROPH/DIAG INJ SC/IM: CPT

## 2018-06-06 PROCEDURE — 73610 X-RAY EXAM OF ANKLE: CPT | Mod: TC,LT

## 2018-06-06 PROCEDURE — 25000128 H RX IP 250 OP 636: Performed by: FAMILY MEDICINE

## 2018-06-06 PROCEDURE — 99283 EMERGENCY DEPT VISIT LOW MDM: CPT | Mod: Z6 | Performed by: FAMILY MEDICINE

## 2018-06-06 RX ORDER — KETOROLAC TROMETHAMINE 30 MG/ML
60 INJECTION, SOLUTION INTRAMUSCULAR; INTRAVENOUS ONCE
Status: COMPLETED | OUTPATIENT
Start: 2018-06-06 | End: 2018-06-06

## 2018-06-06 RX ADMIN — KETOROLAC TROMETHAMINE 60 MG: 30 INJECTION, SOLUTION INTRAMUSCULAR at 22:48

## 2018-06-06 ASSESSMENT — ENCOUNTER SYMPTOMS: JOINT SWELLING: 1

## 2018-06-06 NOTE — ED AVS SNAPSHOT
[facility]    [address]    [place] [address]    Phone:  [phone]                                       Kannan Ordonez   MRN: [id number]    Department:  [facility]   Date of Visit:  6/6/2018           After Visit Summary Signature Page     I have received my discharge instructions, and my questions have been answered. I have discussed any challenges I see with this plan with the nurse or doctor.    ..........................................................................................................................................  Patient/Patient Representative Signature      ..........................................................................................................................................  Patient Representative Print Name and Relationship to Patient    ..................................................               ................................................  Date                                            Time    ..........................................................................................................................................  Reviewed by Signature/Title    ...................................................              ..............................................  Date                                                            Time

## 2018-06-06 NOTE — ED AVS SNAPSHOT
[facility]    [address]    [place] [address]    Phone:  [phone]                                       Kannan Ordonez   MRN: [id number]    Department:  [facility]   Date of Visit:  6/6/2018           Patient Information     Date Of Birth          1990        Your diagnoses for this visit were:     Sprain of left ankle, unspecified ligament, initial encounter        You were seen by Eli Robins MD.      Follow-up Information     Follow up with Yeimy Corona NP In 1 week.    Specialties:  Family Practice, Psychiatry    Contact information:    [address]  [place] [address]  [phone]          Discharge Instructions         Treating Ankle Sprains  Treatment will depend on how bad your sprain is. For a severe sprain, healing may take 3 months or more.  Right after your injury: Use R.I.C.E.    Rest: At first, keep weight off the ankle as much as you can. You may be given crutches to help you walk without putting weight on the ankle.    Ice: Put an ice pack on the ankle for 20 minutes. Remove the pack and wait at least 30 minutes. Repeat for up to 3 days. This helps reduce swelling.    Compression: To reduce swelling and keep the joint stable, you may need to wrap the ankle with an elastic bandage. For more severe sprains, you may need an ankle brace, a boot, or a cast.    Elevation: To reduce swelling, keep your ankle raised above your heart when you sit or lie down.  Medicine  Your healthcare provider may suggest oral nonsteroidal anti-inflammatory medicine (NSAIDs), such as ibuprofen. This relieves the pain and helps reduce swelling. Be sure to take your medicine as directed.  Exercises    After about 2 to 3 weeks, you may be given exercises to strengthen the ligaments and muscles in the ankle. Doing these exercises will help prevent another ankle sprain. Exercises may include standing on your toes and then on your heels and  doing ankle curls.  Ankle curls    Sit on the edge of a sturdy table or lie on your back.    Pull your toes toward you. Then point them away from you. Repeat for 2 to 3 minutes.   Date Last Reviewed: 1/1/2018 2000-2017 [employer]. [address] [place] [address]. All rights reserved. This information is not intended as a substitute for professional medical care. Always follow your healthcare professional's instructions.          Ankle Inversion (Strength)     This exercise is for your right ankle. Switch sides for your left ankle.   1. Tie an elastic exercise band or tubing to the leg of a table. Tie the other end to your right foot.  2. Stand far enough away from the table so that the elastic band or tubing is pulled tight.  3. Point your right foot firmly to the left, pulling on the elastic band or tubing. Hold for 5 seconds.  4. Relax your foot back to a straight position. Repeat this exercise 10 times.  5. Do this exercise 3 times a day, or as instructed.  Date Last Reviewed: 5/1/2016 2000-2017 [employer]. [address] [place] [address]. All rights reserved. This information is not intended as a substitute for professional medical care. Always follow your healthcare professional's instructions.             Review of your medicines      Our records show that you are taking the medicines listed below. If these are incorrect, please call your family doctor or clinic.        Dose / Directions Last dose taken    * albuterol 108 (90 Base) MCG/ACT Inhaler   Commonly known as:  PROAIR HFA/PROVENTIL HFA/VENTOLIN HFA   Dose:  2 puff   Quantity:  1 Inhaler        Inhale 2 puffs into the lungs every 6 hours as needed for shortness of breath / dyspnea   Refills:  1        * albuterol (2.5 MG/3ML) 0.083% neb solution   Dose:  1 vial   Quantity:  25 vial        Take 1 vial (2.5 mg) by nebulization every 6 hours as needed for shortness of breath / dyspnea or wheezing  "  Refills:  1        budesonide-formoterol 160-4.5 MCG/ACT Inhaler   Commonly known as:  SYMBICORT   Dose:  2 puff   Quantity:  3 Inhaler        Inhale 2 puffs into the lungs 2 times daily   Refills:  1        buPROPion 150 MG 24 hr tablet   Commonly known as:  WELLBUTRIN XL   Dose:  150 mg   Quantity:  30 tablet        Take 1 tablet (150 mg) by mouth daily   Refills:  5        * Notice:  This list has 2 medication(s) that are the same as other medications prescribed for you. Read the directions carefully, and ask your doctor or other care provider to review them with you.            Procedures and tests performed during your visit     Ace wraps    Splint    XR Ankle Left G/E 3 Views      Orders Needing Specimen Collection     None      Pending Results     Date and Time Order Name Status Description    2018 2212 XR Ankle Left G/E 3 Views In process             Pending Culture Results     No orders found from 2018 to 2018.            Thank you for choosing [facility]       Thank you for choosing [facility] for your care. Our goal is always to provide you with excellent care. Hearing back from our patients is one way we can continue to improve our services. Please take a few minutes to complete the written survey that you may receive in the mail after you visit with us. Thank you!        [employer] Information     [employer] lets you send messages to your doctor, view your test results, renew your prescriptions, schedule appointments and more. To sign up, go to [email or web address] . Click on \"Log in\" on the left side of the screen, which will take you to the Welcome page. Then click on \"Sign up Now\" on the right side of the page.     You will be asked to enter the access code listed below, as well as some personal information. Please follow the directions to create your username and password.     Your access code is: [email or web address]  Expires: 2018  8:53 AM     Your access code will  in 90 days. If you " need help or a new code, please call your [facility] clinic or [phone].        Care EveryWhere ID     This is your Care EveryWhere ID. This could be used by other organizations to access your [facility] medical records  [id number]        Equal Access to Services     ZONIA ARAGON : Funmilayo Lara, yue ortiz, leigha kaalhailee recinos, leigh staley. So [facility] [phone].    ATENCIÓN: Si habla español, tiene a underwood disposición servicios gratuitos de asistencia lingüística. Llame al [phone].    We comply with applicable federal civil rights laws and [place] laws. We do not discriminate on the basis of race, color, national origin, age, disability, sex, sexual orientation, or gender identity.            After Visit Summary       This is your record. Keep this with you and show to your community pharmacist(s) and doctor(s) at your next visit.

## 2018-06-07 ENCOUNTER — TELEPHONE (OUTPATIENT)
Dept: FAMILY MEDICINE | Facility: OTHER | Age: 28
End: 2018-06-07

## 2018-06-07 NOTE — ED NOTES
"Pt brought in by [facility]. Pt had been playing softball \"rounding first\" and rolled his left ankle. IV was started by EMS and pt received 100 mcg of fentanyl IV, air splint on by EMS   "

## 2018-06-07 NOTE — DISCHARGE INSTRUCTIONS
Treating Ankle Sprains  Treatment will depend on how bad your sprain is. For a severe sprain, healing may take 3 months or more.  Right after your injury: Use R.I.C.E.    Rest: At first, keep weight off the ankle as much as you can. You may be given crutches to help you walk without putting weight on the ankle.    Ice: Put an ice pack on the ankle for 20 minutes. Remove the pack and wait at least 30 minutes. Repeat for up to 3 days. This helps reduce swelling.    Compression: To reduce swelling and keep the joint stable, you may need to wrap the ankle with an elastic bandage. For more severe sprains, you may need an ankle brace, a boot, or a cast.    Elevation: To reduce swelling, keep your ankle raised above your heart when you sit or lie down.  Medicine  Your healthcare provider may suggest oral nonsteroidal anti-inflammatory medicine (NSAIDs), such as ibuprofen. This relieves the pain and helps reduce swelling. Be sure to take your medicine as directed.  Exercises    After about 2 to 3 weeks, you may be given exercises to strengthen the ligaments and muscles in the ankle. Doing these exercises will help prevent another ankle sprain. Exercises may include standing on your toes and then on your heels and doing ankle curls.  Ankle curls    Sit on the edge of a sturdy table or lie on your back.    Pull your toes toward you. Then point them away from you. Repeat for 2 to 3 minutes.   Date Last Reviewed: 1/1/2018 2000-2017 [employer]. [address] [place] [address], [place] [address]. All rights reserved. This information is not intended as a substitute for professional medical care. Always follow your healthcare professional's instructions.          Ankle Inversion (Strength)     This exercise is for your right ankle. Switch sides for your left ankle.   1. Tie an elastic exercise band or tubing to the leg of a table. Tie the other end to your right foot.  2. Stand far enough away from the table so that the  elastic band or tubing is pulled tight.  3. Point your right foot firmly to the left, pulling on the elastic band or tubing. Hold for 5 seconds.  4. Relax your foot back to a straight position. Repeat this exercise 10 times.  5. Do this exercise 3 times a day, or as instructed.  Date Last Reviewed: 5/1/2016 2000-2017 [employer]. [address] [place] [address]. All rights reserved. This information is not intended as a substitute for professional medical care. Always follow your healthcare professional's instructions.

## 2018-06-07 NOTE — ED NOTES
"Pt denies hitting head. Pt has skin abrasion of forehead but states that is from his work helmet \"something in it I react too\".  "

## 2018-06-07 NOTE — ED NOTES
Pt states he has crutches at home that he can use. Pt stated he did not want to wait for crutch delivery.

## 2018-06-07 NOTE — ED PROVIDER NOTES
History     Chief Complaint   Patient presents with     Ankle Pain     Pt was rounding first and rolled left ankle.     HPI  Kannan Ordonez is a 28 year old male who presents to ER by ambulance after having a left ankle injury . Patient was rounding first base playing softball when he twisted his left ankle. EMS was called. Patient arrived to ER with ankle in a splint . Patient had received 100 mcg of fentanyl in rig. CMS intact NO other injuries NO deformity . Swelling localized to lateral malleolus.     Problem List:    Patient Active Problem List    Diagnosis Date Noted     Reactive airway disease that is not asthma 02/12/2018     Priority: Medium     Heart murmur 11/20/2017     Priority: Medium     Vitamin D deficiency 10/24/2017     Priority: Medium     Anxiety 06/24/2016     Priority: Medium     Recurrent major depressive disorder (H) 06/24/2016     Priority: Medium     ACP (advance care planning) 05/26/2016     Priority: Medium     Advance Care Planning 5/26/2016: ACP Review of Chart / Resources Provided:  Reviewed chart for advance care plan.  Kannan Ordonez has been provided information and resources to begin or update their advance care plan.  Added by ELVA IBARRA                Past Medical History:    Past Medical History:   Diagnosis Date     Anxiety 6/24/2016     Asthma      Heart murmur 11/20/2017     Hyperlipidemia LDL goal <100 12/16/2015     Hypertension 3/3/2015     Hypothyroidism 5/1/2015     Reactive airway disease that is not asthma 2/12/2018     Recurrent major depressive disorder (H) 6/24/2016     Vitamin D deficiency 10/24/2017       Past Surgical History:    Past Surgical History:   Procedure Laterality Date     ORTHOPEDIC SURGERY  2015    r shoulder teat bone spur     ORTHOPEDIC SURGERY  2-2016    AC joint right     ORTHOPEDIC SURGERY  2016    Rt labrial tear     ORTHOPEDIC SURGERY  06/2017    revision decompression subpectoral biceps tenodesis        Family History:   "  Family History   Problem Relation Age of Onset     DIABETES Mother      Hypertension Mother      Hyperlipidemia Mother      Coronary Artery Disease Father      Hyperlipidemia Father      Hypertension Father      Thyroid Disease No family hx of        Social History:  Marital Status:  Single [1]  Social History   Substance Use Topics     Smoking status: Never Smoker     Smokeless tobacco: Never Used     Alcohol use Yes      Comment: occ        Medications:      albuterol (PROAIR HFA/PROVENTIL HFA/VENTOLIN HFA) 108 (90 BASE) MCG/ACT Inhaler   buPROPion (WELLBUTRIN XL) 150 MG 24 hr tablet   albuterol (2.5 MG/3ML) 0.083% neb solution   budesonide-formoterol (SYMBICORT) 160-4.5 MCG/ACT Inhaler         Review of Systems   Musculoskeletal: Positive for joint swelling.        Left ankle pain and swelling    All other systems reviewed and are negative.      Physical Exam   BP: 123/82  Heart Rate: 106  Temp: 98.2  F (36.8  C)  Resp: 18  Height: 188 cm (6' 2\")  SpO2: 95 %      Physical Exam   Constitutional: He is oriented to person, place, and time. He appears well-developed and well-nourished. No distress.   HENT:   Head: Normocephalic and atraumatic.   Eyes: Pupils are equal, round, and reactive to light.   Neck: Normal range of motion. Neck supple.   Pulmonary/Chest: Effort normal and breath sounds normal.   Abdominal: Soft. Bowel sounds are normal.   Musculoskeletal: He exhibits edema and tenderness. He exhibits no deformity.   Left lateral ankle tender , swollen . Slight bruising. Normal movement and sensation of toes . Strongly palpable dorsalis pedis and posterior tibial pulses    Neurological: He is alert and oriented to person, place, and time.   Skin: Skin is warm and dry. He is not diaphoretic.   Psychiatric: He has a normal mood and affect.   Nursing note and vitals reviewed.      ED Course     ED Course     Procedures          Patient arrived to ER by ambulance with complaint of ankle injury Patient triaged to " exam room . History and exam completed. Imaging studies ordered. Imaging and exam suggestive of ankle strain. Compression dressing, gel cast , ordered. Recommend antiinflammatoried, ice next 48 hours than as needed. Follow up with primary care if not improving within 1 week . Discussed with patient he will be contacted if there is any discrepancy in the final radiology reading        No results found for this or any previous visit (from the past 24 hour(s)).    Medications   ketorolac (TORADOL) injection 60 mg (60 mg Intramuscular Given 6/6/18 7590)       Assessments & Plan (with Medical Decision Making)     I have reviewed the nursing notes.    I have reviewed the findings, diagnosis, plan and need for follow up with the patient.      New Prescriptions    No medications on file       Final diagnoses:   Sprain of left ankle, unspecified ligament, initial encounter       6/6/2018   [facility]     Eli Robins MD  06/06/18 7384

## 2018-06-08 ENCOUNTER — OFFICE VISIT (OUTPATIENT)
Dept: FAMILY MEDICINE | Facility: OTHER | Age: 28
End: 2018-06-08
Attending: FAMILY MEDICINE
Payer: COMMERCIAL

## 2018-06-08 VITALS
HEIGHT: 74 IN | SYSTOLIC BLOOD PRESSURE: 122 MMHG | HEART RATE: 78 BPM | OXYGEN SATURATION: 98 % | TEMPERATURE: 97.2 F | BODY MASS INDEX: 33.37 KG/M2 | WEIGHT: 260 LBS | RESPIRATION RATE: 14 BRPM | DIASTOLIC BLOOD PRESSURE: 82 MMHG

## 2018-06-08 DIAGNOSIS — S93.402D SPRAIN OF LEFT ANKLE, UNSPECIFIED LIGAMENT, SUBSEQUENT ENCOUNTER: Primary | ICD-10-CM

## 2018-06-08 PROCEDURE — 99213 OFFICE O/P EST LOW 20 MIN: CPT | Performed by: FAMILY MEDICINE

## 2018-06-08 ASSESSMENT — ANXIETY QUESTIONNAIRES
2. NOT BEING ABLE TO STOP OR CONTROL WORRYING: NOT AT ALL
GAD7 TOTAL SCORE: 2
1. FEELING NERVOUS, ANXIOUS, OR ON EDGE: SEVERAL DAYS
IF YOU CHECKED OFF ANY PROBLEMS ON THIS QUESTIONNAIRE, HOW DIFFICULT HAVE THESE PROBLEMS MADE IT FOR YOU TO DO YOUR WORK, TAKE CARE OF THINGS AT HOME, OR GET ALONG WITH OTHER PEOPLE: NOT DIFFICULT AT ALL
6. BECOMING EASILY ANNOYED OR IRRITABLE: SEVERAL DAYS
3. WORRYING TOO MUCH ABOUT DIFFERENT THINGS: NOT AT ALL
7. FEELING AFRAID AS IF SOMETHING AWFUL MIGHT HAPPEN: NOT AT ALL
5. BEING SO RESTLESS THAT IT IS HARD TO SIT STILL: NOT AT ALL
4. TROUBLE RELAXING: NOT AT ALL

## 2018-06-08 ASSESSMENT — PAIN SCALES - GENERAL: PAINLEVEL: MODERATE PAIN (4)

## 2018-06-08 NOTE — MR AVS SNAPSHOT
"              After Visit Summary   2018    Kannan Ordonez    MRN: [id number]           Patient Information     Date Of Birth          1990        Visit Information        Provider Department      2018 9:00 AM Rea Kenny MD [facility]        Today's Diagnoses     Sprain of left ankle, unspecified ligament, subsequent encounter    -  1       Follow-ups after your visit        Who to contact     If you have questions or need follow up information about today's clinic visit or your schedule please contact [facility] directly at [phone].  Normal or non-critical lab and imaging results will be communicated to you by PhotoPharmicshart, letter or phone within 4 business days after the clinic has received the results. If you do not hear from us within 7 days, please contact the clinic through PhotoPharmicshart or phone. If you have a critical or abnormal lab result, we will notify you by phone as soon as possible.  Submit refill requests through Meeting To You or call your pharmacy and they will forward the refill request to us. Please allow 3 business days for your refill to be completed.          Additional Information About Your Visit        MyChart Information     Meeting To You lets you send messages to your doctor, view your test results, renew your prescriptions, schedule appointments and more. To sign up, go to [email or web address] . Click on \"Log in\" on the left side of the screen, which will take you to the Welcome page. Then click on \"Sign up Now\" on the right side of the page.     You will be asked to enter the access code listed below, as well as some personal information. Please follow the directions to create your username and password.     Your access code is: [email or web address]  Expires: 2018  8:53 AM     Your access code will  in 90 days. If you need help or a new code, please call your [facility] or [phone].        Care EveryWhere ID     This is your Care " "EveryWhere ID. This could be used by other organizations to access your [facility] medical records  [id number]        Your Vitals Were     Pulse Temperature Respirations Height Pulse Oximetry BMI (Body Mass Index)    78 97.2  F (36.2  C) (Tympanic) 14 6' 2\" (1.88 m) 98% 33.38 kg/m2       Blood Pressure from Last 3 Encounters:   06/08/18 122/82   06/06/18 110/68   04/20/18 130/70    Weight from Last 3 Encounters:   06/08/18 260 lb (117.9 kg)   04/20/18 260 lb (117.9 kg)   03/19/18 257 lb (116.6 kg)              Today, you had the following     No orders found for display       Primary Care Provider Office Phone # Fax #    Yeimy LEON Corona [phone] [address]  [place] [address]        Equal Access to Services     [facility] : Hadii aad ku hadasho Solutherali, waaxda luqadaha, qaybta kaalmada adeegyada, leigh woodsin rajan hatch . So [facility] [phone].    ATENCIÓN: Si habla español, tiene a underwood disposición servicios gratuitos de asistencia lingüística. Thu al [phone].    We comply with applicable federal civil rights laws and [place] laws. We do not discriminate on the basis of race, color, national origin, age, disability, sex, sexual orientation, or gender identity.            Thank you!     Thank you for choosing [facility]  for your care. Our goal is always to provide you with excellent care. Hearing back from our patients is one way we can continue to improve our services. Please take a few minutes to complete the written survey that you may receive in the mail after your visit with us. Thank you!             Your Updated Medication List - Protect others around you: Learn how to safely use, store and throw away your medicines at www.disposemymeds.org.          This list is accurate as of 6/8/18 10:06 AM.  Always use your most recent med list.                   Brand Name Dispense Instructions for use Diagnosis    * albuterol 108 (90 Base) " MCG/ACT Inhaler    PROAIR HFA/PROVENTIL HFA/VENTOLIN HFA    1 Inhaler    Inhale 2 puffs into the lungs every 6 hours as needed for shortness of breath / dyspnea    Mild intermittent asthma without complication       * albuterol (2.5 MG/3ML) 0.083% neb solution     25 vial    Take 1 vial (2.5 mg) by nebulization every 6 hours as needed for shortness of breath / dyspnea or wheezing    Wheezing, Acute bronchitis, unspecified organism       budesonide-formoterol 160-4.5 MCG/ACT Inhaler    SYMBICORT    3 Inhaler    Inhale 2 puffs into the lungs 2 times daily    Mild persistent asthma without complication       buPROPion 150 MG 24 hr tablet    WELLBUTRIN XL    30 tablet    Take 1 tablet (150 mg) by mouth daily    Major depressive disorder, recurrent episode, moderate (H)       * Notice:  This list has 2 medication(s) that are the same as other medications prescribed for you. Read the directions carefully, and ask your doctor or other care provider to review them with you.

## 2018-06-08 NOTE — PROGRESS NOTES
"  SUBJECTIVE:   Kannan Ordonez is a 28 year old male who presents to clinic today for the following health issues:      ED/UC Followup:    Facility:  [facility] ED  Date of visit: 6/6/18  Reason for visit: Ankle Injury - Left  Current Status: Sore but ok. Needs release to work.     Pt twisted his left ankle playing soft ball on 6/6. Was brought into ED via ambulance for evaluation. XR was negative for acute fracture. Dx with acute ankle sprain and advised supportive cares. He has been icing, keeping wrapped and elevated. Pain and swelling improved. He is able to ambulate on the foot. He would like to return to work next week and is hoping for a note from us to allow this. He works on the [employer] and in on his feet often. He, however, feels he will be able to tolerate and limit activities if needed.     Problem list and histories reviewed & adjusted, as indicated.  Additional history: as documented    Labs reviewed in EPIC    Reviewed and updated as needed this visit by clinical staff  Tobacco  Allergies  Meds  Problems  Med Hx  Surg Hx  Fam Hx  Soc Hx        Reviewed and updated as needed this visit by Provider  Allergies  Meds  Problems         ROS:  Constitutional, HEENT, cardiovascular, pulmonary, gi and gu systems are negative, except as otherwise noted.    OBJECTIVE:     /82 (BP Location: Left arm, Patient Position: Chair, Cuff Size: Adult Large)  Pulse 78  Temp 97.2  F (36.2  C) (Tympanic)  Resp 14  Ht 6' 2\" (1.88 m)  Wt 260 lb (117.9 kg)  SpO2 98%  BMI 33.38 kg/m2  Body mass index is 33.38 kg/(m^2).  GENERAL: healthy, alert and no distress  MS: Left ankle with ongoing swelling lateral ankle. Mild ttp over lateral ankle. ROM full, strength intact. No evidence of nerve or vascular injury.     Diagnostic Test Results:  none     ASSESSMENT/PLAN:     1. Sprain of left ankle, unspecified ligament, subsequent encounter  Improving with conservative management. Patient is ambulating without " significant difficulty. Would like to return to work in 4 days which is likely reasonable. Advised continuing nsaid, compression and elevation as able. Follow up if not improved. In 2 weeks.     Rea Kenny MD  [facility]

## 2018-06-08 NOTE — NURSING NOTE
"Chief Complaint   Patient presents with     ER F/U     ankle injury       Initial /82 (BP Location: Left arm, Patient Position: Chair, Cuff Size: Adult Large)  Pulse 78  Temp 97.2  F (36.2  C) (Tympanic)  Resp 14  Ht 6' 2\" (1.88 m)  Wt 260 lb (117.9 kg)  SpO2 98%  BMI 33.38 kg/m2 Estimated body mass index is 33.38 kg/(m^2) as calculated from the following:    Height as of this encounter: 6' 2\" (1.88 m).    Weight as of this encounter: 260 lb (117.9 kg).  Medication Reconciliation: complete    Jacqueline Andre LPN    "

## 2018-06-09 ASSESSMENT — ANXIETY QUESTIONNAIRES: GAD7 TOTAL SCORE: 2

## 2018-06-09 ASSESSMENT — PATIENT HEALTH QUESTIONNAIRE - PHQ9: SUM OF ALL RESPONSES TO PHQ QUESTIONS 1-9: 1

## 2018-07-06 ENCOUNTER — OFFICE VISIT (OUTPATIENT)
Dept: FAMILY MEDICINE | Facility: OTHER | Age: 28
End: 2018-07-06
Attending: NURSE PRACTITIONER
Payer: COMMERCIAL

## 2018-07-06 VITALS
OXYGEN SATURATION: 98 % | HEART RATE: 76 BPM | SYSTOLIC BLOOD PRESSURE: 124 MMHG | HEIGHT: 74 IN | DIASTOLIC BLOOD PRESSURE: 82 MMHG | TEMPERATURE: 96.3 F | BODY MASS INDEX: 34.52 KG/M2 | RESPIRATION RATE: 14 BRPM | WEIGHT: 269 LBS

## 2018-07-06 DIAGNOSIS — F41.9 ANXIETY: ICD-10-CM

## 2018-07-06 DIAGNOSIS — J98.9 REACTIVE AIRWAY DISEASE THAT IS NOT ASTHMA: ICD-10-CM

## 2018-07-06 DIAGNOSIS — L01.00 IMPETIGO: ICD-10-CM

## 2018-07-06 DIAGNOSIS — F33.1 MODERATE EPISODE OF RECURRENT MAJOR DEPRESSIVE DISORDER (H): Primary | ICD-10-CM

## 2018-07-06 DIAGNOSIS — E55.9 VITAMIN D DEFICIENCY: ICD-10-CM

## 2018-07-06 PROCEDURE — 36415 COLL VENOUS BLD VENIPUNCTURE: CPT | Performed by: NURSE PRACTITIONER

## 2018-07-06 PROCEDURE — 99000 SPECIMEN HANDLING OFFICE-LAB: CPT | Performed by: NURSE PRACTITIONER

## 2018-07-06 PROCEDURE — 99214 OFFICE O/P EST MOD 30 MIN: CPT | Performed by: NURSE PRACTITIONER

## 2018-07-06 PROCEDURE — 82306 VITAMIN D 25 HYDROXY: CPT | Mod: 90 | Performed by: NURSE PRACTITIONER

## 2018-07-06 RX ORDER — MUPIROCIN CALCIUM 20 MG/G
CREAM TOPICAL 3 TIMES DAILY
Qty: 30 G | Refills: 0 | Status: SHIPPED | OUTPATIENT
Start: 2018-07-06 | End: 2018-08-15

## 2018-07-06 RX ORDER — HYDROXYZINE HYDROCHLORIDE 25 MG/1
25-50 TABLET, FILM COATED ORAL EVERY 6 HOURS PRN
Qty: 60 TABLET | Refills: 1 | Status: SHIPPED | OUTPATIENT
Start: 2018-07-06 | End: 2018-08-28

## 2018-07-06 RX ORDER — DOXYCYCLINE 100 MG/1
100 CAPSULE ORAL 2 TIMES DAILY
Qty: 20 CAPSULE | Refills: 0 | Status: SHIPPED | OUTPATIENT
Start: 2018-07-06 | End: 2018-08-15

## 2018-07-06 ASSESSMENT — ANXIETY QUESTIONNAIRES
3. WORRYING TOO MUCH ABOUT DIFFERENT THINGS: NEARLY EVERY DAY
1. FEELING NERVOUS, ANXIOUS, OR ON EDGE: NEARLY EVERY DAY
6. BECOMING EASILY ANNOYED OR IRRITABLE: NEARLY EVERY DAY
5. BEING SO RESTLESS THAT IT IS HARD TO SIT STILL: SEVERAL DAYS
7. FEELING AFRAID AS IF SOMETHING AWFUL MIGHT HAPPEN: SEVERAL DAYS
2. NOT BEING ABLE TO STOP OR CONTROL WORRYING: NEARLY EVERY DAY
GAD7 TOTAL SCORE: 17

## 2018-07-06 ASSESSMENT — PATIENT HEALTH QUESTIONNAIRE - PHQ9: 5. POOR APPETITE OR OVEREATING: NEARLY EVERY DAY

## 2018-07-06 ASSESSMENT — PAIN SCALES - GENERAL: PAINLEVEL: NO PAIN (0)

## 2018-07-06 NOTE — PATIENT INSTRUCTIONS
ASSESSMENT/PLAN:       1. Moderate episode of recurrent major depressive disorder (H)  Continue wellbutrin    2. Anxiety  symptomatic  - sertraline (ZOLOFT) 50 MG tablet; Take 1 tablet (50 mg) by mouth daily  Dispense: 30 tablet; Refill: 1  - hydrOXYzine (ATARAX) 25 MG tablet; Take 1-2 tablets (25-50 mg) by mouth every 6 hours as needed for anxiety (or insomnia)  Dispense: 60 tablet; Refill: 1    3. Reactive airway disease that is not asthma  Continue current plan    4. Vitamin D deficiency  - Vitamin D Deficiency    5. Impetigo  symptomatic  - doxycycline (VIBRAMYCIN) 100 MG capsule; Take 1 capsule (100 mg) by mouth 2 times daily  Dispense: 20 capsule; Refill: 0  - mupirocin (BACTROBAN) 2 % cream; Apply topically 3 times daily  Dispense: 30 g; Refill: 0          FUTURE APPOINTMENTS:       - Follow-up visit in 2 weeks or as needed for acute concerns.     Isabela Gonzalez, NP  [facility]

## 2018-07-06 NOTE — PROGRESS NOTES
SUBJECTIVE:   Kannan Ordonez is a 28 year old male who presents to clinic today for the following health issues:      Depression and Anxiety Follow-Up    Status since last visit: Worsened     Other associated symptoms:insomnia, worsening     Complicating factors:     Significant life event: stressful job, will be working out of town for 1 month.      Current substance abuse: None    PHQ-9 4/20/2018 6/8/2018 7/6/2018   Total Score 0 1 12   Q9: Suicide Ideation Not at all Not at all Not at all     ÁLVARO-7 SCORE 4/20/2018 6/8/2018 7/6/2018   Total Score 0 2 17       PHQ-9  English  PHQ-9   Any Language  ÁLVARO-7  Suicide Assessment Five-step Evaluation and Treatment (SAFE-T)    Amount of exercise or physical activity: Works out 3-4 days    Problems taking medications regularly: No    Medication side effects: none    Diet: regular (no restrictions)      Problem list and histories reviewed & adjusted, as indicated.  Additional history: as documented    Patient Active Problem List   Diagnosis     ACP (advance care planning)     Anxiety     Recurrent major depressive disorder (H)     Vitamin D deficiency     Heart murmur     Reactive airway disease that is not asthma     Past Surgical History:   Procedure Laterality Date     ORTHOPEDIC SURGERY  2015    r shoulder teat bone spur     ORTHOPEDIC SURGERY  2-2016    AC joint right     ORTHOPEDIC SURGERY  2016    Rt labrial tear     ORTHOPEDIC SURGERY  06/2017    revision decompression subpectoral biceps tenodesis        Social History   Substance Use Topics     Smoking status: Never Smoker     Smokeless tobacco: Never Used     Alcohol use Yes      Comment: occ     Family History   Problem Relation Age of Onset     Diabetes Mother      Hypertension Mother      Hyperlipidemia Mother      Coronary Artery Disease Father      Hyperlipidemia Father      Hypertension Father      Thyroid Disease No family hx of          Current Outpatient Prescriptions   Medication Sig Dispense  Refill     albuterol (2.5 MG/3ML) 0.083% neb solution Take 1 vial (2.5 mg) by nebulization every 6 hours as needed for shortness of breath / dyspnea or wheezing 25 vial 1     albuterol (PROAIR HFA/PROVENTIL HFA/VENTOLIN HFA) 108 (90 BASE) MCG/ACT Inhaler Inhale 2 puffs into the lungs every 6 hours as needed for shortness of breath / dyspnea 1 Inhaler 1     budesonide-formoterol (SYMBICORT) 160-4.5 MCG/ACT Inhaler Inhale 2 puffs into the lungs 2 times daily 3 Inhaler 1     buPROPion (WELLBUTRIN XL) 150 MG 24 hr tablet Take 1 tablet (150 mg) by mouth daily 30 tablet 5     Allergies   Allergen Reactions     Morphine Sulfate Rash     Cats Other (See Comments)     Other reaction(s): Eye Irritation, Sneezing     Dogs Other (See Comments)     Other reaction(s): Eye Irritation, Sneezing     Morphine Hives     Recent Labs   Lab Test  03/02/18   0902 11/17/17  10/24/17   1439  06/08/17   0922  05/15/17   1630  02/13/17   1020  11/07/16   1403   02/02/16   1519   08/28/15   1449   A1C   --    --   5.1   --    --    --    --    --    --    --    --    LDL   --    --    --    --   125*  107*  103*   < >   --    < >   --    HDL   --    --    --    --   62  46  47   < >   --    < >   --    TRIG   --    --    --    --   136  147  167*   < >   --    < >   --    ALT   --    --    --   82*   --    --    --    --   78*   --   51   CR   --   0.98  0.95  0.99  0.87  0.86  1.01   < >  1.19   < >  0.84   GFRESTIMATED   --    --   >90  >90  Non African American GFR Calc    >90  Non  GFR Calc    >90  Non  GFR Calc    89   < >  74   < >  >90  Non  GFR Calc     GFRESTBLACK   --    --   >90  >90  African American GFR Calc    >90   GFR Calc    >90   GFR Calc    >90   GFR Calc     < >  89   < >  >90   GFR Calc     POTASSIUM   --   3.7  3.9  4.3  3.7  4.6  3.9   < >  3.8   < >  4.1   TSH  2.58   --   1.94   --   3.08  1.92  2.86   < >   "4.41*   < >   --     < > = values in this interval not displayed.      BP Readings from Last 3 Encounters:   07/06/18 124/82   06/08/18 122/82   06/06/18 110/68    Wt Readings from Last 3 Encounters:   07/06/18 269 lb (122 kg)   06/08/18 260 lb (117.9 kg)   04/20/18 260 lb (117.9 kg)                    Reviewed and updated as needed this visit by clinical staff       Reviewed and updated as needed this visit by Provider         ROS:  Constitutional, HEENT, cardiovascular, pulmonary, gi and gu systems are negative, except as otherwise noted.    OBJECTIVE:     /82 (BP Location: Left arm, Patient Position: Sitting, Cuff Size: Adult Large)  Pulse 76  Temp 96.3  F (35.7  C) (Tympanic)  Resp 14  Ht 6' 2\" (1.88 m)  Wt 269 lb (122 kg)  SpO2 98%  BMI 34.54 kg/m2  Body mass index is 34.54 kg/(m^2).  GENERAL: healthy, alert and no distress  RESP: lungs clear to auscultation - no rales, rhonchi or wheezes  CV: regular rate and rhythm, normal S1 S2, no S3 or S4, no murmur, click or rub, no peripheral edema and peripheral pulses strong  MS: no gross musculoskeletal defects noted, no edema  SKIN:  Forehead, vesicular lesion with serosanguinous crusty drainage  NEURO: Normal strength and tone, mentation intact and speech normal  PSYCH: mentation appears normal, affect normal/bright      ASSESSMENT/PLAN:       1. Moderate episode of recurrent major depressive disorder (H)  Continue wellbutrin    2. Anxiety  symptomatic  - start sertraline (ZOLOFT) 50 MG tablet; Take 1 tablet (50 mg) by mouth daily  Dispense: 30 tablet; Refill: 1  - hydrOXYzine (ATARAX) 25 MG tablet; Take 1-2 tablets (25-50 mg) by mouth every 6 hours as needed for anxiety (or insomnia)  Dispense: 60 tablet; Refill: 1    3. Reactive airway disease that is not asthma  Continue current plan    4. Vitamin D deficiency  - Vitamin D Deficiency    5. Impetigo  symptomatic  - doxycycline (VIBRAMYCIN) 100 MG capsule; Take 1 capsule (100 mg) by mouth 2 times daily "  Dispense: 20 capsule; Refill: 0  - mupirocin (BACTROBAN) 2 % cream; Apply topically 3 times daily  Dispense: 30 g; Refill: 0      FUTURE APPOINTMENTS:       - Follow-up visit in 2 weeks or as needed for acute concerns.     Isabela Gonzalez NP  [facility]

## 2018-07-06 NOTE — MR AVS SNAPSHOT
After Visit Summary   7/6/2018    Kannan Ordonez    MRN: [id number]           Patient Information     Date Of Birth          1990        Visit Information        Provider Department      7/6/2018 8:15 AM Isabela Gonzalez NP [facility]        Today's Diagnoses     Moderate episode of recurrent major depressive disorder (H)    -  1    Anxiety        Reactive airway disease that is not asthma        Vitamin D deficiency        Impetigo          Care Instructions      ASSESSMENT/PLAN:       1. Moderate episode of recurrent major depressive disorder (H)  Continue wellbutrin    2. Anxiety  symptomatic  - sertraline (ZOLOFT) 50 MG tablet; Take 1 tablet (50 mg) by mouth daily  Dispense: 30 tablet; Refill: 1  - hydrOXYzine (ATARAX) 25 MG tablet; Take 1-2 tablets (25-50 mg) by mouth every 6 hours as needed for anxiety (or insomnia)  Dispense: 60 tablet; Refill: 1    3. Reactive airway disease that is not asthma  Continue current plan    4. Vitamin D deficiency  - Vitamin D Deficiency    5. Impetigo  symptomatic  - doxycycline (VIBRAMYCIN) 100 MG capsule; Take 1 capsule (100 mg) by mouth 2 times daily  Dispense: 20 capsule; Refill: 0  - mupirocin (BACTROBAN) 2 % cream; Apply topically 3 times daily  Dispense: 30 g; Refill: 0          FUTURE APPOINTMENTS:       - Follow-up visit in 2 weeks or as needed for acute concerns.     Isabela Gonzalez NP  [facility]          Follow-ups after your visit        Follow-up notes from your care team     Return in about 2 weeks (around 7/20/2018), or if symptoms worsen or fail to improve.      Who to contact     If you have questions or need follow up information about today's clinic visit or your schedule please contact [facility] directly at [phone].  Normal or non-critical lab and imaging results will be communicated to you by MyChart, letter or phone within 4 business days after the clinic has  "received the results. If you do not hear from us within 7 days, please contact the clinic through [employer] or phone. If you have a critical or abnormal lab result, we will notify you by phone as soon as possible.  Submit refill requests through [employer] or call your pharmacy and they will forward the refill request to us. Please allow 3 business days for your refill to be completed.          Additional Information About Your Visit        Care EveryWhere ID     This is your Care EveryWhere ID. This could be used by other organizations to access your [place] medical records  [id number]        Your Vitals Were     Pulse Temperature Respirations Height Pulse Oximetry BMI (Body Mass Index)    76 96.3  F (35.7  C) (Tympanic) 14 6' 2\" (1.88 m) 98% 34.54 kg/m2       Blood Pressure from Last 3 Encounters:   07/06/18 124/82   06/08/18 122/82   06/06/18 110/68    Weight from Last 3 Encounters:   07/06/18 269 lb (122 kg)   06/08/18 260 lb (117.9 kg)   04/20/18 260 lb (117.9 kg)              We Performed the Following     Vitamin D Deficiency          Today's Medication Changes          These changes are accurate as of 7/6/18  8:53 AM.  If you have any questions, ask your nurse or doctor.               Start taking these medicines.        Dose/Directions    doxycycline 100 MG capsule   Commonly known as:  VIBRAMYCIN   Used for:  Impetigo   Started by:  Isabela Gonzalez NP        Dose:  100 mg   Take 1 capsule (100 mg) by mouth 2 times daily   Quantity:  20 capsule   Refills:  0       hydrOXYzine 25 MG tablet   Commonly known as:  ATARAX   Used for:  Anxiety   Started by:  Isabela Gonzalez NP        Dose:  25-50 mg   Take 1-2 tablets (25-50 mg) by mouth every 6 hours as needed for anxiety (or insomnia)   Quantity:  60 tablet   Refills:  1       mupirocin 2 % cream   Commonly known as:  BACTROBAN   Used for:  Impetigo   Started by:  Isabela Gonzalez NP        Apply topically 3 times daily   Quantity:  " 30 g   Refills:  0       sertraline 50 MG tablet   Commonly known as:  ZOLOFT   Used for:  Anxiety   Started by:  Isabela Gonzalez NP        Dose:  50 mg   Take 1 tablet (50 mg) by mouth daily   Quantity:  30 tablet   Refills:  1            Where to get your medicines      These medications were sent to [id number] - [place], [place] - [address] [place] [address] [place] AT [facility] [address] [place] [address]     Phone:  [phone]     doxycycline 100 MG capsule    hydrOXYzine 25 MG tablet    mupirocin 2 % cream    sertraline 50 MG tablet                Primary Care Provider Office Phone # Fax #    Yeimy CoronaLEON [phone] [address]  [place] [address]        Equal Access to Services     [facility]: Hadii moose varghese hadasho Soomaali, waaxda luqadaha, qaybta kaalmada adeegyada, leigh hatch . So [facility] [phone].    ATENCIÓN: Si habla español, tiene a underwood disposición servicios gratuitos de asistencia lingüística. Thu al [phone].    We comply with applicable federal civil rights laws and [place] laws. We do not discriminate on the basis of race, color, national origin, age, disability, sex, sexual orientation, or gender identity.            Thank you!     Thank you for choosing [facility]  for your care. Our goal is always to provide you with excellent care. Hearing back from our patients is one way we can continue to improve our services. Please take a few minutes to complete the written survey that you may receive in the mail after your visit with us. Thank you!             Your Updated Medication List - Protect others around you: Learn how to safely use, store and throw away your medicines at www.disposemymeds.org.          This list is accurate as of 7/6/18  8:53 AM.  Always use your most recent med list.                   Brand Name Dispense Instructions for use Diagnosis    * albuterol  108 (90 Base) MCG/ACT Inhaler    PROAIR HFA/PROVENTIL HFA/VENTOLIN HFA    1 Inhaler    Inhale 2 puffs into the lungs every 6 hours as needed for shortness of breath / dyspnea    Mild intermittent asthma without complication       * albuterol (2.5 MG/3ML) 0.083% neb solution     25 vial    Take 1 vial (2.5 mg) by nebulization every 6 hours as needed for shortness of breath / dyspnea or wheezing    Wheezing, Acute bronchitis, unspecified organism       budesonide-formoterol 160-4.5 MCG/ACT Inhaler    SYMBICORT    3 Inhaler    Inhale 2 puffs into the lungs 2 times daily    Mild persistent asthma without complication       buPROPion 150 MG 24 hr tablet    WELLBUTRIN XL    30 tablet    Take 1 tablet (150 mg) by mouth daily    Major depressive disorder, recurrent episode, moderate (H)       doxycycline 100 MG capsule    VIBRAMYCIN    20 capsule    Take 1 capsule (100 mg) by mouth 2 times daily    Impetigo       hydrOXYzine 25 MG tablet    ATARAX    60 tablet    Take 1-2 tablets (25-50 mg) by mouth every 6 hours as needed for anxiety (or insomnia)    Anxiety       mupirocin 2 % cream    BACTROBAN    30 g    Apply topically 3 times daily    Impetigo       sertraline 50 MG tablet    ZOLOFT    30 tablet    Take 1 tablet (50 mg) by mouth daily    Anxiety       * Notice:  This list has 2 medication(s) that are the same as other medications prescribed for you. Read the directions carefully, and ask your doctor or other care provider to review them with you.

## 2018-07-06 NOTE — NURSING NOTE
"Chief Complaint   Patient presents with     Anxiety     Depression       Initial /82 (BP Location: Left arm, Patient Position: Sitting, Cuff Size: Adult Large)  Pulse 76  Temp 96.3  F (35.7  C) (Tympanic)  Resp 14  Ht 6' 2\" (1.88 m)  Wt 269 lb (122 kg)  SpO2 98%  BMI 34.54 kg/m2 Estimated body mass index is 34.54 kg/(m^2) as calculated from the following:    Height as of this encounter: 6' 2\" (1.88 m).    Weight as of this encounter: 269 lb (122 kg).  Medication Reconciliation: complete    Annalise Freeman LPN    "

## 2018-07-07 ASSESSMENT — ANXIETY QUESTIONNAIRES: GAD7 TOTAL SCORE: 17

## 2018-07-07 ASSESSMENT — PATIENT HEALTH QUESTIONNAIRE - PHQ9: SUM OF ALL RESPONSES TO PHQ QUESTIONS 1-9: 12

## 2018-07-08 LAB — DEPRECATED CALCIDIOL+CALCIFEROL SERPL-MC: 41 UG/L (ref 20–75)

## 2018-08-09 ENCOUNTER — TRANSFERRED RECORDS (OUTPATIENT)
Dept: HEALTH INFORMATION MANAGEMENT | Facility: CLINIC | Age: 28
End: 2018-08-09

## 2018-08-13 ENCOUNTER — TELEPHONE (OUTPATIENT)
Dept: FAMILY MEDICINE | Facility: OTHER | Age: 28
End: 2018-08-13

## 2018-08-13 DIAGNOSIS — S46.212D TEAR OF LEFT BICEPS MUSCLE, SUBSEQUENT ENCOUNTER: Primary | ICD-10-CM

## 2018-08-13 RX ORDER — TRAMADOL HYDROCHLORIDE 50 MG/1
TABLET ORAL
Qty: 30 TABLET | Refills: 0 | Status: SHIPPED | OUTPATIENT
Start: 2018-08-13 | End: 2018-08-15

## 2018-08-13 NOTE — TELEPHONE ENCOUNTER
Patient was seen in the ER last Thursday for a possible torn bicep. He is scheduled with Dr. Amado on 8/21 and requesting pain medication until the appointment. The ER gave him 5. Please advise, [facility].

## 2018-08-13 NOTE — TELEPHONE ENCOUNTER
Patient called back and updated that RX for Ultram by PCP signed. This medication does nothing for his pain,and had this over a year ago. Updated him to contact  and he can fax PCP.Did not fax hard copy RX to pharmacy at this time. Fax number given to patient.Please note.Thank you.

## 2018-08-13 NOTE — TELEPHONE ENCOUNTER
Patient called back and  is not in office today.The nurse is unsure what is needed.Patient left nurse number Kathy at [phone]. Please advise.Thank you

## 2018-08-13 NOTE — TELEPHONE ENCOUNTER
Ice, Ibuprofen, re-try the ultram  Upcoming appt with Dr Amado  He can call Dr Amado nurse to relay what is going on, and see how Aneudy wishes to proceed.       Note, he no showed his last few appts.      Yeimy CORNEJO  [phone]

## 2018-08-13 NOTE — TELEPHONE ENCOUNTER
I signed Ultram - If he needs something stronger, Dr Amado will need to fax me.    [facility]  [phone]

## 2018-08-14 NOTE — PROGRESS NOTES
SUBJECTIVE:   Kannan Ordonez is a 28 year old male who presents to clinic today for the following health issues:      Rash  Onset: since April     Description:   Location: forehead   Character: painful, burning, red, come and goes   Itching (Pruritis): YES    Progression of Symptoms:  same    Accompanying Signs & Symptoms:  Fever: no   Body aches or joint pain: no   Sore throat symptoms: no   Recent cold symptoms: no     History:   Previous similar rash: YES- dx impetigo     Precipitating factors:   Exposure to similar rash: YES  New exposures: None   Recent travel: no     Alleviating factors:  Sweating     Therapies Tried and outcome: doxy and Bactroban cream         Asthma Follow-Up    Was ACT completed today?    Yes    ACT Total Scores 8/15/2018   ACT TOTAL SCORE -   ASTHMA ER VISITS -   ASTHMA HOSPITALIZATIONS -   ACT TOTAL SCORE (Goal Greater than or Equal to 20) 25   In the past 12 months, how many times did you visit the emergency room for your asthma without being admitted to the hospital? -   In the past 12 months, how many times were you hospitalized overnight because of your asthma? 0       Recent asthma triggers that patient is dealing with: upper respiratory infections        Problem list and histories reviewed & adjusted, as indicated.  Additional history: as documented    Patient Active Problem List   Diagnosis     ACP (advance care planning)     Anxiety     Recurrent major depressive disorder (H)     Vitamin D deficiency     Heart murmur     Reactive airway disease that is not asthma     Past Surgical History:   Procedure Laterality Date     ORTHOPEDIC SURGERY  2015    r shoulder teat bone spur     ORTHOPEDIC SURGERY  2-2016    AC joint right     ORTHOPEDIC SURGERY  2016    Rt labrial tear     ORTHOPEDIC SURGERY  06/2017    revision decompression subpectoral biceps tenodesis        Social History   Substance Use Topics     Smoking status: Never Smoker     Smokeless tobacco: Never Used      Alcohol use Yes      Comment: occ     Family History   Problem Relation Age of Onset     Diabetes Mother      Hypertension Mother      Hyperlipidemia Mother      Coronary Artery Disease Father      Hyperlipidemia Father      Hypertension Father      Thyroid Disease No family hx of          Current Outpatient Prescriptions   Medication Sig Dispense Refill     albuterol (2.5 MG/3ML) 0.083% neb solution Take 1 vial (2.5 mg) by nebulization every 6 hours as needed for shortness of breath / dyspnea or wheezing 25 vial 1     albuterol (PROAIR HFA/PROVENTIL HFA/VENTOLIN HFA) 108 (90 BASE) MCG/ACT Inhaler Inhale 2 puffs into the lungs every 6 hours as needed for shortness of breath / dyspnea 1 Inhaler 1     budesonide-formoterol (SYMBICORT) 160-4.5 MCG/ACT Inhaler Inhale 2 puffs into the lungs 2 times daily 3 Inhaler 1     buPROPion (WELLBUTRIN XL) 150 MG 24 hr tablet Take 1 tablet (150 mg) by mouth daily 30 tablet 5     hydrOXYzine (ATARAX) 25 MG tablet Take 1-2 tablets (25-50 mg) by mouth every 6 hours as needed for anxiety (or insomnia) 60 tablet 1     sertraline (ZOLOFT) 50 MG tablet Take 1 tablet (50 mg) by mouth daily 30 tablet 1     triamcinolone (KENALOG) 0.1 % cream Apply sparingly to affected area TID until clear 60 g 1     Allergies   Allergen Reactions     Morphine Sulfate Rash     Cats Other (See Comments)     Other reaction(s): Eye Irritation, Sneezing     Dogs Other (See Comments)     Other reaction(s): Eye Irritation, Sneezing     Morphine Hives     Recent Labs   Lab Test  03/02/18   0902 11/17/17  10/24/17   1439  06/08/17   0922  05/15/17   1630  02/13/17   1020  11/07/16   1403   02/02/16   1519   08/28/15   1449   A1C   --    --   5.1   --    --    --    --    --    --    --    --    LDL   --    --    --    --   125*  107*  103*   < >   --    < >   --    HDL   --    --    --    --   62  46  47   < >   --    < >   --    TRIG   --    --    --    --   136  147  167*   < >   --    < >   --    ALT   --    --     --   82*   --    --    --    --   78*   --   51   CR   --   0.98  0.95  0.99  0.87  0.86  1.01   < >  1.19   < >  0.84   GFRESTIMATED   --    --   >90  >90  Non African American GFR Calc    >90  Non  GFR Calc    >90  Non  GFR Calc    89   < >  74   < >  >90  Non  GFR Calc     GFRESTBLACK   --    --   >90  >90  African American GFR Calc    >90   GFR Calc    >90   GFR Calc    >90   GFR Calc     < >  89   < >  >90   GFR Calc     POTASSIUM   --   3.7  3.9  4.3  3.7  4.6  3.9   < >  3.8   < >  4.1   TSH  2.58   --   1.94   --   3.08  1.92  2.86   < >  4.41*   < >   --     < > = values in this interval not displayed.      BP Readings from Last 3 Encounters:   08/15/18 138/82   07/06/18 124/82   06/08/18 122/82    Wt Readings from Last 3 Encounters:   08/15/18 269 lb (122 kg)   07/06/18 269 lb (122 kg)   06/08/18 260 lb (117.9 kg)                  Labs reviewed in EPIC    Reviewed and updated as needed this visit by clinical staff       Reviewed and updated as needed this visit by Provider         ROS:  Constitutional, HEENT, cardiovascular, pulmonary, gi and gu systems are negative, except as otherwise noted.    OBJECTIVE:     /82 (BP Location: Right arm, Patient Position: Chair, Cuff Size: Adult Large)  Pulse 74  Temp 97.3  F (36.3  C) (Tympanic)  Wt 269 lb (122 kg)  SpO2 95%  BMI 34.54 kg/m2  Body mass index is 34.54 kg/(m^2).     GENERAL: healthy, alert and no distress  NECK: no adenopathy, no asymmetry, masses, or scars and thyroid normal to palpation  RESP: lungs clear to auscultation - no rales, rhonchi or wheezes  CV: regular rate and rhythm, normal S1 S2, no S3 or S4, no murmur, click or rub, no peripheral edema and peripheral pulses strong  SKIN: forehead rash, erythematous, no drainage - patchy, pruritic        ASSESSMENT/PLAN:     1. Atopic dermatitis, unspecified type  - triamcinolone  (KENALOG) 0.1 % cream; Apply sparingly to affected area TID until clear  Dispense: 60 g; Refill: 1  - Wash face with hypoallergenic soap (Aveeno, Cetaphil etc)      Follow-up if unimproved      Yeimy Corona NP  [facility]

## 2018-08-15 ENCOUNTER — OFFICE VISIT (OUTPATIENT)
Dept: FAMILY MEDICINE | Facility: OTHER | Age: 28
End: 2018-08-15
Attending: NURSE PRACTITIONER
Payer: COMMERCIAL

## 2018-08-15 VITALS
TEMPERATURE: 97.3 F | OXYGEN SATURATION: 95 % | SYSTOLIC BLOOD PRESSURE: 138 MMHG | WEIGHT: 269 LBS | HEART RATE: 74 BPM | DIASTOLIC BLOOD PRESSURE: 82 MMHG | BODY MASS INDEX: 34.54 KG/M2

## 2018-08-15 DIAGNOSIS — L20.9 ATOPIC DERMATITIS, UNSPECIFIED TYPE: Primary | ICD-10-CM

## 2018-08-15 PROCEDURE — 99213 OFFICE O/P EST LOW 20 MIN: CPT | Performed by: NURSE PRACTITIONER

## 2018-08-15 RX ORDER — TRIAMCINOLONE ACETONIDE 1 MG/G
CREAM TOPICAL
Qty: 60 G | Refills: 1 | Status: SHIPPED | OUTPATIENT
Start: 2018-08-15 | End: 2018-08-28

## 2018-08-15 ASSESSMENT — ANXIETY QUESTIONNAIRES
2. NOT BEING ABLE TO STOP OR CONTROL WORRYING: NEARLY EVERY DAY
5. BEING SO RESTLESS THAT IT IS HARD TO SIT STILL: NOT AT ALL
1. FEELING NERVOUS, ANXIOUS, OR ON EDGE: NOT AT ALL
4. TROUBLE RELAXING: NOT AT ALL
6. BECOMING EASILY ANNOYED OR IRRITABLE: NOT AT ALL
3. WORRYING TOO MUCH ABOUT DIFFERENT THINGS: NOT AT ALL
7. FEELING AFRAID AS IF SOMETHING AWFUL MIGHT HAPPEN: NOT AT ALL
GAD7 TOTAL SCORE: 3

## 2018-08-15 ASSESSMENT — PAIN SCALES - GENERAL: PAINLEVEL: MODERATE PAIN (5)

## 2018-08-15 NOTE — PATIENT INSTRUCTIONS
ASSESSMENT/PLAN:     1. Atopic dermatitis, unspecified type  - triamcinolone (KENALOG) 0.1 % cream; Apply sparingly to affected area TID until clear  Dispense: 60 g; Refill: 1  - Wash face with hypoallergenic soap (Aveeno, Cetaphil etc)      Follow-up if unimproved      Yeimy Corona NP  [facility]

## 2018-08-15 NOTE — NURSING NOTE
"Chief Complaint   Patient presents with     Derm Problem       Initial /82 (BP Location: Right arm, Patient Position: Chair, Cuff Size: Adult Large)  Pulse 74  Temp 97.3  F (36.3  C) (Tympanic)  Wt 269 lb (122 kg)  SpO2 95%  BMI 34.54 kg/m2 Estimated body mass index is 34.54 kg/(m^2) as calculated from the following:    Height as of 7/6/18: 6' 2\" (1.88 m).    Weight as of this encounter: 269 lb (122 kg).  Medication Reconciliation: complete    WILMA HELMS LPN  "

## 2018-08-15 NOTE — MR AVS SNAPSHOT
After Visit Summary   8/15/2018    Kannan Ordonez    MRN: [id number]           Patient Information     Date Of Birth          1990        Visit Information        Provider Department      8/15/2018 4:00 PM Yeimy Corona NP [facility]        Today's Diagnoses     Atopic dermatitis, unspecified type    -  1      Care Instructions        ASSESSMENT/PLAN:     1. Atopic dermatitis, unspecified type  - triamcinolone (KENALOG) 0.1 % cream; Apply sparingly to affected area TID until clear  Dispense: 60 g; Refill: 1  - Wash face with hypoallergenic soap (Aveeno, Cetaphil etc)      Follow-up if unimproved      Yeimy Corona NP  [facility]            Follow-ups after your visit        Future tests that were ordered for you today     Open Future Orders        Priority Expected Expires Ordered    MR Elbow Right w/o Contrast Routine  8/15/2019 8/15/2018            Who to contact     If you have questions or need follow up information about today's clinic visit or your schedule please contact [facility] directly at [phone].  Normal or non-critical lab and imaging results will be communicated to you by MyChart, letter or phone within 4 business days after the clinic has received the results. If you do not hear from us within 7 days, please contact the clinic through MyChart or phone. If you have a critical or abnormal lab result, we will notify you by phone as soon as possible.  Submit refill requests through [employer] or call your pharmacy and they will forward the refill request to us. Please allow 3 business days for your refill to be completed.          Additional Information About Your Visit        Care EveryWhere ID     This is your Care EveryWhere ID. This could be used by other organizations to access your [place] medical records  [id number]        Your Vitals Were     Pulse Temperature Pulse Oximetry BMI (Body Mass Index)          74 97.3  F (36.3  C)  (Tympanic) 95% 34.54 kg/m2         Blood Pressure from Last 3 Encounters:   08/15/18 138/82   07/06/18 124/82   06/08/18 122/82    Weight from Last 3 Encounters:   08/15/18 269 lb (122 kg)   07/06/18 269 lb (122 kg)   06/08/18 260 lb (117.9 kg)              Today, you had the following     No orders found for display         Today's Medication Changes          These changes are accurate as of 8/15/18  4:21 PM.  If you have any questions, ask your nurse or doctor.               Start taking these medicines.        Dose/Directions    triamcinolone 0.1 % cream   Commonly known as:  KENALOG   Used for:  Atopic dermatitis, unspecified type   Started by:  Yeimy Corona NP        Apply sparingly to affected area TID until clear   Quantity:  60 g   Refills:  1         Stop taking these medicines if you haven't already. Please contact your care team if you have questions.     doxycycline 100 MG capsule   Commonly known as:  VIBRAMYCIN   Stopped by:  Yeimy Corona NP           mupirocin 2 % cream   Commonly known as:  BACTROBAN   Stopped by:  Yeimy Corona NP           traMADol 50 MG tablet   Commonly known as:  ULTRAM   Stopped by:  Yeimy Corona NP                Where to get your medicines      These medications were sent to [facility] [address] [place] [address] [address] [address] AT [facility] [address]  [address] [place] [address]     Phone:  [phone]     triamcinolone 0.1 % cream                Primary Care Provider Office Phone # Fax #    Yeimy Corona [phone] [address]  [place] [address]        Equal Access to Services     [facility] : Hadii moose Lara, wagerada luqadaha, qaybta kaalhailee recinos, leigh staley. So [facility] [phone].    ATENCIÓN: Si habla español, tiene a underwood disposición servicios gratuitos de asistencia lingüística. Llame al [phone].    We comply with applicable federal civil rights  laws and [place] laws. We do not discriminate on the basis of race, color, national origin, age, disability, sex, sexual orientation, or gender identity.            Thank you!     Thank you for choosing [facility]  for your care. Our goal is always to provide you with excellent care. Hearing back from our patients is one way we can continue to improve our services. Please take a few minutes to complete the written survey that you may receive in the mail after your visit with us. Thank you!             Your Updated Medication List - Protect others around you: Learn how to safely use, store and throw away your medicines at www.disposemymeds.org.          This list is accurate as of 8/15/18  4:21 PM.  Always use your most recent med list.                   Brand Name Dispense Instructions for use Diagnosis    * albuterol 108 (90 Base) MCG/ACT inhaler    PROAIR HFA/PROVENTIL HFA/VENTOLIN HFA    1 Inhaler    Inhale 2 puffs into the lungs every 6 hours as needed for shortness of breath / dyspnea    Mild intermittent asthma without complication       * albuterol (2.5 MG/3ML) 0.083% neb solution     25 vial    Take 1 vial (2.5 mg) by nebulization every 6 hours as needed for shortness of breath / dyspnea or wheezing    Wheezing, Acute bronchitis, unspecified organism       budesonide-formoterol 160-4.5 MCG/ACT Inhaler    SYMBICORT    3 Inhaler    Inhale 2 puffs into the lungs 2 times daily    Mild persistent asthma without complication       buPROPion 150 MG 24 hr tablet    WELLBUTRIN XL    30 tablet    Take 1 tablet (150 mg) by mouth daily    Major depressive disorder, recurrent episode, moderate (H)       hydrOXYzine 25 MG tablet    ATARAX    60 tablet    Take 1-2 tablets (25-50 mg) by mouth every 6 hours as needed for anxiety (or insomnia)    Anxiety       sertraline 50 MG tablet    ZOLOFT    30 tablet    Take 1 tablet (50 mg) by mouth daily    Anxiety       triamcinolone 0.1 % cream    KENALOG    60 g     Apply sparingly to affected area TID until clear    Atopic dermatitis, unspecified type       * Notice:  This list has 2 medication(s) that are the same as other medications prescribed for you. Read the directions carefully, and ask your doctor or other care provider to review them with you.

## 2018-08-16 ASSESSMENT — PATIENT HEALTH QUESTIONNAIRE - PHQ9: SUM OF ALL RESPONSES TO PHQ QUESTIONS 1-9: 3

## 2018-08-16 ASSESSMENT — ANXIETY QUESTIONNAIRES: GAD7 TOTAL SCORE: 3

## 2018-08-23 DIAGNOSIS — J98.9 REACTIVE AIRWAY DISEASE THAT IS NOT ASTHMA: Primary | ICD-10-CM

## 2018-08-23 RX ORDER — BUDESONIDE AND FORMOTEROL FUMARATE DIHYDRATE 160; 4.5 UG/1; UG/1
2 AEROSOL RESPIRATORY (INHALATION) 2 TIMES DAILY
Qty: 1 INHALER | Refills: 1 | COMMUNITY
Start: 2018-08-23 | End: 2021-02-22

## 2018-08-23 RX ORDER — ALBUTEROL SULFATE 90 UG/1
2 AEROSOL, METERED RESPIRATORY (INHALATION) EVERY 6 HOURS PRN
Qty: 1 INHALER | Refills: 1 | COMMUNITY
Start: 2018-08-23 | End: 2021-02-22

## 2018-08-24 ENCOUNTER — TRANSFERRED RECORDS (OUTPATIENT)
Dept: HEALTH INFORMATION MANAGEMENT | Facility: CLINIC | Age: 28
End: 2018-08-24

## 2018-08-24 LAB — PHQ9 SCORE: 7

## 2018-08-28 ENCOUNTER — OFFICE VISIT (OUTPATIENT)
Dept: FAMILY MEDICINE | Facility: OTHER | Age: 28
End: 2018-08-28
Attending: NURSE PRACTITIONER
Payer: COMMERCIAL

## 2018-08-28 VITALS
RESPIRATION RATE: 14 BRPM | HEIGHT: 74 IN | HEART RATE: 68 BPM | OXYGEN SATURATION: 98 % | DIASTOLIC BLOOD PRESSURE: 86 MMHG | SYSTOLIC BLOOD PRESSURE: 136 MMHG | BODY MASS INDEX: 35.42 KG/M2 | TEMPERATURE: 97 F | WEIGHT: 276 LBS

## 2018-08-28 DIAGNOSIS — E06.3 HASHIMOTO'S THYROIDITIS: ICD-10-CM

## 2018-08-28 PROBLEM — R01.1 HEART MURMUR: Status: RESOLVED | Noted: 2017-11-20 | Resolved: 2018-08-28

## 2018-08-28 LAB
T3 SERPL-MCNC: 112 NG/DL (ref 60–181)
T3FREE SERPL-MCNC: 3.2 PG/ML (ref 2.3–4.2)
T4 FREE SERPL-MCNC: 0.91 NG/DL (ref 0.76–1.46)
TSH SERPL DL<=0.005 MIU/L-ACNC: 5.49 MU/L (ref 0.4–4)

## 2018-08-28 PROCEDURE — 99214 OFFICE O/P EST MOD 30 MIN: CPT | Performed by: NURSE PRACTITIONER

## 2018-08-28 PROCEDURE — 84439 ASSAY OF FREE THYROXINE: CPT | Performed by: NURSE PRACTITIONER

## 2018-08-28 PROCEDURE — 84443 ASSAY THYROID STIM HORMONE: CPT | Mod: 59 | Performed by: NURSE PRACTITIONER

## 2018-08-28 PROCEDURE — 36415 COLL VENOUS BLD VENIPUNCTURE: CPT | Performed by: NURSE PRACTITIONER

## 2018-08-28 PROCEDURE — 86376 MICROSOMAL ANTIBODY EACH: CPT | Mod: 90 | Performed by: NURSE PRACTITIONER

## 2018-08-28 PROCEDURE — 84481 FREE ASSAY (FT-3): CPT | Mod: 90 | Performed by: NURSE PRACTITIONER

## 2018-08-28 PROCEDURE — 99000 SPECIMEN HANDLING OFFICE-LAB: CPT | Performed by: NURSE PRACTITIONER

## 2018-08-28 ASSESSMENT — ANXIETY QUESTIONNAIRES
GAD7 TOTAL SCORE: 3
2. NOT BEING ABLE TO STOP OR CONTROL WORRYING: SEVERAL DAYS
1. FEELING NERVOUS, ANXIOUS, OR ON EDGE: SEVERAL DAYS
3. WORRYING TOO MUCH ABOUT DIFFERENT THINGS: SEVERAL DAYS
6. BECOMING EASILY ANNOYED OR IRRITABLE: NOT AT ALL
5. BEING SO RESTLESS THAT IT IS HARD TO SIT STILL: NOT AT ALL
7. FEELING AFRAID AS IF SOMETHING AWFUL MIGHT HAPPEN: NOT AT ALL

## 2018-08-28 ASSESSMENT — PATIENT HEALTH QUESTIONNAIRE - PHQ9: 5. POOR APPETITE OR OVEREATING: NOT AT ALL

## 2018-08-28 ASSESSMENT — PAIN SCALES - GENERAL: PAINLEVEL: NO PAIN (0)

## 2018-08-28 NOTE — PATIENT INSTRUCTIONS
ASSESSMENT/PLAN:     1. Hashimoto's thyroiditis  - TSH with free T4 reflex  - T3, total  - T3 Free  - Thyroid peroxidase antibody      For your new diagnosis of ADD, have your counselor at [facility] make arrangements for you to see psychiatry at [facility].          Yeimy Corona NP  [facility]

## 2018-08-28 NOTE — PROGRESS NOTES
SUBJECTIVE:   Kannan Ordonez is a 28 year old male who presents to clinic today for the following health issues:      Patient here for results - was seen at [facility]      Apparently his counselor told him he does not have depression but his symptoms are related to hashimoto's Thyroiditis - without lab testing, which is interesting.      She also diagnosed him with severe ADHD, and told him to come here for stimulant therapy.    We had a long discussion about his symptoms, and that I am most interested in having him see psychiatry for this condition.          PHQ-9 SCORE 7/6/2018 8/15/2018 8/28/2018   Total Score - - -   Total Score 12 3 6     ÁLVARO-7 SCORE 7/6/2018 8/15/2018 8/28/2018   Total Score 17 3 3           Problem list and histories reviewed & adjusted, as indicated.  Additional history: as documented    Patient Active Problem List   Diagnosis     ACP (advance care planning)     Vitamin D deficiency     Reactive airway disease that is not asthma     Hashimoto's thyroiditis     Past Surgical History:   Procedure Laterality Date     ORTHOPEDIC SURGERY  2015    r shoulder teat bone spur     ORTHOPEDIC SURGERY  2-2016    AC joint right     ORTHOPEDIC SURGERY  2016    Rt labrial tear     ORTHOPEDIC SURGERY  06/2017    revision decompression subpectoral biceps tenodesis        Social History   Substance Use Topics     Smoking status: Never Smoker     Smokeless tobacco: Never Used     Alcohol use Yes      Comment: occ     Family History   Problem Relation Age of Onset     Diabetes Mother      Hypertension Mother      Hyperlipidemia Mother      Coronary Artery Disease Father      Hyperlipidemia Father      Hypertension Father      Thyroid Disease No family hx of          Current Outpatient Prescriptions   Medication Sig Dispense Refill     albuterol (PROAIR HFA/PROVENTIL HFA/VENTOLIN HFA) 108 (90 Base) MCG/ACT inhaler Inhale 2 puffs into the lungs every 6 hours as needed for shortness of breath / dyspnea or  wheezing 1 Inhaler 1     budesonide-formoterol (SYMBICORT) 160-4.5 MCG/ACT Inhaler Inhale 2 puffs into the lungs 2 times daily 1 Inhaler 1     Allergies   Allergen Reactions     Morphine Sulfate Rash     Cats Other (See Comments)     Other reaction(s): Eye Irritation, Sneezing     Dogs Other (See Comments)     Other reaction(s): Eye Irritation, Sneezing     Morphine Hives     Recent Labs   Lab Test  08/28/18   1001  03/02/18   0902 11/17/17  10/24/17   1439  06/08/17   0922  05/15/17   1630  02/13/17   1020  11/07/16   1403   02/02/16   1519   08/28/15   1449   A1C   --    --    --   5.1   --    --    --    --    --    --    --    --    LDL   --    --    --    --    --   125*  107*  103*   < >   --    < >   --    HDL   --    --    --    --    --   62  46  47   < >   --    < >   --    TRIG   --    --    --    --    --   136  147  167*   < >   --    < >   --    ALT   --    --    --    --   82*   --    --    --    --   78*   --   51   CR   --    --   0.98  0.95  0.99  0.87  0.86  1.01   < >  1.19   < >  0.84   GFRESTIMATED   --    --    --   >90  >90  Non African American GFR Calc    >90  Non  GFR Calc    >90  Non  GFR Calc    89   < >  74   < >  >90  Non  GFR Calc     GFRESTBLACK   --    --    --   >90  >90  African American GFR Calc    >90   GFR Calc    >90   GFR Calc    >90   GFR Calc     < >  89   < >  >90   GFR Calc     POTASSIUM   --    --   3.7  3.9  4.3  3.7  4.6  3.9   < >  3.8   < >  4.1   TSH  5.49*  2.58   --   1.94   --   3.08  1.92  2.86   < >  4.41*   < >   --     < > = values in this interval not displayed.      BP Readings from Last 3 Encounters:   08/28/18 136/86   08/15/18 138/82   07/06/18 124/82    Wt Readings from Last 3 Encounters:   08/28/18 276 lb (125.2 kg)   08/15/18 269 lb (122 kg)   07/06/18 269 lb (122 kg)                  Labs reviewed in EPIC    Reviewed and updated as needed  "this visit by clinical staff  Tobacco  Allergies  Meds       Reviewed and updated as needed this visit by Provider         ROS:  Constitutional, HEENT, cardiovascular, pulmonary, gi and gu systems are negative, except as otherwise noted.    OBJECTIVE:     /86 (BP Location: Left arm, Patient Position: Sitting, Cuff Size: Adult Large)  Pulse 68  Temp 97  F (36.1  C) (Tympanic)  Resp 14  Ht 6' 2\" (1.88 m)  Wt 276 lb (125.2 kg)  SpO2 98%  BMI 35.44 kg/m2  Body mass index is 35.44 kg/(m^2).     GENERAL: healthy, alert and no distress  EYES: Eyes grossly normal to inspection, PERRL and conjunctivae and sclerae normal  NECK: no adenopathy, no asymmetry, masses, or scars and thyroid normal to palpation  RESP: lungs clear to auscultation - no rales, rhonchi or wheezes  CV: regular rate and rhythm, normal S1 S2, no S3 or S4, no murmur, click or rub, no peripheral edema and peripheral pulses strong  MS: no gross musculoskeletal defects noted, no edema  SKIN: no suspicious lesions or rashes  PSYCH: anxious, edgy, made a phone call to someone during our visit.     Results for orders placed or performed in visit on 08/28/18   TSH with free T4 reflex   Result Value Ref Range    TSH 5.49 (H) 0.40 - 4.00 mU/L   T3, total   Result Value Ref Range    Triiodothyronine (T3) 112 60 - 181 ng/dL   T3 Free   Result Value Ref Range    Free T3 3.2 2.3 - 4.2 pg/mL   Thyroid peroxidase antibody   Result Value Ref Range    Thyroid Peroxidase Antibody 206 (H) <35 IU/mL   T4 free   Result Value Ref Range    T4 Free 0.91 0.76 - 1.46 ng/dL       ASSESSMENT/PLAN:     1. Hashimoto's thyroiditis  - TSH with free T4 reflex  - T3, total  - T3 Free  - Thyroid peroxidase antibody      For your new diagnosis of ADD, have your counselor at [facility] make arrangements for you to see psychiatry at [facility].          Yeimy Corona NP  [facility]    "

## 2018-08-28 NOTE — NURSING NOTE
"Chief Complaint   Patient presents with     Results       Initial /86 (BP Location: Left arm, Patient Position: Sitting, Cuff Size: Adult Large)  Pulse 68  Temp 97  F (36.1  C) (Tympanic)  Resp 14  Ht 6' 2\" (1.88 m)  Wt 276 lb (125.2 kg)  SpO2 98%  BMI 35.44 kg/m2 Estimated body mass index is 35.44 kg/(m^2) as calculated from the following:    Height as of this encounter: 6' 2\" (1.88 m).    Weight as of this encounter: 276 lb (125.2 kg).  Medication Reconciliation: complete    Annalise Freeman LPN    "

## 2018-08-28 NOTE — MR AVS SNAPSHOT
After Visit Summary   8/28/2018    Kannan Ordonez    MRN: [id number]           Patient Information     Date Of Birth          1990        Visit Information        Provider Department      8/28/2018 9:30 AM Yeimy Corona, NP [facility]        Today's Diagnoses     Hashimoto's thyroiditis          Care Instructions        ASSESSMENT/PLAN:     1. Hashimoto's thyroiditis  - TSH with free T4 reflex  - T3, total  - T3 Free  - Thyroid peroxidase antibody      For your new diagnosis of ADD, have your counselor at [facility] make arrangements for you to see psychiatry at [facility].          Yeimy Corona NP  [facility]            Follow-ups after your visit        Your next 10 appointments already scheduled     Sep 04, 2018  3:00 PM CDT   (Arrive by 2:45 PM)   MR ELBOW RIGHT W/O CONTRAST with HIMR1   HI MRI ([facility] )    [address] [place] [id number] [phone]           Take your medicines as usual, unless your doctor tells you not to. Bring a list of your current medicines to your exam (including vitamins, minerals and over-the-counter drugs). Also bring the results of similar scans you may have had.  Please remove any body piercings and hair extensions before you arrive.  Follow your doctor s orders. If you do not, we may have to postpone your exam.  You may or may not receive IV contrast for this exam pending the discretion of the Radiologist.  You do not need to do anything special to prepare.  The MRI machine uses a strong magnet. Please wear clothes without metal (snaps, zippers). A sweatsuit works well, or we may give you a hospital gown.   **IMPORTANT** THE INSTRUCTIONS BELOW ARE ONLY FOR THOSE PATIENTS WHO HAVE BEEN PRESCRIBED SEDATION OR GENERAL ANESTHESIA DURING THEIR MRI PROCEDURE:  IF YOUR DOCTOR PRESCRIBED ORAL SEDATION (take medicine to help you relax during your exam):   You must get the medicine from your doctor (oral medication)  before you arrive. Bring the medicine to the exam. Do not take it at home. You ll be told when to take it upon arriving for your exam.   Arrive one hour early. Bring someone who can take you home after the test. Your medicine will make you sleepy. After the exam, you may not drive, take a bus or take a taxi by yourself.  IF YOUR DOCTOR PRESCRIBED IV SEDATION:   Arrive one hour early. Bring someone who can take you home after the test. Your medicine will make you sleepy. After the exam, you may not drive, take a bus or take a taxi by yourself.   No eating 6 hours before your exam. You may have clear liquids up until 4 hours before your exam. (Clear liquids include water, clear tea, black coffee and fruit juice without pulp.)  IF YOUR DOCTOR PRESCRIBED ANESTHESIA (be asleep for your exam):   Arrive 1 1/2 hours early. Bring someone who can take you home after the test. You may not drive, take a bus or take a taxi by yourself.   No eating 8 hours before your exam. You may have clear liquids up until 4 hours before your exam. (Clear liquids include water, clear tea, black coffee and fruit juice without pulp.)   You will spend four to five hours in the recovery room.  Please call the Imaging Department at your exam site with any questions.              Who to contact     If you have questions or need follow up information about today's clinic visit or your schedule please contact [facility] directly at [phone].  Normal or non-critical lab and imaging results will be communicated to you by MyChart, letter or phone within 4 business days after the clinic has received the results. If you do not hear from us within 7 days, please contact the clinic through [email or web address] or phone. If you have a critical or abnormal lab result, we will notify you by phone as soon as possible.  Submit refill requests through [employer] or call your pharmacy and they will forward the refill request to us. Please allow 3 business days  "for your refill to be completed.          Additional Information About Your Visit        Care EveryWhere ID     This is your Care EveryWhere ID. This could be used by other organizations to access your [facility] medical records  [id number]        Your Vitals Were     Pulse Temperature Respirations Height Pulse Oximetry BMI (Body Mass Index)    68 97  F (36.1  C) (Tympanic) 14 6' 2\" (1.88 m) 98% 35.44 kg/m2       Blood Pressure from Last 3 Encounters:   08/28/18 136/86   08/15/18 138/82   07/06/18 124/82    Weight from Last 3 Encounters:   08/28/18 276 lb (125.2 kg)   08/15/18 269 lb (122 kg)   07/06/18 269 lb (122 kg)              We Performed the Following     T3 Free     T3, total     Thyroid peroxidase antibody     TSH with free T4 reflex          Today's Medication Changes          These changes are accurate as of 8/28/18 10:00 AM.  If you have any questions, ask your nurse or doctor.               Stop taking these medicines if you haven't already. Please contact your care team if you have questions.     buPROPion 150 MG 24 hr tablet   Commonly known as:  WELLBUTRIN XL   Stopped by:  Yeimy Corona NP           hydrOXYzine 25 MG tablet   Commonly known as:  ATARAX   Stopped by:  Yeimy Corona NP           sertraline 50 MG tablet   Commonly known as:  ZOLOFT   Stopped by:  Yeimy Corona NP                    Primary Care Provider Office Phone # Fax #    Yeimy Corona [phone] [address]  [place] [facility] [address]        Equal Access to Services     [facility] AH: Hadii aad ku hadasho Soomaali, waaxda luqadaha, qaybta kaalmada adeegyada, leigh staley. So [facility] [place] [phone].    ATENCIÓN: Si habla español, tiene a underwood disposición servicios gratuitos de asistencia lingüística. Llame al [phone].    We comply with applicable federal civil rights laws and [place] laws. We do not discriminate on the basis of race, color, national origin, age, disability, " sex, sexual orientation, or gender identity.            Thank you!     Thank you for choosing [facility]  for your care. Our goal is always to provide you with excellent care. Hearing back from our patients is one way we can continue to improve our services. Please take a few minutes to complete the written survey that you may receive in the mail after your visit with us. Thank you!             Your Updated Medication List - Protect others around you: Learn how to safely use, store and throw away your medicines at www.disposemymeds.org.          This list is accurate as of 8/28/18 10:00 AM.  Always use your most recent med list.                   Brand Name Dispense Instructions for use Diagnosis    albuterol 108 (90 Base) MCG/ACT inhaler    PROAIR HFA/PROVENTIL HFA/VENTOLIN HFA    1 Inhaler    Inhale 2 puffs into the lungs every 6 hours as needed for shortness of breath / dyspnea or wheezing    Reactive airway disease that is not asthma       SYMBICORT 160-4.5 MCG/ACT Inhaler   Generic drug:  budesonide-formoterol     1 Inhaler    Inhale 2 puffs into the lungs 2 times daily    Reactive airway disease that is not asthma       triamcinolone 0.1 % cream    KENALOG    60 g    Apply sparingly to affected area TID until clear    Atopic dermatitis, unspecified type

## 2018-08-29 LAB — THYROPEROXIDASE AB SERPL-ACNC: 206 IU/ML

## 2018-08-29 ASSESSMENT — PATIENT HEALTH QUESTIONNAIRE - PHQ9: SUM OF ALL RESPONSES TO PHQ QUESTIONS 1-9: 6

## 2018-08-29 ASSESSMENT — ANXIETY QUESTIONNAIRES: GAD7 TOTAL SCORE: 3

## 2019-04-08 NOTE — TELEPHONE ENCOUNTER
New Rx signed  No pharmacy listed    Should FU for this in 1 month    Also, where is he with his groin injury and possible hernia??    [facility]  [phone]   Qvar Pending    Insurance response  Prescription Drug Insurance: [employer]  Notes: Prior authorization submitted - will update provider when decision has been made by insurance.

## 2019-04-26 ENCOUNTER — OFFICE VISIT (OUTPATIENT)
Dept: UROLOGY | Facility: OTHER | Age: 29
End: 2019-04-26
Attending: UROLOGY
Payer: COMMERCIAL

## 2019-04-26 VITALS
WEIGHT: 244 LBS | BODY MASS INDEX: 31.33 KG/M2 | DIASTOLIC BLOOD PRESSURE: 88 MMHG | HEART RATE: 72 BPM | SYSTOLIC BLOOD PRESSURE: 138 MMHG | RESPIRATION RATE: 16 BRPM

## 2019-04-26 DIAGNOSIS — R30.0 DYSURIA: ICD-10-CM

## 2019-04-26 DIAGNOSIS — R10.2 PELVIC PAIN IN MALE: Primary | ICD-10-CM

## 2019-04-26 LAB
ALBUMIN UR-MCNC: NEGATIVE MG/DL
APPEARANCE UR: CLEAR
BILIRUB UR QL STRIP: NEGATIVE
COLOR UR AUTO: YELLOW
GLUCOSE UR STRIP-MCNC: NEGATIVE MG/DL
HGB UR QL STRIP: NEGATIVE
KETONES UR STRIP-MCNC: NEGATIVE MG/DL
LEUKOCYTE ESTERASE UR QL STRIP: NEGATIVE
NITRATE UR QL: NEGATIVE
PH UR STRIP: 5.5 PH (ref 5–9)
SOURCE: NORMAL
SP GR UR STRIP: 1.01 (ref 1–1.03)
UROBILINOGEN UR STRIP-ACNC: 0.2 EU/DL (ref 0.2–1)

## 2019-04-26 PROCEDURE — 81003 URINALYSIS AUTO W/O SCOPE: CPT | Mod: ZL | Performed by: UROLOGY

## 2019-04-26 PROCEDURE — 99203 OFFICE O/P NEW LOW 30 MIN: CPT | Performed by: UROLOGY

## 2019-04-26 RX ORDER — DEXTROAMPHETAMINE SULFATE, DEXTROAMPHETAMINE SACCHARATE, AMPHETAMINE SULFATE AND AMPHETAMINE ASPARTATE 7.5; 7.5; 7.5; 7.5 MG/1; MG/1; MG/1; MG/1
CAPSULE, EXTENDED RELEASE ORAL
Refills: 0 | COMMUNITY
Start: 2019-04-18 | End: 2023-02-17 | Stop reason: ALTCHOICE

## 2019-04-26 ASSESSMENT — PAIN SCALES - GENERAL: PAINLEVEL: NO PAIN (0)

## 2019-04-26 NOTE — PROGRESS NOTES
Type of Visit  NPV    Chief Complaint  Testicular pain    HPI  Mr. Ordonez is a 29 year old male who presents with intermittent and chronic right testicular pain.  He also complains of right inguinal pressure and pelvic pressure.  He complains of lower sacral pain.  He also complains of lack of emission with ejaculation.  All of the symptoms started about 1.5 months ago.  He denies a specific injury.  He denies fevers or chills.    He was seen at a clinic in [place] and told he had torsion and an infection however no testing was done including a UA.  He was given a course of antibiotics and his symptoms did not improve      Past Medical History  He  has a past medical history of Anxiety (6/24/2016), Asthma, Hashimoto's thyroiditis (8/28/2018), Heart murmur (11/20/2017), Hyperlipidemia LDL goal <100 (12/16/2015), Hypertension (3/3/2015), Hypothyroidism (5/1/2015), Reactive airway disease that is not asthma (2/12/2018), Recurrent major depressive disorder (H) (6/24/2016), and Vitamin D deficiency (10/24/2017).  Patient Active Problem List   Diagnosis     ACP (advance care planning)     Vitamin D deficiency     Reactive airway disease that is not asthma     Hashimoto's thyroiditis       Past Surgical History  He  has a past surgical history that includes orthopedic surgery (2015); orthopedic surgery (2-2016); orthopedic surgery (2016); and orthopedic surgery (06/2017).    Medications  He has a current medication list which includes the following prescription(s): adderall xr, albuterol, and budesonide-formoterol.    Allergies  Allergies   Allergen Reactions     Morphine Sulfate Rash     Cats Other (See Comments)     Other reaction(s): Eye Irritation, Sneezing     Dogs Other (See Comments)     Other reaction(s): Eye Irritation, Sneezing     Morphine Hives       Social History  He  reports that he has never smoked. He uses smokeless tobacco. He reports that he drinks alcohol. He reports that he does not use drugs.  No  drug abuse.    Family History  Family History   Problem Relation Age of Onset     Diabetes Mother      Hypertension Mother      Hyperlipidemia Mother      Coronary Artery Disease Father      Hyperlipidemia Father      Hypertension Father      Thyroid Disease No family hx of        Review of Systems  I personally reviewed the ROS with the patient.    Nursing Notes:   Bonnie Freitas LPN  4/26/2019  9:23 AM  Signed  Pt presents to clinic for consult for right testicular pain, was seen at [facility] in [place] in March was prescribed antibiotic  Review of Systems:    Weight loss:    No     Recent fever/chills:  No   Night sweats:   No  Current skin rash:  No   Recent hair loss:  No  Heat intolerance:  No   Cold intolerance:  No  Chest pain:   No   Palpitations:   No  Shortness of breath:  No   Wheezing:   No  Constipation:    No   Diarrhea:   No   Nausea:   No   Vomiting:   No   Kidney/side pain:  No   Back pain:   Yes  Frequent headaches:  No   Dizziness:     No  Leg swelling:   No   Calf pain:    No    Parents, brothers or sisters with history of kidney cancer:   No  Parents, brothers or sisters with history of bladder cancer: No  Father or brother with history of prostate cancer:  No      Physical Exam  Vitals:    04/26/19 0918   BP: 138/88   BP Location: Left arm   Patient Position: Sitting   Cuff Size: Adult Regular   Pulse: 72   Resp: 16   Weight: 110.7 kg (244 lb)     Constitutional: No acute distress.  Alert and cooperative   Head: NCAT  Eyes: Conjunctivae normal  Cardiovascular: Regular rate.  Pulmonary/Chest: Respirations are even and non-labored bilaterally, no audible wheezing  Abdominal: Soft. No distension, tenderness, masses or guarding.   Neurological: A + O x 3.  Cranial Nerves II-XII grossly intact.  Extremities: SHARON x 4, Warm. No clubbing.  No cyanosis.    Skin: Pink, warm and dry.  No visible rashes noted.  Psychiatric:  Normal mood and affect  Back:  No left CVA tenderness.  No right CVA  tenderness.  Genitourinary:  Nonpalpable bladder  Normal male phallus without discharge or lesions, normal pubic hair distribution.    Testicles descended bilaterally.    Labs  Results for AFUA ORDONEZ (MRN [id number]) as of 4/26/2019 10:03   4/26/2019 09:47   Color Urine Yellow   Appearance Urine Clear   Glucose Urine Negative   Bilirubin Urine Negative   Ketones Urine Negative   Specific Gravity Urine 1.010   pH Urine 5.5   Protein Albumin Urine Negative   Urobilinogen Urine 0.2   Nitrite Urine Negative   Blood Urine Negative   Leukocyte Esterase Urine Negative   Source Midstream Urine       Assessment  Mr. Ordonez is a 29 year old male who presents with right testicular pain.    I reassured the patient that his exam is normal.  The urinalysis from today is normal.  He does not have torsion or orchitis.  No hernia on exam.  Unfortunately I think he has developed a significant amount of anxiety that has contributed to his symptoms.    Plan  Scrotal support continuously, especially with increased activity.  Warm bath soaks twice daily.  NSAIDs for pain as instructed with food.  Follow-up as needed

## 2019-04-26 NOTE — NURSING NOTE
Pt presents to clinic for consult for right testicular pain, was seen at [facility] in [place] in March was prescribed antibiotic  Review of Systems:    Weight loss:    No     Recent fever/chills:  No   Night sweats:   No  Current skin rash:  No   Recent hair loss:  No  Heat intolerance:  No   Cold intolerance:  No  Chest pain:   No   Palpitations:   No  Shortness of breath:  No   Wheezing:   No  Constipation:    No   Diarrhea:   No   Nausea:   No   Vomiting:   No   Kidney/side pain:  No   Back pain:   Yes  Frequent headaches:  No   Dizziness:     No  Leg swelling:   No   Calf pain:    No    Parents, brothers or sisters with history of kidney cancer:   No  Parents, brothers or sisters with history of bladder cancer: No  Father or brother with history of prostate cancer:  No

## 2019-06-30 ENCOUNTER — TRANSFERRED RECORDS (OUTPATIENT)
Dept: HEALTH INFORMATION MANAGEMENT | Facility: CLINIC | Age: 29
End: 2019-06-30

## 2020-02-24 ENCOUNTER — HOSPITAL ENCOUNTER (EMERGENCY)
Facility: HOSPITAL | Age: 30
Discharge: HOME | End: 2020-02-24
Attending: NURSE PRACTITIONER | Admitting: NURSE PRACTITIONER
Payer: COMMERCIAL

## 2020-02-24 VITALS
RESPIRATION RATE: 16 BRPM | WEIGHT: 245 LBS | HEIGHT: 73 IN | OXYGEN SATURATION: 98 % | DIASTOLIC BLOOD PRESSURE: 86 MMHG | SYSTOLIC BLOOD PRESSURE: 152 MMHG | HEART RATE: 82 BPM | BODY MASS INDEX: 32.47 KG/M2 | TEMPERATURE: 97.4 F

## 2020-02-24 DIAGNOSIS — J06.9 URI (UPPER RESPIRATORY INFECTION): ICD-10-CM

## 2020-02-24 LAB
SPECIMEN SOURCE: NORMAL
STREP GROUP A PCR: NOT DETECTED

## 2020-02-24 PROCEDURE — 87651 STREP A DNA AMP PROBE: CPT | Performed by: NURSE PRACTITIONER

## 2020-02-24 PROCEDURE — G0463 HOSPITAL OUTPT CLINIC VISIT: HCPCS | Mod: 25

## 2020-02-24 PROCEDURE — 25000125 ZZHC RX 250: Performed by: NURSE PRACTITIONER

## 2020-02-24 PROCEDURE — 94640 AIRWAY INHALATION TREATMENT: CPT

## 2020-02-24 PROCEDURE — 40000275 ZZH STATISTIC RCP TIME EA 10 MIN

## 2020-02-24 PROCEDURE — 99213 OFFICE O/P EST LOW 20 MIN: CPT | Mod: Z6 | Performed by: NURSE PRACTITIONER

## 2020-02-24 RX ORDER — IPRATROPIUM BROMIDE AND ALBUTEROL SULFATE 2.5; .5 MG/3ML; MG/3ML
3 SOLUTION RESPIRATORY (INHALATION) ONCE
Status: COMPLETED | OUTPATIENT
Start: 2020-02-24 | End: 2020-02-24

## 2020-02-24 RX ORDER — PREDNISONE 20 MG/1
TABLET ORAL
Qty: 5 TABLET | Refills: 0 | Status: SHIPPED | OUTPATIENT
Start: 2020-02-24 | End: 2020-03-02

## 2020-02-24 RX ADMIN — IPRATROPIUM BROMIDE AND ALBUTEROL SULFATE 3 ML: .5; 3 SOLUTION RESPIRATORY (INHALATION) at 14:38

## 2020-02-24 ASSESSMENT — ENCOUNTER SYMPTOMS
FEVER: 0
HEADACHES: 1
SHORTNESS OF BREATH: 1
NAUSEA: 0
EYES NEGATIVE: 1
COUGH: 1
RHINORRHEA: 1
MYALGIAS: 0
VOMITING: 0
SORE THROAT: 1
ACTIVITY CHANGE: 1
CHILLS: 0
DIARRHEA: 0

## 2020-02-24 ASSESSMENT — MIFFLIN-ST. JEOR: SCORE: 2125.19

## 2020-02-24 NOTE — DISCHARGE INSTRUCTIONS
Will call you with results of strep    Increase oral intake, cool mist vaporizer as needed, rest, avoid sharing utensils, practice good hand washing techniques, cover mouth when you cough and sneeze. Throw toothbursh away 24 hours after starting antibiotics.  Over the counter medications such as ibuprofen and/or acetaminophen for fever and generalized aches and pains. Ibuprofen 400 to 800 mg (2 - 4 tabs of over the counter med) every six to eight hours as needed;not to exceed maximum amount of 3200 mg in 24 hours.Tylenol 650 to 1000 mg every four to six hours as needed (not to exceed more than 4000 mg in a 24 hour period). May use interchangeably. Robitussin (guaifenesin) for cough. Chest rubs such as Paulo's or Mentholatum may help reduce sore throat symptoms.  Chloraseptic spray for sore throat or menthol lozenges may be helpful for sore throat. Be reevaluated if symptoms persist longer than 10 - 14 days or worsen and if there is no improvement in 72 hours or worsening of symptoms.  Increase fluids. Complete all antibiotics even if feeling better. Taking antibiotics with food may decrease the stomach upset that can occur when taking antibiotics. Antibiotics frequently cause diarrhea. Probiotics or yogurt may help prevent or decrease these symptoms.     OTC decongestants (oral or topical).  Decongestants (oral or topical) cause vasoconstriction of the nasal mucosa.  We prefer oral pseudoephedrine to phenylephrine and other oral OTC nasal decongestants. Side effects of oral decongestants may include tachycardia, elevated diastolic blood pressure, and palpitations. Pseudoephedrine 30 to 60 mg every four to six hours as needed for congestion. (Maximum dose is 240 mg in 24 hours). Do not use longer than 72 hours.    Commonly used topical decongestants include oxymetazoline, xylometazoline, and phenylephrine. Side effects of topical decongestants include nosebleeds and drying of the nasal membranes. Topical decongestants  should only be used for two to three days; longer use may result in rebound nasal congestion after discontinuation.    Fluids, herbs, and foods for sore throat relief -- Adjusting the temperature and texture of foods and beverages may provide local relief of sore throat pain. While data showing benefit are quite limited, these approaches are intuitive. We typically advise these measures since they are likely to be safe with minimal adverse effect, and patients often describe relief of symptoms.  We suggest hydration with frozen (eg, ice or popsicles) or heated liquids (eg, teas, soups), rather than room temperature or refrigerated fluids in patient with significant sore throat pain. Very cold foods can have a numbing-like effect that temporarily reduces or alleviates the pain of swallowing. Ice cubes or frozen popsicles facilitate hydration; ice cream and frozen yogurt provide caloric intake.  Warm fluids and foods, including teas, soups, and soft non-irritating foods, may be better tolerated by patients with throat pain than irritating foods (eg, rough-textured or spicy foods) or fluids at room temperatures. Foods that coat the throat, including honey and hard candies, can facilitate intake of calories while temporarily relieving throat pain.

## 2020-02-24 NOTE — ED TRIAGE NOTES
RESPIRATORY SYMPTOMS      Duration: ongoing 5-6 days    Description  nasal congestion, sore throat, cough, headache and SOB    Severity: mild    Accompanying signs and symptoms: pt reports its just hard to breathe    History (predisposing factors):  asthma    Therapies tried and outcome:  Pt reports trying neb at 2 am today only helped for a little bit.

## 2020-02-24 NOTE — ED PROVIDER NOTES
History     Chief Complaint   Patient presents with     Shortness of Breath     Using nebs BID at home, out of inhaler. Increased SOB after cold sx     HPI  Kannan Ordonez is a 30 year old male who presents with a five to six day history of congestion, runny nose, sore throat, cough, shortness of breath and headaches. He has taken an albuterol neb around 2 pm today that did help to decrease his shortness of breath.  He has a history of asthma. Unknown sick contacts. Uses smokeless tobacco. Denies fevers, chills, nausea, vomiting, and diarrhea.      RESPIRATORY SYMPTOMS       Duration: ongoing 5-6 days    Description  nasal congestion, sore throat, cough, headache and SOB    Severity: mild    Accompanying signs and symptoms: pt reports its just hard to breathe    History (predisposing factors):  asthma    Therapies tried and outcome:  Pt reports trying neb at 2 am today only helped for a little bit.     Allergies:  Allergies   Allergen Reactions     Morphine Sulfate Rash     Cats Other (See Comments)     Other reaction(s): Eye Irritation, Sneezing     Dogs Other (See Comments)     Other reaction(s): Eye Irritation, Sneezing     Morphine Hives       Problem List:    Patient Active Problem List    Diagnosis Date Noted     Hashimoto's thyroiditis 08/28/2018     Priority: Medium     Reactive airway disease that is not asthma 02/12/2018     Priority: Medium     Vitamin D deficiency 10/24/2017     Priority: Medium     ACP (advance care planning) 05/26/2016     Priority: Medium     Advance Care Planning 5/26/2016: ACP Review of Chart / Resources Provided:  Reviewed chart for advance care plan.  Kannan Ordonez has been provided information and resources to begin or update their advance care plan.  Added by ELVA IBARRA                Past Medical History:    Past Medical History:   Diagnosis Date     Anxiety 6/24/2016     Asthma      Hashimoto's thyroiditis 8/28/2018     Heart murmur 11/20/2017      "Hyperlipidemia LDL goal <100 12/16/2015     Hypertension 3/3/2015     Hypothyroidism 5/1/2015     Reactive airway disease that is not asthma 2/12/2018     Recurrent major depressive disorder (H) 6/24/2016     Vitamin D deficiency 10/24/2017       Past Surgical History:    Past Surgical History:   Procedure Laterality Date     ORTHOPEDIC SURGERY  2015    r shoulder teat bone spur     ORTHOPEDIC SURGERY  2-2016    AC joint right     ORTHOPEDIC SURGERY  2016    Rt labrial tear     ORTHOPEDIC SURGERY  06/2017    revision decompression subpectoral biceps tenodesis        Family History:    Family History   Problem Relation Age of Onset     Diabetes Mother      Hypertension Mother      Hyperlipidemia Mother      Coronary Artery Disease Father      Hyperlipidemia Father      Hypertension Father      Thyroid Disease No family hx of        Social History:  Marital Status:  Single [1]  Social History     Tobacco Use     Smoking status: Never Smoker     Smokeless tobacco: Current User   Substance Use Topics     Alcohol use: Yes     Comment: occ     Drug use: No        Medications:    ADDERALL XR 30 MG 24 hr capsule  albuterol (PROAIR RESPICLICK) 108 (90 Base) MCG/ACT inhaler  predniSONE (DELTASONE) 20 MG tablet  albuterol (PROAIR HFA/PROVENTIL HFA/VENTOLIN HFA) 108 (90 Base) MCG/ACT inhaler  budesonide-formoterol (SYMBICORT) 160-4.5 MCG/ACT Inhaler          Review of Systems   Constitutional: Positive for activity change. Negative for chills and fever.   HENT: Positive for congestion, rhinorrhea and sore throat.    Eyes: Negative.    Respiratory: Positive for cough and shortness of breath.    Gastrointestinal: Negative for diarrhea, nausea and vomiting.   Musculoskeletal: Negative for myalgias.   Neurological: Positive for headaches.       Physical Exam   BP: 152/86  Pulse: 82  Temp: 97.4  F (36.3  C)  Resp: 16  Height: 185.4 cm (6' 1\")  Weight: 111.1 kg (245 lb)  SpO2: 98 %      Physical Exam  Vitals signs and nursing note " reviewed.   Constitutional:       General: He is in acute distress.      Appearance: He is well-developed.   HENT:      Head: Normocephalic.      Right Ear: Tympanic membrane and ear canal normal.      Left Ear: Tympanic membrane and ear canal normal.      Nose: Nose normal.      Right Sinus: No maxillary sinus tenderness or frontal sinus tenderness.      Left Sinus: No maxillary sinus tenderness or frontal sinus tenderness.      Mouth/Throat:      Lips: Pink.      Mouth: Mucous membranes are moist.      Pharynx: Uvula midline. Posterior oropharyngeal erythema (mild) present.   Eyes:      Conjunctiva/sclera: Conjunctivae normal.   Cardiovascular:      Rate and Rhythm: Regular rhythm.      Heart sounds: Normal heart sounds. No murmur.   Pulmonary:      Effort: Pulmonary effort is normal. No respiratory distress.      Breath sounds: Normal breath sounds. No wheezing.   Lymphadenopathy:      Cervical: Cervical adenopathy (mild) present.      Right cervical: Superficial cervical adenopathy present.      Left cervical: Superficial cervical adenopathy present.   Skin:     General: Skin is warm.   Neurological:      Mental Status: He is alert and oriented to person, place, and time.   Psychiatric:         Behavior: Behavior normal.         ED Course        Procedures             No results found for this or any previous visit (from the past 24 hour(s)).    Medications   ipratropium - albuterol 0.5 mg/2.5 mg/3 mL (DUONEB) neb solution 3 mL (3 mLs Nebulization Given 2/24/20 4458)       Assessments & Plan (with Medical Decision Making)     I have reviewed the nursing notes.    I have reviewed the findings, diagnosis, plan and need for follow up with the patient.  (J06.9) URI (upper respiratory infection)    Comment: 30 year old male who presents with a five to six day history of congestion, runny nose, sore throat, cough, shortness of breath and headaches. He has taken an albuterol neb around 2 pm today that did help to  decrease his shortness of breath.  He has a history of asthma. Unknown sick contacts. Uses smokeless tobacco. Denies fevers, chills, nausea, vomiting, and diarrhea.    Assessment negative except mild anterior cervical adenopathy and posterior oropharyngeal erythema.  Lungs clear, O2 sats 98% on room air. History of asthma  Felt better after duo neb.  Strep screen negative and he was notified of these results.    Plan: albuterol as needed every six hours, and decreasing dose of prednisone. Verbally instructed on these medications. Education provided for URI.  Treat symptoms conservatively with acetaminophen and  ibuprofen (if applicable) for fevers, body aches, and headaches, guaifenesin and/or honey for cough. May use chest rubs for sore throat and congestion, hot and cold liquids may help decrease sore throat and help you feel better. Increase fluids. You may utilize pseudoephedrine for congestion. Return to be reevaluated by ER/UC or your primary care provider if symptoms worsen, you develop breathing difficulties, or you do not improve in a reasonable time frame. It can take several days for a cough to resolve. It can take ten to fourteen days for upper respiratory symptoms to resolve.   These discharge instructions and medications were reviewed with him and understanding verbalized.    Discharge Medication List as of 2/24/2020  2:30 PM          Final diagnoses:   URI (upper respiratory infection)       2/24/2020   [facility]       Lulu Erazo, GILA  02/27/20 3251

## 2020-02-24 NOTE — ED AVS SNAPSHOT
[facility]  [address]  [place] [address]  Phone:  [phone]                                    Kannan Ordonez   MRN: [id number]    Department:  [facility]   Date of Visit:  2/24/2020           After Visit Summary Signature Page    I have received my discharge instructions, and my questions have been answered. I have discussed any challenges I see with this plan with the nurse or doctor.    ..........................................................................................................................................  Patient/Patient Representative Signature      ..........................................................................................................................................  Patient Representative Print Name and Relationship to Patient    ..................................................               ................................................  Date                                   Time    ..........................................................................................................................................  Reviewed by Signature/Title    ...................................................              ..............................................  Date                                               Time          22EPIC Rev 08/18

## 2020-03-30 ENCOUNTER — TELEPHONE (OUTPATIENT)
Dept: FAMILY MEDICINE | Facility: OTHER | Age: 30
End: 2020-03-30

## 2020-03-30 NOTE — TELEPHONE ENCOUNTER
Patient called requesting a work note to be released from work due to his Asthma and increased risk for Covid-19. He works for [employer]. Please Advise. Ashley A. Lechevalier, LPN on 3/30/2020 at 8:19 AM

## 2020-03-30 NOTE — TELEPHONE ENCOUNTER
An asthma diagnosis is not an exclusion from work within the COVID guidelines  If he is symptomatic, route him through the COVID screening line.    [facility]  [phone]

## 2020-03-31 ENCOUNTER — TELEPHONE (OUTPATIENT)
Dept: FAMILY MEDICINE | Facility: OTHER | Age: 30
End: 2020-03-31

## 2020-03-31 ASSESSMENT — ASTHMA QUESTIONNAIRES
ACT_TOTALSCORE: 17
QUESTION_3 LAST FOUR WEEKS HOW OFTEN DID YOUR ASTHMA SYMPTOMS (WHEEZING, COUGHING, SHORTNESS OF BREATH, CHEST TIGHTNESS OR PAIN) WAKE YOU UP AT NIGHT OR EARLIER THAN USUAL IN THE MORNING: ONCE OR TWICE
EMERGENCY_ROOM_LAST_YEAR_TOTAL: TWO
QUESTION_2 LAST FOUR WEEKS HOW OFTEN HAVE YOU HAD SHORTNESS OF BREATH: ONCE OR TWICE A WEEK
QUESTION_5 LAST FOUR WEEKS HOW WOULD YOU RATE YOUR ASTHMA CONTROL: SOMEWHAT CONTROLLED
QUESTION_1 LAST FOUR WEEKS HOW MUCH OF THE TIME DID YOUR ASTHMA KEEP YOU FROM GETTING AS MUCH DONE AT WORK, SCHOOL OR AT HOME: A LITTLE OF THE TIME
QUESTION_4 LAST FOUR WEEKS HOW OFTEN HAVE YOU USED YOUR RESCUE INHALER OR NEBULIZER MEDICATION (SUCH AS ALBUTEROL): ONE OR TWO TIMES PER DAY

## 2020-03-31 NOTE — TELEPHONE ENCOUNTER
Patient works for [employer]. He does have Dx of asthma. Multiple people at his job are getting pulled from their doctor due to them having a dx of asthma. He denies symptoms but concerned with a little one at home.    Would like to discuss the possible work excuse again. 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    WILMA HELMS LPN

## 2020-03-31 NOTE — TELEPHONE ENCOUNTER
Please update [facility] via phone  OK to prepare and print letter to excuse from work due to Reactive Airway Disease   I will sign when printed    [facility]  [phone]

## 2020-04-01 ASSESSMENT — ASTHMA QUESTIONNAIRES: ACT_TOTALSCORE: 17

## 2020-04-02 ENCOUNTER — VIRTUAL VISIT (OUTPATIENT)
Dept: FAMILY MEDICINE | Facility: OTHER | Age: 30
End: 2020-04-02

## 2020-04-02 NOTE — PROGRESS NOTES
"Date: 2020 09:11:48  Clinician: Fredo Lagunas  Clinician NPI: [id number]  Patient: Krzysztof Ordonez  Patient : 1990  Patient Address: [address] [place] [address]  Patient Phone: [phone]  Visit Protocol: URI  Patient Summary:  Krzysztof is a 30 year old ( : 1990 ) male who initiated a Visit for COVID-19 (Coronavirus) evaluation and screening. When asked the question \"Please sign me up to receive news, health information and promotions. \", Krzysztof responded \"Yes\".    Krzysztof states his symptoms started today.   His symptoms consist of a headache, wheezing, nasal congestion, malaise, and rhinitis. He is experiencing mild difficulty breathing with activities but can speak normally in full sentences.   Symptom details     Nasal secretions: The color of his mucus is white.    Wheezing: Krzysztof has been diagnosed with asthma. The wheezing interferes with his normal daily activities.    Headache: He states the headache is mild (1-3 on a 10 point pain scale).      Krzysztof denies having teeth pain, fever, facial pain or pressure, myalgias, chills, sore throat, cough, ear pain, and enlarged lymph nodes. He also denies taking antibiotic medication for the symptoms and having recent facial or sinus surgery in the past 60 days.    Pertinent COVID-19 (Coronavirus) information  Krzysztof has not traveled internationally or to the areas where COVID-19 (Coronavirus) is widespread, including cruise ship travel in the last 14 days before the start of his symptoms.   Krzysztof has not had a close contact with a laboratory-confirmed COVID-19 patient within 14 days of symptom onset. He also has not had a close contact with a suspected COVID-19 patient within 14 days of symptom onset.   Krzysztof is not a healthcare worker or a  and does not work in a healthcare facility. He does not live with a healthcare worker.   Triage Point(s) temporarily suspended for COVID-19 (Coronavirus) screening  Krzysztof reported the following " symptoms which were previously protocol referral points. These protocol referral points have temporarily been removed for purposes of COVID-19 (Coronavirus) screening.   Wheezing that keeps Krzysztof from doing daily activities   Pertinent medical history  Krzysztof does not need a return to work/school note.   Weight: 230 lbs   Krzysztof smokes or uses smokeless tobacco.   Weight: 230 lbs  A synchronous phone visit was initiated by the provider for the following reason: wheezing, sob hx of asthma    MEDICATIONS: No current medications, ALLERGIES: NKDA  Clinician Response:  Dear Krzysztof,   Please use Symbicort 2 puffs in AM and 2 puffs in PM for the next 2 weeks at least. Talk to your primary care doctor after that.&nbsp;     Use albuterol inhaler as needed for your shortness of breath   Based on the information you have provided, you do have symptoms that are consistent with Coronavirus (COVID-19).   The coronavirus causes mild to severe respiratory illness with the most common symptoms including fever, cough and difficulty breathing. Unfortunately, many viruses cause similar symptoms and it can be difficult to distinguish between viruses, especially in mild cases, so we are presuming that anyone with cough or fever has coronavirus at this time.  Coronavirus/COVID-19 has reached the point of community spread in [place], meaning that we are finding the virus in people with no known exposure risk for damon the virus. Given the increasing commonness of coronavirus in the community we are no longer testing patients who are not critically ill.  If you are a health care worker, you should refer to your employee health office for instructions about testing and returning to work.  For everyone else who has cough or fever, you should assume you are infected with coronavirus. Since you will not be tested but have symptoms that may be consistent with coronavirus, the CDC recommends you stay in self-isolation until these three  things have happened:    You have had no fever for at least 72 hours (that is three full days of no fever without the use of medicine that reduces fevers)    AND   Other symptoms have improved (for example, when your cough or shortness of breath have improved)   AND   At least 7 days have passed since your symptoms first appeared.   How to Isolate:    Isolate yourself at home.   Do Not allow any visitors  Do Not go to work or school  Do Not go to Mosque,  centers, shopping, or other public places.  Do Not shake hands.  Avoid close contact with others (hugging, kissing).   Protect Others:    Cover Your Mouth and Nose with a mask, disposable tissue or wash cloth to avoid spreading germs to others.  Wash your hands and face frequently with soap and water.   Managing Symptoms:    At this time, we primarily recommend Tylenol (Acetaminophen) for fever or pain. If you have liver or kidney problems, contact your primary care provider for instructions on use of tylenol. Adults can take 650 mg (two 325 mg pills) by mouth every 4-6 hours as needed OR 1,000 mg (two 500 mg pills) every 8 hours as needed. MAXIMUM DAILY DOSE: 3,000mg. For children, refer to dosing on bottle based on age or weight.   If you develop significant shortness of breath that prevents you from doing normal activities, please call 911 or proceed to the nearest emergency room and alert them immediately that you have been in self-isolation for possible coronavirus.   If you have a higher risk medical condition such as cancer, heart failure, end stage renal disease on dialysis or have a transplant, please reach out to your specialist's clinic to advise them of your [facility] visit should you not improve within the next two days.  For more information about COVID19 and options for caring for yourself at home, please visit the CDC website at https://www.cdc.gov/coronavirus/2019-ncov/about/steps-when-sick.htmlFor more options for care at [facility],  please visit our website at [email or web address]       COVID-19 (Coronavirus) General Information  With the increase in the number of COVID-19 (Coronavirus) cases, we understand you may have some questions. Below is some helpful information on COVID-19 (Coronavirus).  How can I protect myself and others from the COVID-19 (Coronavirus)?  Because there is currently no vaccine to prevent infection, the best way to protect yourself is to avoid being exposed to this virus. Put distance between yourself and other people if COVID-19 (Coronavirus) is spreading in your community. The virus is thought to spread mainly from person-to-person.     Between people who are in close contact with one another (within about 6 about) for a prolonged period (10 minutes or longer).    Through respiratory droplets produced when an infected person coughs or sneezes.     The CDC recommends the following additional steps to protect yourself and others:     Wash your hands often with soap and water for at least 20 seconds, especially after blowing your nose, coughing, or sneezing; going to the bathroom; and before eating or preparing food.  Use an alcohol-based hand  that contains at least 60 percent alcohol if soap and water are not available.        Avoid touching your eyes, nose and mouth with unwashed hands.    Avoid close contact with people who are sick.    Stay home when you are sick.    Cover your cough or sneeze with a tissue, then throw the tissue in the trash.    Clean and disinfect frequently touched objects and surfaces.     You can help stop COVID-19 (Coronavirus) by knowing the signs and symptoms:     Fever    Cough    Shortness of breath     Contact your healthcare provider if   Develop symptoms   AND   Have been in close contact with a person known to have COVID-19 (Coronavirus) or live in or have recently traveled from an area with ongoing spread of COVID-19 (Coronavirus). Call ahead  before you go to a doctor's office or emergency room. Tell them about your recent travel and your symptoms.   For the most up to date information, visit the CDC's website.  Self-monitoring  Self-monitoring means people should monitor themselves for fever by taking their temperatures twice a day and remain alert for a cough or difficulty breathing.  It is important to check your health two times each day for 14 days after a potential exposure to a person with COVID-19 (Coronavirus) or after travel from a location where COVID-19 (Coronavirus) is widespread. If you have been exposed to a person with COVID-19 (Coronavirus), it may take up to 14 days to know if you will get sick. Follow the steps below to check and record your health.     Take your temperature with a thermometer twice a day, once in the morning and once in the evening, and watch for a cough or difficulty breathing for 14 days.    Write down your temperature and any COVID-19 symptoms you may have: feeling feverish, coughing, or difficulty breathing.    Stay home from work or school.    Do not take public transportation, taxis, or ride-shares.    Avoid crowded places (such as shopping centers and movie theaters) and limit your activities in public.    Keep your distance from others (about 6 feet or 2 meters).    If you get sick with fever, cough, or trouble breathing, contact your healthcare provider and tell them about your recent travel and/or your symptoms.    If you need to seek medical care for other reasons, such as dialysis, call ahead to your doctor and tell them about your recent travel.     Steps to help prevent the spread of COVID-19 (Coronavirus) if you are sick  If you are sick with COVID-19 (Coronavirus) or suspect you are infected with the virus that causes COVID-19 (Coronavirus), follow the steps below to help prevent the disease from spreading&nbsp;to people in your home and community.     Stay home except to get medical care. Home  "isolation may be started in consultation with your healthcare clinician.    Separate yourself from other people and animals in your home.    Call ahead before visiting your doctor if you have a medical appointment.    Wear a facemask when you are around other people.    Cover your cough and sneezes.    Clean your hands often.    Avoid sharing personal household items.    Clean and disinfect frequently touched objects and surfaces everyday.    You will need to have someone drop off medications or household supplies (if needed) at your house without coming inside or in contact with you or others living in your house.    Monitor your symptoms and seek prompt medical care if your illness is worsening (e.g. Difficulty breathing).    Discontinue home isolation only in consultation with your healthcare provider.     For more detailed and up to date information on what to do if you are sick, visit this link: What to Do If You Are Sick With COVID-19.  Do I need to be tested for COVID-19 (Coronavirus)?     Not everyone needs to be tested for COVID-19 (Coronavirus). Decisions on which patients receive testing will be based on the local spread of COVID-19 (Coronavirus) as well as the symptoms. Your healthcare provider will make the final decision on whether you should be tested.    In the meantime, if you have concerns that you may have been exposed, it is reasonable to practice \"social distancing.\"&nbsp; If you are ill with a cold or flu-like illness, please monitor your symptoms and call your healthcare provider if your symptoms worsen.    For more up to date information, visit this link: COVID-19 (Coronavirus) Frequently Asked Questions and Answers.      Diagnosis: Wheezing  Diagnosis ICD: R06.2  Triage Notes: I reviewed the patient's history, verified their identity, and explained the Visit process.    30 yoM with hx of asthma    hx of recent ED visit for viral URI on 2/24/20. given albuterol and prednisone taper of 20 mg: " "1 tab daily for 3 days, then 1/2 tab daily for 4 days.    called for clarification of wheezing/sob: symptoms started today  doesn't have seasonal allergies  does feel short of breath when walking up steps but otherwise breathing normally  albuterol neb - 1 hour ago. took 2 neb tx yesterday.   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 lives with wife who has no symptoms yet. wife works as . cousin was over last week and has same symptoms.  Synchronous Triage: phone, status: completed, duration: 1072 seconds  Prescription: budesonide-formoterol (Symbicort) 160-4.5 mcg/actuation inhalation HFA aerosol inhaler 1 60 inhalation aerosol with adapter, 0 days supply. Inhale 2 puffs 2 times per day. Refills: 0, Refill as needed: no, Allow substitutions: yes  Addendum created: April 02 11:59:12, 2020 created by: Ismael Bond body: I reviewed the e-visit and discussed patient with the resident. I agree with the resident's assessment and plan of care as documented.  Ismael Bond MD  "

## 2020-12-20 ENCOUNTER — HEALTH MAINTENANCE LETTER (OUTPATIENT)
Age: 30
End: 2020-12-20

## 2021-01-07 ENCOUNTER — OFFICE VISIT (OUTPATIENT)
Dept: CHIROPRACTIC MEDICINE | Facility: OTHER | Age: 31
End: 2021-01-07
Attending: CHIROPRACTOR
Payer: COMMERCIAL

## 2021-01-07 DIAGNOSIS — M54.2 CERVICALGIA: ICD-10-CM

## 2021-01-07 DIAGNOSIS — M99.02 SEGMENTAL AND SOMATIC DYSFUNCTION OF THORACIC REGION: Primary | ICD-10-CM

## 2021-01-07 DIAGNOSIS — M99.01 SEGMENTAL AND SOMATIC DYSFUNCTION OF CERVICAL REGION: ICD-10-CM

## 2021-01-07 PROCEDURE — 98940 CHIROPRACT MANJ 1-2 REGIONS: CPT | Mod: AT | Performed by: CHIROPRACTOR

## 2021-01-11 ENCOUNTER — OFFICE VISIT (OUTPATIENT)
Dept: CHIROPRACTIC MEDICINE | Facility: OTHER | Age: 31
End: 2021-01-11
Attending: CHIROPRACTOR
Payer: COMMERCIAL

## 2021-01-11 DIAGNOSIS — M99.01 SEGMENTAL AND SOMATIC DYSFUNCTION OF CERVICAL REGION: Primary | ICD-10-CM

## 2021-01-11 DIAGNOSIS — M99.02 SEGMENTAL AND SOMATIC DYSFUNCTION OF THORACIC REGION: ICD-10-CM

## 2021-01-11 DIAGNOSIS — M54.2 CERVICALGIA: ICD-10-CM

## 2021-01-11 PROCEDURE — 98940 CHIROPRACT MANJ 1-2 REGIONS: CPT | Mod: AT | Performed by: CHIROPRACTOR

## 2021-01-11 NOTE — PROGRESS NOTES
Subjective Finding:    Chief compalint: Patient presents with:  Neck Pain: with headaches  , Pain Scale: 4/10, Intensity: sharp, Duration: 2 months, Radiating: no.    Date of injury:     Activities that the pain restricts:   Home/household/hobbies/social activities: yes.  Work duties: yes.  Sleep: yes.  Makes symptoms better: rest.  Makes symptoms worse: activity.  Have you seen anyone else for the symptoms? No.  Work related: no.  Automobile related injury: no.    Objective and Assessment:    Posture Analysis:   High shoulder: .  Head tilt: .  High iliac crest: .  Head carriage: neutral.  Thoracic Kyphosis: neutral.  Lumbar Lordosis: neutral.    Lumbar Range of Motion: .  Cervical Range of Motion: extension decreased, left lateral flexion decreased and right lateral flexion decreased.  Thoracic Range of Motion: .  Extremity Range of Motion: .    Palpation:   Traps: sharp pain, referred pain: no    Segmental dysfunction pre-treatment and treatment area: C5, C6, C7 and T4.    Assessment post-treatment:  Cervical: ROM increased.  Thoracic: ROM increased.  Lumbar: .    Comments: .      Complicating Factors: .    Procedure(s):  CMT:  02274 Chiropractic manipulative treatment 1-2 regions performed   Cervical: Diversified, See above for level, Supine and Thoracic: Diversified, See above for level, Prone    Modalities:  None performed this visit    Therapeutic procedures:  None    Plan:  Treatment plan: PRN.  Instructed patient: stretch as instructed at visit.  Short term goals: reduce pain.  Long term goals: increase ADL.  Prognosis: excellent.

## 2021-01-13 NOTE — PROGRESS NOTES
Subjective Finding:    Chief compalint: Patient presents with:  Neck Pain  , Pain Scale: 4/10, Intensity: sharp, Duration: 2 months, Radiating: no.    Date of injury:     Activities that the pain restricts:   Home/household/hobbies/social activities: yes.  Work duties: yes.  Sleep: yes.  Makes symptoms better: rest.  Makes symptoms worse: activity.  Have you seen anyone else for the symptoms? No.  Work related: no.  Automobile related injury: no.    Objective and Assessment:    Posture Analysis:   High shoulder: .  Head tilt: .  High iliac crest: .  Head carriage: neutral.  Thoracic Kyphosis: neutral.  Lumbar Lordosis: neutral.    Lumbar Range of Motion: .  Cervical Range of Motion: extension decreased, left lateral flexion decreased and right lateral flexion decreased.  Thoracic Range of Motion: .  Extremity Range of Motion: .    Palpation:   Traps: sharp pain, referred pain: no    Segmental dysfunction pre-treatment and treatment area: C5, C6, C7 and T4.    Assessment post-treatment:  Cervical: ROM increased.  Thoracic: ROM increased.  Lumbar: .    Comments: .      Complicating Factors: .    Procedure(s):  CMT:  24887 Chiropractic manipulative treatment 1-2 regions performed   Cervical: Diversified, See above for level, Supine and Thoracic: Diversified, See above for level, Prone    Modalities:  None performed this visit    Therapeutic procedures:  None    Plan:  Treatment plan: PRN.  Instructed patient: stretch as instructed at visit.  Short term goals: reduce pain.  Long term goals: increase ADL.  Prognosis: excellent.

## 2021-01-14 ENCOUNTER — OFFICE VISIT (OUTPATIENT)
Dept: CHIROPRACTIC MEDICINE | Facility: OTHER | Age: 31
End: 2021-01-14
Attending: CHIROPRACTOR
Payer: COMMERCIAL

## 2021-01-14 DIAGNOSIS — M99.02 SEGMENTAL AND SOMATIC DYSFUNCTION OF THORACIC REGION: ICD-10-CM

## 2021-01-14 DIAGNOSIS — M99.01 SEGMENTAL AND SOMATIC DYSFUNCTION OF CERVICAL REGION: Primary | ICD-10-CM

## 2021-01-14 DIAGNOSIS — M54.2 CERVICALGIA: ICD-10-CM

## 2021-01-14 PROCEDURE — 98940 CHIROPRACT MANJ 1-2 REGIONS: CPT | Mod: AT | Performed by: CHIROPRACTOR

## 2021-01-18 ENCOUNTER — OFFICE VISIT (OUTPATIENT)
Dept: CHIROPRACTIC MEDICINE | Facility: OTHER | Age: 31
End: 2021-01-18
Attending: CHIROPRACTOR
Payer: COMMERCIAL

## 2021-01-18 DIAGNOSIS — M99.01 SEGMENTAL AND SOMATIC DYSFUNCTION OF CERVICAL REGION: Primary | ICD-10-CM

## 2021-01-18 DIAGNOSIS — M99.02 SEGMENTAL AND SOMATIC DYSFUNCTION OF THORACIC REGION: ICD-10-CM

## 2021-01-18 DIAGNOSIS — M54.2 CERVICALGIA: ICD-10-CM

## 2021-01-18 PROCEDURE — 98940 CHIROPRACT MANJ 1-2 REGIONS: CPT | Mod: AT | Performed by: CHIROPRACTOR

## 2021-01-18 NOTE — PROGRESS NOTES
Subjective Finding:    Chief compalint: Patient presents with:  Neck Pain  , Pain Scale: 4/10, Intensity: sharp, Duration: 2 months, Radiating: no.    Date of injury:     Activities that the pain restricts:   Home/household/hobbies/social activities: yes.  Work duties: yes.  Sleep: yes.  Makes symptoms better: rest.  Makes symptoms worse: activity.  Have you seen anyone else for the symptoms? No.  Work related: no.  Automobile related injury: no.    Objective and Assessment:    Posture Analysis:   High shoulder: .  Head tilt: .  High iliac crest: .  Head carriage: neutral.  Thoracic Kyphosis: neutral.  Lumbar Lordosis: neutral.    Lumbar Range of Motion: .  Cervical Range of Motion: extension decreased, left lateral flexion decreased and right lateral flexion decreased.  Thoracic Range of Motion: .  Extremity Range of Motion: .    Palpation:   Traps: sharp pain, referred pain: no    Segmental dysfunction pre-treatment and treatment area: C5, C6, C7 and T4.    Assessment post-treatment:  Cervical: ROM increased.  Thoracic: ROM increased.  Lumbar: .    Comments: .      Complicating Factors: .    Procedure(s):  CMT:  41241 Chiropractic manipulative treatment 1-2 regions performed   Cervical: Diversified, See above for level, Supine and Thoracic: Diversified, See above for level, Prone    Modalities:  None performed this visit    Therapeutic procedures:  None    Plan:  Treatment plan: PRN.  Instructed patient: stretch as instructed at visit.  Short term goals: reduce pain.  Long term goals: increase ADL.  Prognosis: excellent.

## 2021-01-20 NOTE — PROGRESS NOTES
Subjective Finding:    Chief compalint: Patient presents with:  Neck Pain  , Pain Scale: 4/10, Intensity: sharp, Duration: 2 months, Radiating: no.    Date of injury:     Activities that the pain restricts:   Home/household/hobbies/social activities: yes.  Work duties: yes.  Sleep: yes.  Makes symptoms better: rest.  Makes symptoms worse: activity.  Have you seen anyone else for the symptoms? No.  Work related: no.  Automobile related injury: no.    Objective and Assessment:    Posture Analysis:   High shoulder: .  Head tilt: .  High iliac crest: .  Head carriage: neutral.  Thoracic Kyphosis: neutral.  Lumbar Lordosis: neutral.    Lumbar Range of Motion: .  Cervical Range of Motion: extension decreased, left lateral flexion decreased and right lateral flexion decreased.  Thoracic Range of Motion: .  Extremity Range of Motion: .    Palpation:   Traps: sharp pain, referred pain: no    Segmental dysfunction pre-treatment and treatment area: C5, C6, C7 and T4.    Assessment post-treatment:  Cervical: ROM increased.  Thoracic: ROM increased.  Lumbar: .    Comments: .      Complicating Factors: .    Procedure(s):  CMT:  09654 Chiropractic manipulative treatment 1-2 regions performed   Cervical: Diversified, See above for level, Supine and Thoracic: Diversified, See above for level, Prone    Modalities:  None performed this visit    Therapeutic procedures:  None    Plan:  Treatment plan: PRN.  Instructed patient: stretch as instructed at visit.  Short term goals: reduce pain.  Long term goals: increase ADL.  Prognosis: excellent.

## 2021-01-25 ENCOUNTER — OFFICE VISIT (OUTPATIENT)
Dept: CHIROPRACTIC MEDICINE | Facility: OTHER | Age: 31
End: 2021-01-25
Attending: CHIROPRACTOR
Payer: COMMERCIAL

## 2021-01-25 DIAGNOSIS — M99.01 SEGMENTAL AND SOMATIC DYSFUNCTION OF CERVICAL REGION: Primary | ICD-10-CM

## 2021-01-25 DIAGNOSIS — M99.02 SEGMENTAL AND SOMATIC DYSFUNCTION OF THORACIC REGION: ICD-10-CM

## 2021-01-25 DIAGNOSIS — M54.2 CERVICALGIA: ICD-10-CM

## 2021-01-25 PROCEDURE — 98940 CHIROPRACT MANJ 1-2 REGIONS: CPT | Mod: AT | Performed by: CHIROPRACTOR

## 2021-01-25 NOTE — PROGRESS NOTES
Subjective Finding:    Chief compalint: Patient presents with:  Neck Pain  , Pain Scale: 4/10, Intensity: sharp, Duration: 2 months, Radiating: no.    Date of injury:     Activities that the pain restricts:   Home/household/hobbies/social activities: yes.  Work duties: yes.  Sleep: yes.  Makes symptoms better: rest.  Makes symptoms worse: activity.  Have you seen anyone else for the symptoms? No.  Work related: no.  Automobile related injury: no.    Objective and Assessment:    Posture Analysis:   High shoulder: .  Head tilt: .  High iliac crest: .  Head carriage: neutral.  Thoracic Kyphosis: neutral.  Lumbar Lordosis: neutral.    Lumbar Range of Motion: .  Cervical Range of Motion: extension decreased, left lateral flexion decreased and right lateral flexion decreased.  Thoracic Range of Motion: .  Extremity Range of Motion: .    Palpation:   Traps: sharp pain, referred pain: no    Segmental dysfunction pre-treatment and treatment area: C5, C6, C7 and T4.    Assessment post-treatment:  Cervical: ROM increased.  Thoracic: ROM increased.  Lumbar: .    Comments: .      Complicating Factors: .    Procedure(s):  CMT:  98651 Chiropractic manipulative treatment 1-2 regions performed   Cervical: Diversified, See above for level, Supine and Thoracic: Diversified, See above for level, Prone    Modalities:  None performed this visit    Therapeutic procedures:  None    Plan:  Treatment plan: PRN.  Instructed patient: stretch as instructed at visit.  Short term goals: reduce pain.  Long term goals: increase ADL.  Prognosis: excellent.              [FreeTextEntry8] : Pt is a 46 yo M w/ PMH of neurofibromas who presented w/ pain in the R groin.\par \par Pain started about a month ago located in the inguinal crease. The pain occurs only when pt picks up heavy objects and is rated as a 7/10 shooting type of pain with radiation to the midabdomen. It is relieved when he puts down said object. He denies any masses in the area, changes in BMs. Pt works 6 days a week as  and on his day off does not endorse pain in the area.

## 2021-02-01 ENCOUNTER — OFFICE VISIT (OUTPATIENT)
Dept: CHIROPRACTIC MEDICINE | Facility: OTHER | Age: 31
End: 2021-02-01
Attending: CHIROPRACTOR
Payer: COMMERCIAL

## 2021-02-01 DIAGNOSIS — M99.02 SEGMENTAL AND SOMATIC DYSFUNCTION OF THORACIC REGION: ICD-10-CM

## 2021-02-01 DIAGNOSIS — M99.01 SEGMENTAL AND SOMATIC DYSFUNCTION OF CERVICAL REGION: Primary | ICD-10-CM

## 2021-02-01 DIAGNOSIS — M54.2 CERVICALGIA: ICD-10-CM

## 2021-02-01 PROCEDURE — 98940 CHIROPRACT MANJ 1-2 REGIONS: CPT | Mod: AT | Performed by: CHIROPRACTOR

## 2021-02-01 NOTE — PROGRESS NOTES
Subjective Finding:    Chief compalint: Patient presents with:  Neck Pain  , Pain Scale: 4/10, Intensity: sharp, Duration: 2 months, Radiating: no.    Date of injury:     Activities that the pain restricts:   Home/household/hobbies/social activities: yes.  Work duties: yes.  Sleep: yes.  Makes symptoms better: rest.  Makes symptoms worse: activity.  Have you seen anyone else for the symptoms? No.  Work related: no.  Automobile related injury: no.    Objective and Assessment:    Posture Analysis:   High shoulder: .  Head tilt: .  High iliac crest: .  Head carriage: neutral.  Thoracic Kyphosis: neutral.  Lumbar Lordosis: neutral.    Lumbar Range of Motion: .  Cervical Range of Motion: extension decreased, left lateral flexion decreased and right lateral flexion decreased.  Thoracic Range of Motion: .  Extremity Range of Motion: .    Palpation:   Traps: sharp pain, referred pain: no    Segmental dysfunction pre-treatment and treatment area: C5, C6, C7 and T4.    Assessment post-treatment:  Cervical: ROM increased.  Thoracic: ROM increased.  Lumbar: .    Comments: .      Complicating Factors: .    Procedure(s):  CMT:  44843 Chiropractic manipulative treatment 1-2 regions performed   Cervical: Diversified, See above for level, Supine and Thoracic: Diversified, See above for level, Prone    Modalities:  None performed this visit    Therapeutic procedures:  None    Plan:  Treatment plan: PRN.  Instructed patient: stretch as instructed at visit.  Short term goals: reduce pain.  Long term goals: increase ADL.  Prognosis: excellent.

## 2021-02-04 ENCOUNTER — OFFICE VISIT (OUTPATIENT)
Dept: CHIROPRACTIC MEDICINE | Facility: OTHER | Age: 31
End: 2021-02-04
Attending: CHIROPRACTOR
Payer: COMMERCIAL

## 2021-02-04 DIAGNOSIS — M99.02 SEGMENTAL AND SOMATIC DYSFUNCTION OF THORACIC REGION: Primary | ICD-10-CM

## 2021-02-04 DIAGNOSIS — M54.50 ACUTE BILATERAL LOW BACK PAIN WITHOUT SCIATICA: ICD-10-CM

## 2021-02-04 DIAGNOSIS — M99.03 SEGMENTAL AND SOMATIC DYSFUNCTION OF LUMBAR REGION: ICD-10-CM

## 2021-02-04 DIAGNOSIS — M99.01 SEGMENTAL AND SOMATIC DYSFUNCTION OF CERVICAL REGION: ICD-10-CM

## 2021-02-04 PROCEDURE — 98941 CHIROPRACT MANJ 3-4 REGIONS: CPT | Mod: AT | Performed by: CHIROPRACTOR

## 2021-02-04 NOTE — PROGRESS NOTES
Subjective Finding:    Chief compalint: Patient presents with:  Back Pain  Neck Pain  , Pain Scale: 4/10, Intensity: sharp, Duration: 2 months, Radiating: no.    Date of injury:     Activities that the pain restricts:   Home/household/hobbies/social activities: yes.  Work duties: yes.  Sleep: yes.  Makes symptoms better: rest.  Makes symptoms worse: activity.  Have you seen anyone else for the symptoms? No.  Work related: no.  Automobile related injury: no.    Objective and Assessment:    Posture Analysis:   High shoulder: .  Head tilt: .  High iliac crest: .  Head carriage: neutral.  Thoracic Kyphosis: neutral.  Lumbar Lordosis: neutral.    Lumbar Range of Motion: .  Cervical Range of Motion: extension decreased, left lateral flexion decreased and right lateral flexion decreased.  Thoracic Range of Motion: .  Extremity Range of Motion: .    Palpation:   Traps: sharp pain, referred pain: no    Segmental dysfunction pre-treatment and treatment area: C5, C6, C7 and T4.  L5    Assessment post-treatment:  Cervical: ROM increased.  Thoracic: ROM increased.  Lumbar: .    Comments: .      Complicating Factors: .    Procedure(s):  CMT:  50763 Chiropractic manipulative treatment 1-2 regions performed   Cervical: Diversified, See above for level, Supine and Thoracic: Diversified, See above for level, Prone    Modalities:  None performed this visit    Therapeutic procedures:  None    Plan:  Treatment plan: PRN.  Instructed patient: stretch as instructed at visit.  Short term goals: reduce pain.  Long term goals: increase ADL.  Prognosis: excellent.

## 2021-02-08 ENCOUNTER — OFFICE VISIT (OUTPATIENT)
Dept: CHIROPRACTIC MEDICINE | Facility: OTHER | Age: 31
End: 2021-02-08
Attending: CHIROPRACTOR
Payer: COMMERCIAL

## 2021-02-08 DIAGNOSIS — M99.03 SEGMENTAL AND SOMATIC DYSFUNCTION OF LUMBAR REGION: ICD-10-CM

## 2021-02-08 DIAGNOSIS — M54.2 CERVICALGIA: ICD-10-CM

## 2021-02-08 DIAGNOSIS — M99.01 SEGMENTAL AND SOMATIC DYSFUNCTION OF CERVICAL REGION: Primary | ICD-10-CM

## 2021-02-08 DIAGNOSIS — M99.02 SEGMENTAL AND SOMATIC DYSFUNCTION OF THORACIC REGION: ICD-10-CM

## 2021-02-08 PROCEDURE — 98941 CHIROPRACT MANJ 3-4 REGIONS: CPT | Mod: AT | Performed by: CHIROPRACTOR

## 2021-02-15 ENCOUNTER — OFFICE VISIT (OUTPATIENT)
Dept: CHIROPRACTIC MEDICINE | Facility: OTHER | Age: 31
End: 2021-02-15
Attending: CHIROPRACTOR
Payer: COMMERCIAL

## 2021-02-15 DIAGNOSIS — M99.02 SEGMENTAL AND SOMATIC DYSFUNCTION OF THORACIC REGION: ICD-10-CM

## 2021-02-15 DIAGNOSIS — M99.01 SEGMENTAL AND SOMATIC DYSFUNCTION OF CERVICAL REGION: Primary | ICD-10-CM

## 2021-02-15 DIAGNOSIS — M54.2 CERVICALGIA: ICD-10-CM

## 2021-02-15 DIAGNOSIS — M99.03 SEGMENTAL AND SOMATIC DYSFUNCTION OF LUMBAR REGION: ICD-10-CM

## 2021-02-15 PROCEDURE — 98941 CHIROPRACT MANJ 3-4 REGIONS: CPT | Mod: AT | Performed by: CHIROPRACTOR

## 2021-02-15 NOTE — PROGRESS NOTES
Subjective Finding:    Chief compalint: Patient presents with:  Neck Pain: ROM in neck has improved.  Less pain  Back Pain  , Pain Scale: 4/10, Intensity: sharp, Duration: 2 months, Radiating: no.    Date of injury:     Activities that the pain restricts:   Home/household/hobbies/social activities: yes.  Work duties: yes.  Sleep: yes.  Makes symptoms better: rest.  Makes symptoms worse: activity.  Have you seen anyone else for the symptoms? No.  Work related: no.  Automobile related injury: no.    Objective and Assessment:    Posture Analysis:   High shoulder: .  Head tilt: .  High iliac crest: .  Head carriage: neutral.  Thoracic Kyphosis: neutral.  Lumbar Lordosis: neutral.    Lumbar Range of Motion: .  Cervical Range of Motion: extension decreased, left lateral flexion decreased and right lateral flexion decreased.  Thoracic Range of Motion: .  Extremity Range of Motion: .    Palpation:   Traps: sharp pain, referred pain: no    Segmental dysfunction pre-treatment and treatment area: C5, C6, C7 and T4.  L5    Assessment post-treatment:  Cervical: ROM increased.  Thoracic: ROM increased.  Lumbar: .    Comments: .      Complicating Factors: .    Procedure(s):  [facility]:  46438 Chiropractic manipulative treatment 1-2 regions performed   Cervical: Diversified, See above for level, Supine and Thoracic: Diversified, See above for level, Prone    Modalities:  None performed this visit    Therapeutic procedures:  None    Plan:  Treatment plan: PRN.  Instructed patient: stretch as instructed at visit.  Short term goals: reduce pain.  Long term goals: increase ADL.  Prognosis: excellent.

## 2021-02-16 NOTE — PROGRESS NOTES
Subjective Finding:    Chief compalint: Patient presents with:  Neck Pain  Back Pain  , Pain Scale: 4/10, Intensity: sharp, Duration: 2 months, Radiating: no.    Date of injury:     Activities that the pain restricts:   Home/household/hobbies/social activities: yes.  Work duties: yes.  Sleep: yes.  Makes symptoms better: rest.  Makes symptoms worse: activity.  Have you seen anyone else for the symptoms? No.  Work related: no.  Automobile related injury: no.    Objective and Assessment:    Posture Analysis:   High shoulder: .  Head tilt: .  High iliac crest: .  Head carriage: neutral.  Thoracic Kyphosis: neutral.  Lumbar Lordosis: neutral.    Lumbar Range of Motion: .  Cervical Range of Motion: extension decreased, left lateral flexion decreased and right lateral flexion decreased.  Thoracic Range of Motion: .  Extremity Range of Motion: .    Palpation:   Traps: sharp pain, referred pain: no    Segmental dysfunction pre-treatment and treatment area: C5, C6, C7 and T4.  L5    Assessment post-treatment:  Cervical: ROM increased.  Thoracic: ROM increased.  Lumbar: .    Comments: .      Complicating Factors: .    Procedure(s):  CMT:  69885 Chiropractic manipulative treatment 1-2 regions performed   Cervical: Diversified, See above for level, Supine and Thoracic: Diversified, See above for level, Prone    Modalities:  None performed this visit    Therapeutic procedures:  None    Plan:  Treatment plan: PRN.  Instructed patient: stretch as instructed at visit.  Short term goals: reduce pain.  Long term goals: increase ADL.  Prognosis: excellent.

## 2021-02-18 ENCOUNTER — OFFICE VISIT (OUTPATIENT)
Dept: CHIROPRACTIC MEDICINE | Facility: OTHER | Age: 31
End: 2021-02-18
Attending: CHIROPRACTOR
Payer: COMMERCIAL

## 2021-02-18 DIAGNOSIS — M54.2 CERVICALGIA: ICD-10-CM

## 2021-02-18 DIAGNOSIS — M99.01 SEGMENTAL AND SOMATIC DYSFUNCTION OF CERVICAL REGION: Primary | ICD-10-CM

## 2021-02-18 DIAGNOSIS — M99.02 SEGMENTAL AND SOMATIC DYSFUNCTION OF THORACIC REGION: ICD-10-CM

## 2021-02-18 DIAGNOSIS — M99.03 SEGMENTAL AND SOMATIC DYSFUNCTION OF LUMBAR REGION: ICD-10-CM

## 2021-02-18 PROCEDURE — 98941 CHIROPRACT MANJ 3-4 REGIONS: CPT | Mod: AT | Performed by: CHIROPRACTOR

## 2021-02-19 NOTE — PROGRESS NOTES
"    Assessment & Plan     Hashimoto's thyroiditis  - TSH with free T4 reflex  - Comprehensive metabolic panel (BMP + Alb, Alk Phos, ALT, AST, Total. Bili, TP)    Vitamin D deficiency  - Vitamin D level  - D3 5000 U daily  - Multiple vitamin daily    Yeimy Corona, GILA  [facility] - RODO Brunner is a 30 year old who presents for the following health issues       Hypothyroidism Follow-up    Since last visit, patient describes the following symptoms: Weight stable, no hair loss, no skin changes, no constipation, no loose stools        How many servings of fruits and vegetables do you eat daily?  4 or more    On average, how many sweetened beverages do you drink each day (Examples: soda, juice, sweet tea, etc.  Do NOT count diet or artificially sweetened beverages)?   0    How many days per week do you exercise enough to make your heart beat faster? 5    How many minutes a day do you exercise enough to make your heart beat faster? 60 or more    How many days per week do you miss taking your medication? 0        PHQ 8/15/2018 8/28/2018 2/22/2021   PHQ-9 Total Score 3 6 2   Q9: Thoughts of better off dead/self-harm past 2 weeks Not at all Not at all Not at all       ÁLVARO-7 SCORE 8/15/2018 8/28/2018 2/22/2021   Total Score 3 3 2       Review of Systems   Constitutional, HEENT, cardiovascular, pulmonary, gi and gu systems are negative, except as otherwise noted.          Objective    /78   Pulse 85   Temp 97.9  F (36.6  C) (Tympanic)   Ht 1.88 m (6' 2\")   Wt 111.1 kg (245 lb)   SpO2 97%   BMI 31.46 kg/m    Body mass index is 31.46 kg/m .       Physical Exam   GENERAL: healthy, alert and no distress  EYES: Eyes grossly normal to inspection, PERRL and conjunctivae and sclerae normal  HENT: ear canals and TM's normal, nose and mouth without ulcers or lesions  NECK: no adenopathy, no asymmetry, masses, or scars and thyroid normal to palpation  RESP: lungs clear to auscultation - no rales, " rhonchi or wheezes  CV: regular rate and rhythm, normal S1 S2, no S3 or S4, no murmur, click or rub, no peripheral edema and peripheral pulses strong  SKIN: no suspicious lesions or rashes  PSYCH: mentation appears normal, affect normal/bright

## 2021-02-22 ENCOUNTER — OFFICE VISIT (OUTPATIENT)
Dept: FAMILY MEDICINE | Facility: OTHER | Age: 31
End: 2021-02-22
Attending: NURSE PRACTITIONER
Payer: COMMERCIAL

## 2021-02-22 VITALS
WEIGHT: 245 LBS | DIASTOLIC BLOOD PRESSURE: 78 MMHG | TEMPERATURE: 97.9 F | SYSTOLIC BLOOD PRESSURE: 136 MMHG | OXYGEN SATURATION: 97 % | BODY MASS INDEX: 31.44 KG/M2 | HEIGHT: 74 IN | HEART RATE: 85 BPM

## 2021-02-22 DIAGNOSIS — E06.3 HASHIMOTO'S THYROIDITIS: Primary | ICD-10-CM

## 2021-02-22 DIAGNOSIS — E55.9 VITAMIN D DEFICIENCY: ICD-10-CM

## 2021-02-22 LAB
ALBUMIN SERPL-MCNC: 4.3 G/DL (ref 3.4–5)
ALP SERPL-CCNC: 105 U/L (ref 40–150)
ALT SERPL W P-5'-P-CCNC: 69 U/L (ref 0–70)
ANION GAP SERPL CALCULATED.3IONS-SCNC: 7 MMOL/L (ref 3–14)
AST SERPL W P-5'-P-CCNC: 133 U/L (ref 0–45)
BILIRUB SERPL-MCNC: 0.4 MG/DL (ref 0.2–1.3)
BUN SERPL-MCNC: 17 MG/DL (ref 7–30)
CALCIUM SERPL-MCNC: 9.5 MG/DL (ref 8.5–10.1)
CHLORIDE SERPL-SCNC: 105 MMOL/L (ref 94–109)
CO2 SERPL-SCNC: 25 MMOL/L (ref 20–32)
CREAT SERPL-MCNC: 1.06 MG/DL (ref 0.66–1.25)
GFR SERPL CREATININE-BSD FRML MDRD: >90 ML/MIN/{1.73_M2}
GLUCOSE SERPL-MCNC: 86 MG/DL (ref 70–99)
POTASSIUM SERPL-SCNC: 3.9 MMOL/L (ref 3.4–5.3)
PROT SERPL-MCNC: 7.9 G/DL (ref 6.8–8.8)
SODIUM SERPL-SCNC: 137 MMOL/L (ref 133–144)
T4 FREE SERPL-MCNC: 1.02 NG/DL (ref 0.76–1.46)
TSH SERPL DL<=0.005 MIU/L-ACNC: 7.8 MU/L (ref 0.4–4)

## 2021-02-22 PROCEDURE — 80053 COMPREHEN METABOLIC PANEL: CPT | Performed by: NURSE PRACTITIONER

## 2021-02-22 PROCEDURE — 36415 COLL VENOUS BLD VENIPUNCTURE: CPT | Performed by: NURSE PRACTITIONER

## 2021-02-22 PROCEDURE — 99213 OFFICE O/P EST LOW 20 MIN: CPT | Performed by: NURSE PRACTITIONER

## 2021-02-22 PROCEDURE — 84439 ASSAY OF FREE THYROXINE: CPT | Performed by: NURSE PRACTITIONER

## 2021-02-22 PROCEDURE — 84443 ASSAY THYROID STIM HORMONE: CPT | Performed by: NURSE PRACTITIONER

## 2021-02-22 PROCEDURE — 82306 VITAMIN D 25 HYDROXY: CPT | Performed by: NURSE PRACTITIONER

## 2021-02-22 ASSESSMENT — MIFFLIN-ST. JEOR: SCORE: 2141.06

## 2021-02-22 ASSESSMENT — ANXIETY QUESTIONNAIRES
1. FEELING NERVOUS, ANXIOUS, OR ON EDGE: NOT AT ALL
3. WORRYING TOO MUCH ABOUT DIFFERENT THINGS: SEVERAL DAYS
2. NOT BEING ABLE TO STOP OR CONTROL WORRYING: SEVERAL DAYS
7. FEELING AFRAID AS IF SOMETHING AWFUL MIGHT HAPPEN: NOT AT ALL
6. BECOMING EASILY ANNOYED OR IRRITABLE: NOT AT ALL
5. BEING SO RESTLESS THAT IT IS HARD TO SIT STILL: NOT AT ALL
GAD7 TOTAL SCORE: 2
4. TROUBLE RELAXING: NOT AT ALL
IF YOU CHECKED OFF ANY PROBLEMS ON THIS QUESTIONNAIRE, HOW DIFFICULT HAVE THESE PROBLEMS MADE IT FOR YOU TO DO YOUR WORK, TAKE CARE OF THINGS AT HOME, OR GET ALONG WITH OTHER PEOPLE: NOT DIFFICULT AT ALL

## 2021-02-22 ASSESSMENT — PATIENT HEALTH QUESTIONNAIRE - PHQ9: SUM OF ALL RESPONSES TO PHQ QUESTIONS 1-9: 2

## 2021-02-22 ASSESSMENT — PAIN SCALES - GENERAL: PAINLEVEL: NO PAIN (0)

## 2021-02-22 NOTE — PATIENT INSTRUCTIONS
Assessment & Plan     Hashimoto's thyroiditis  - TSH with free T4 reflex  - Comprehensive metabolic panel (BMP + Alb, Alk Phos, ALT, AST, Total. Bili, TP)    Vitamin D deficiency  - Vitamin D level  - D3 5000 U daily  - Multiple vitamin daily      Yeimy Corona CNP  [facility] [place] [facility]

## 2021-02-22 NOTE — NURSING NOTE
"Chief Complaint   Patient presents with     Thyroid Disease       Initial /78   Pulse 85   Temp 97.9  F (36.6  C) (Tympanic)   Ht 1.88 m (6' 2\")   Wt 111.1 kg (245 lb)   SpO2 97%   BMI 31.46 kg/m   Estimated body mass index is 31.46 kg/m  as calculated from the following:    Height as of this encounter: 1.88 m (6' 2\").    Weight as of this encounter: 111.1 kg (245 lb).  Medication Reconciliation: complete  Sheila Abreu LPN  "

## 2021-02-23 DIAGNOSIS — E03.9 HYPOTHYROIDISM, UNSPECIFIED TYPE: Primary | ICD-10-CM

## 2021-02-23 RX ORDER — LEVOTHYROXINE SODIUM 25 UG/1
25 TABLET ORAL DAILY
Qty: 90 TABLET | Refills: 1 | Status: SHIPPED | OUTPATIENT
Start: 2021-02-23 | End: 2021-08-23

## 2021-02-23 ASSESSMENT — ANXIETY QUESTIONNAIRES: GAD7 TOTAL SCORE: 2

## 2021-02-23 NOTE — PROGRESS NOTES
Subjective Finding:    Chief compalint: Patient presents with:  Neck Pain: stiffness is getting better  Back Pain  , Pain Scale: 4/10, Intensity: sharp, Duration: 2 months, Radiating: no.    Date of injury:     Activities that the pain restricts:   Home/household/hobbies/social activities: yes.  Work duties: yes.  Sleep: yes.  Makes symptoms better: rest.  Makes symptoms worse: activity.  Have you seen anyone else for the symptoms? No.  Work related: no.  Automobile related injury: no.    Objective and Assessment:    Posture Analysis:   High shoulder: .  Head tilt: .  High iliac crest: .  Head carriage: neutral.  Thoracic Kyphosis: neutral.  Lumbar Lordosis: neutral.    Lumbar Range of Motion: .  Cervical Range of Motion: extension decreased, left lateral flexion decreased and right lateral flexion decreased.  Thoracic Range of Motion: .  Extremity Range of Motion: .    Palpation:   Traps: sharp pain, referred pain: no    Segmental dysfunction pre-treatment and treatment area: C5, C6, C7 and T4.  L5    Assessment post-treatment:  Cervical: ROM increased.  Thoracic: ROM increased.  Lumbar: .    Comments: .      Complicating Factors: .    Procedure(s):  CMT:  56401 Chiropractic manipulative treatment 1-2 regions performed   Cervical: Diversified, See above for level, Supine and Thoracic: Diversified, See above for level, Prone    Modalities:  None performed this visit    Therapeutic procedures:  None    Plan:  Treatment plan: PRN.  Instructed patient: stretch as instructed at visit.  Short term goals: reduce pain.  Long term goals: increase ADL.  Prognosis: excellent.

## 2021-02-24 LAB — DEPRECATED CALCIDIOL+CALCIFEROL SERPL-MC: 33 UG/L (ref 20–75)

## 2021-02-25 ENCOUNTER — OFFICE VISIT (OUTPATIENT)
Dept: CHIROPRACTIC MEDICINE | Facility: OTHER | Age: 31
End: 2021-02-25
Attending: CHIROPRACTOR
Payer: COMMERCIAL

## 2021-02-25 DIAGNOSIS — M54.2 CERVICALGIA: ICD-10-CM

## 2021-02-25 DIAGNOSIS — M99.03 SEGMENTAL AND SOMATIC DYSFUNCTION OF LUMBAR REGION: ICD-10-CM

## 2021-02-25 DIAGNOSIS — M99.01 SEGMENTAL AND SOMATIC DYSFUNCTION OF CERVICAL REGION: Primary | ICD-10-CM

## 2021-02-25 DIAGNOSIS — M99.02 SEGMENTAL AND SOMATIC DYSFUNCTION OF THORACIC REGION: ICD-10-CM

## 2021-02-25 PROCEDURE — 98941 CHIROPRACT MANJ 3-4 REGIONS: CPT | Mod: AT | Performed by: CHIROPRACTOR

## 2021-03-02 NOTE — PROGRESS NOTES
Subjective Finding:    Chief compalint: Patient presents with:  Neck Pain: gettingbetter  Back Pain  , Pain Scale: 4/10, Intensity: sharp, Duration: 2 months, Radiating: no.    Date of injury:     Activities that the pain restricts:   Home/household/hobbies/social activities: yes.  Work duties: yes.  Sleep: yes.  Makes symptoms better: rest.  Makes symptoms worse: activity.  Have you seen anyone else for the symptoms? No.  Work related: no.  Automobile related injury: no.    Objective and Assessment:    Posture Analysis:   High shoulder: .  Head tilt: .  High iliac crest: .  Head carriage: neutral.  Thoracic Kyphosis: neutral.  Lumbar Lordosis: neutral.    Lumbar Range of Motion: .  Cervical Range of Motion: extension decreased, left lateral flexion decreased and right lateral flexion decreased.  Thoracic Range of Motion: .  Extremity Range of Motion: .    Palpation:   Traps: sharp pain, referred pain: no    Segmental dysfunction pre-treatment and treatment area: C5, C6, C7 and T4.  L5    Assessment post-treatment:  Cervical: ROM increased.  Thoracic: ROM increased.  Lumbar: .    Comments: .      Complicating Factors: .    Procedure(s):  CMT:  25656 Chiropractic manipulative treatment 1-2 regions performed   Cervical: Diversified, See above for level, Supine and Thoracic: Diversified, See above for level, Prone    Modalities:  None performed this visit    Therapeutic procedures:  None    Plan:  Treatment plan: PRN.  Instructed patient: stretch as instructed at visit.  Short term goals: reduce pain.  Long term goals: increase ADL.  Prognosis: excellent.

## 2021-03-04 ENCOUNTER — OFFICE VISIT (OUTPATIENT)
Dept: CHIROPRACTIC MEDICINE | Facility: OTHER | Age: 31
End: 2021-03-04
Attending: CHIROPRACTOR
Payer: COMMERCIAL

## 2021-03-04 DIAGNOSIS — M54.2 CERVICALGIA: ICD-10-CM

## 2021-03-04 DIAGNOSIS — M99.01 SEGMENTAL AND SOMATIC DYSFUNCTION OF CERVICAL REGION: Primary | ICD-10-CM

## 2021-03-04 DIAGNOSIS — M99.03 SEGMENTAL AND SOMATIC DYSFUNCTION OF LUMBAR REGION: ICD-10-CM

## 2021-03-04 DIAGNOSIS — M99.02 SEGMENTAL AND SOMATIC DYSFUNCTION OF THORACIC REGION: ICD-10-CM

## 2021-03-04 PROCEDURE — 98941 CHIROPRACT MANJ 3-4 REGIONS: CPT | Mod: AT | Performed by: CHIROPRACTOR

## 2021-03-11 ENCOUNTER — OFFICE VISIT (OUTPATIENT)
Dept: CHIROPRACTIC MEDICINE | Facility: OTHER | Age: 31
End: 2021-03-11
Attending: CHIROPRACTOR
Payer: COMMERCIAL

## 2021-03-11 DIAGNOSIS — M99.03 SEGMENTAL AND SOMATIC DYSFUNCTION OF LUMBAR REGION: ICD-10-CM

## 2021-03-11 DIAGNOSIS — M99.01 SEGMENTAL AND SOMATIC DYSFUNCTION OF CERVICAL REGION: Primary | ICD-10-CM

## 2021-03-11 DIAGNOSIS — M99.02 SEGMENTAL AND SOMATIC DYSFUNCTION OF THORACIC REGION: ICD-10-CM

## 2021-03-11 DIAGNOSIS — M54.2 CERVICALGIA: ICD-10-CM

## 2021-03-11 PROCEDURE — 98941 CHIROPRACT MANJ 3-4 REGIONS: CPT | Mod: AT | Performed by: CHIROPRACTOR

## 2021-03-15 NOTE — PROGRESS NOTES
Subjective Finding:    Chief compalint: Patient presents with:  Neck Pain  , Pain Scale: 4/10, Intensity: sharp, Duration: 2 months, Radiating: no.    Date of injury:     Activities that the pain restricts:   Home/household/hobbies/social activities: yes.  Work duties: yes.  Sleep: yes.  Makes symptoms better: rest.  Makes symptoms worse: activity.  Have you seen anyone else for the symptoms? No.  Work related: no.  Automobile related injury: no.    Objective and Assessment:    Posture Analysis:   High shoulder: .  Head tilt: .  High iliac crest: .  Head carriage: neutral.  Thoracic Kyphosis: neutral.  Lumbar Lordosis: neutral.    Lumbar Range of Motion: .  Cervical Range of Motion: extension decreased, left lateral flexion decreased and right lateral flexion decreased.  Thoracic Range of Motion: .  Extremity Range of Motion: .    Palpation:   Traps: sharp pain, referred pain: no    Segmental dysfunction pre-treatment and treatment area: C5, C6, C7 and T4.  L5    Assessment post-treatment:  Cervical: ROM increased.  Thoracic: ROM increased.  Lumbar: .    Comments: .      Complicating Factors: .    Procedure(s):  CMT:  32238 Chiropractic manipulative treatment 1-2 regions performed   Cervical: Diversified, See above for level, Supine and Thoracic: Diversified, See above for level, Prone    Modalities:  None performed this visit    Therapeutic procedures:  None    Plan:  Treatment plan: PRN.  Instructed patient: stretch as instructed at visit.  Short term goals: reduce pain.  Long term goals: increase ADL.  Prognosis: excellent.

## 2021-03-18 ENCOUNTER — OFFICE VISIT (OUTPATIENT)
Dept: CHIROPRACTIC MEDICINE | Facility: OTHER | Age: 31
End: 2021-03-18
Attending: CHIROPRACTOR
Payer: COMMERCIAL

## 2021-03-18 DIAGNOSIS — M99.03 SEGMENTAL AND SOMATIC DYSFUNCTION OF LUMBAR REGION: ICD-10-CM

## 2021-03-18 DIAGNOSIS — M54.2 CERVICALGIA: ICD-10-CM

## 2021-03-18 DIAGNOSIS — M99.01 SEGMENTAL AND SOMATIC DYSFUNCTION OF CERVICAL REGION: Primary | ICD-10-CM

## 2021-03-18 DIAGNOSIS — M99.02 SEGMENTAL AND SOMATIC DYSFUNCTION OF THORACIC REGION: ICD-10-CM

## 2021-03-18 PROCEDURE — 98941 CHIROPRACT MANJ 3-4 REGIONS: CPT | Mod: AT | Performed by: CHIROPRACTOR

## 2021-03-19 NOTE — PROGRESS NOTES
Subjective Finding:    Chief compalint: Patient presents with:  Neck Pain  Back Pain  , Pain Scale: 4/10, Intensity: sharp, Duration: 2 months, Radiating: no.    Date of injury:     Activities that the pain restricts:   Home/household/hobbies/social activities: yes.  Work duties: yes.  Sleep: yes.  Makes symptoms better: rest.  Makes symptoms worse: activity.  Have you seen anyone else for the symptoms? No.  Work related: no.  Automobile related injury: no.    Objective and Assessment:    Posture Analysis:   High shoulder: .  Head tilt: .  High iliac crest: .  Head carriage: neutral.  Thoracic Kyphosis: neutral.  Lumbar Lordosis: neutral.    Lumbar Range of Motion: .  Cervical Range of Motion: extension decreased, left lateral flexion decreased and right lateral flexion decreased.  Thoracic Range of Motion: .  Extremity Range of Motion: .    Palpation:   Traps: sharp pain, referred pain: no    Segmental dysfunction pre-treatment and treatment area: C5, C6, C7 and T4.  L5    Assessment post-treatment:  Cervical: ROM increased.  Thoracic: ROM increased.  Lumbar: .    Comments: .      Complicating Factors: .    Procedure(s):  CMT:  91562 Chiropractic manipulative treatment 1-2 regions performed   Cervical: Diversified, See above for level, Supine and Thoracic: Diversified, See above for level, Prone    Modalities:  None performed this visit    Therapeutic procedures:  None    Plan:  Treatment plan: PRN.  Instructed patient: stretch as instructed at visit.  Short term goals: reduce pain.  Long term goals: increase ADL.  Prognosis: excellent.

## 2021-03-25 ENCOUNTER — OFFICE VISIT (OUTPATIENT)
Dept: CHIROPRACTIC MEDICINE | Facility: OTHER | Age: 31
End: 2021-03-25
Attending: CHIROPRACTOR
Payer: COMMERCIAL

## 2021-03-25 DIAGNOSIS — M99.03 SEGMENTAL AND SOMATIC DYSFUNCTION OF LUMBAR REGION: ICD-10-CM

## 2021-03-25 DIAGNOSIS — M54.2 CERVICALGIA: ICD-10-CM

## 2021-03-25 DIAGNOSIS — M99.02 SEGMENTAL AND SOMATIC DYSFUNCTION OF THORACIC REGION: ICD-10-CM

## 2021-03-25 DIAGNOSIS — M99.01 SEGMENTAL AND SOMATIC DYSFUNCTION OF CERVICAL REGION: Primary | ICD-10-CM

## 2021-03-25 PROCEDURE — 98941 CHIROPRACT MANJ 3-4 REGIONS: CPT | Mod: AT | Performed by: CHIROPRACTOR

## 2021-03-25 NOTE — PROGRESS NOTES
Subjective Finding:    Chief compalint: Patient presents with:  Neck Pain  Back Pain  , Pain Scale: 4/10, Intensity: sharp, Duration: 2 months, Radiating: no.    Date of injury:     Activities that the pain restricts:   Home/household/hobbies/social activities: yes.  Work duties: yes.  Sleep: yes.  Makes symptoms better: rest.  Makes symptoms worse: activity.  Have you seen anyone else for the symptoms? No.  Work related: no.  Automobile related injury: no.    Objective and Assessment:    Posture Analysis:   High shoulder: .  Head tilt: .  High iliac crest: .  Head carriage: neutral.  Thoracic Kyphosis: neutral.  Lumbar Lordosis: neutral.    Lumbar Range of Motion: .  Cervical Range of Motion: extension decreased, left lateral flexion decreased and right lateral flexion decreased.  Thoracic Range of Motion: .  Extremity Range of Motion: .    Palpation:   Traps: sharp pain, referred pain: no    Segmental dysfunction pre-treatment and treatment area: C5, C6, C7 and T4.  L5    Assessment post-treatment:  Cervical: ROM increased.  Thoracic: ROM increased.  Lumbar: .    Comments: .      Complicating Factors: .    Procedure(s):  CMT:  52935 Chiropractic manipulative treatment 1-2 regions performed   Cervical: Diversified, See above for level, Supine and Thoracic: Diversified, See above for level, Prone    Modalities:  None performed this visit    Therapeutic procedures:  None    Plan:  Treatment plan: PRN.  Instructed patient: stretch as instructed at visit.  Short term goals: reduce pain.  Long term goals: increase ADL.  Prognosis: excellent.

## 2021-05-26 DIAGNOSIS — E03.9 HYPOTHYROIDISM, UNSPECIFIED TYPE: ICD-10-CM

## 2021-05-26 DIAGNOSIS — E06.3 HASHIMOTO'S THYROIDITIS: Primary | ICD-10-CM

## 2021-06-29 ENCOUNTER — OFFICE VISIT (OUTPATIENT)
Dept: FAMILY MEDICINE | Facility: OTHER | Age: 31
End: 2021-06-29
Attending: NURSE PRACTITIONER
Payer: COMMERCIAL

## 2021-06-29 VITALS
WEIGHT: 249 LBS | HEART RATE: 79 BPM | OXYGEN SATURATION: 98 % | HEIGHT: 74 IN | BODY MASS INDEX: 31.95 KG/M2 | TEMPERATURE: 97.8 F | RESPIRATION RATE: 18 BRPM | SYSTOLIC BLOOD PRESSURE: 130 MMHG | DIASTOLIC BLOOD PRESSURE: 78 MMHG

## 2021-06-29 DIAGNOSIS — E03.9 HYPOTHYROIDISM, UNSPECIFIED TYPE: ICD-10-CM

## 2021-06-29 DIAGNOSIS — E55.9 VITAMIN D DEFICIENCY: ICD-10-CM

## 2021-06-29 DIAGNOSIS — E06.3 HASHIMOTO'S THYROIDITIS: Primary | ICD-10-CM

## 2021-06-29 PROCEDURE — 84439 ASSAY OF FREE THYROXINE: CPT | Performed by: NURSE PRACTITIONER

## 2021-06-29 PROCEDURE — 84443 ASSAY THYROID STIM HORMONE: CPT | Performed by: NURSE PRACTITIONER

## 2021-06-29 PROCEDURE — 36415 COLL VENOUS BLD VENIPUNCTURE: CPT | Performed by: NURSE PRACTITIONER

## 2021-06-29 PROCEDURE — 99213 OFFICE O/P EST LOW 20 MIN: CPT | Performed by: NURSE PRACTITIONER

## 2021-06-29 RX ORDER — DEXTROAMPHETAMINE SACCHARATE, AMPHETAMINE ASPARTATE, DEXTROAMPHETAMINE SULFATE AND AMPHETAMINE SULFATE 3.75; 3.75; 3.75; 3.75 MG/1; MG/1; MG/1; MG/1
TABLET ORAL
COMMUNITY
Start: 2021-04-21 | End: 2021-11-26

## 2021-06-29 ASSESSMENT — PAIN SCALES - GENERAL: PAINLEVEL: NO PAIN (0)

## 2021-06-29 ASSESSMENT — MIFFLIN-ST. JEOR: SCORE: 2154.21

## 2021-06-29 NOTE — PROGRESS NOTES
Assessment & Plan       Hashimoto's thyroiditis  - TSH with free T4 reflex    Hypothyroidism, unspecified type  - Continue plan of care    Vitamin D deficiency  - D3 5000 U OTC      Return in about 6 months (around 12/29/2021).      Yeimy Corona CNP  [facility] - [place] Brunner is a 31 year old who presents for the following health issues         Hashimoto's Thyroiditis Follow-up    Since last visit, patient describes the following symptoms: Weight stable, no hair loss, no skin changes, no constipation, no loose stools      Vitamin D deficiency - not consistent with D3        Patient Active Problem List   Diagnosis     ACP (advance care planning)     Vitamin D deficiency     Reactive airway disease that is not asthma     Hashimoto's thyroiditis     Past Surgical History:   Procedure Laterality Date     ORTHOPEDIC SURGERY  2015    r shoulder teat bone spur     ORTHOPEDIC SURGERY  2-2016    AC joint right     ORTHOPEDIC SURGERY  2016    Rt labrial tear     ORTHOPEDIC SURGERY  06/2017    revision decompression subpectoral biceps tenodesis        Social History     Tobacco Use     Smoking status: Never Smoker     Smokeless tobacco: Current User   Substance Use Topics     Alcohol use: Yes     Comment: occ     Family History   Problem Relation Age of Onset     Diabetes Mother      Hypertension Mother      Hyperlipidemia Mother      Coronary Artery Disease Father      Hyperlipidemia Father      Hypertension Father      Thyroid Disease No family hx of            Current Outpatient Medications   Medication Sig Dispense Refill     ADDERALL XR 30 MG 24 hr capsule TK 1 C PO BID  0     amphetamine-dextroamphetamine (ADDERALL) 15 MG tablet TAKE 1 TABLET BY MOUTH TWICE DAILY AS DIRECTED       levothyroxine (SYNTHROID/LEVOTHROID) 25 MCG tablet Take 1 tablet (25 mcg) by mouth daily 90 tablet 1       Allergies   Allergen Reactions     Morphine Sulfate Rash     Cats Other (See Comments)     Other  "reaction(s): Eye Irritation, Sneezing     Dogs Other (See Comments)     Other reaction(s): Eye Irritation, Sneezing     Morphine Hives         Recent Labs   Lab Test 02/22/21  1433 08/28/18  1001 11/17/17  0000 11/17/17 10/24/17  1439 06/08/17  0922 05/15/17  1630 02/13/17  1020 11/07/16  1403 02/02/16  1519 02/02/16  1519   A1C  --   --   --   --  5.1  --   --   --   --   --   --    LDL  --   --   --   --   --   --  125* 107* 103*   < >  --    HDL  --   --   --   --   --   --  62 46 47   < >  --    TRIG  --   --   --   --   --   --  136 147 167*   < >  --    ALT 69  --   --   --   --  82*  --   --   --   --  78*   CR 1.06  --   --  0.98 0.95 0.99 0.87 0.86 1.01   < > 1.19   GFRESTIMATED >90  --   --   --  >90 >90  Non  GFR Calc   >90  Non  GFR Calc   >90  Non  GFR Calc   89   < > 74   GFRESTBLACK >90  --   --   --  >90 >90   GFR Calc   >90   GFR Calc   >90   GFR Calc   >90   GFR Calc     < > 89   POTASSIUM 3.9  --   --  3.7 3.9 4.3 3.7 4.6 3.9   < > 3.8   TSH 7.80* 5.49*   < >  --  1.94  --  3.08 1.92 2.86   < > 4.41*    < > = values in this interval not displayed.        BP Readings from Last 3 Encounters:   06/29/21 130/78   02/22/21 136/78   02/24/20 152/86    Wt Readings from Last 3 Encounters:   06/29/21 112.9 kg (249 lb)   02/22/21 111.1 kg (245 lb)   02/24/20 111.1 kg (245 lb)              Review of Systems   Constitutional, HEENT, cardiovascular, pulmonary, gi and gu systems are negative, except as otherwise noted.        Objective    /78 (BP Location: Left arm, Patient Position: Sitting, Cuff Size: Adult Large)   Pulse 79   Temp 97.8  F (36.6  C) (Tympanic)   Resp 18   Ht 1.88 m (6' 2\")   Wt 112.9 kg (249 lb)   SpO2 98%   BMI 31.97 kg/m    Body mass index is 31.97 kg/m .       Physical Exam   GENERAL: healthy, alert and no distress  NECK: no adenopathy, no asymmetry, masses, or scars " and thyroid normal to palpation  RESP: lungs clear to auscultation - no rales, rhonchi or wheezes  CV: regular rate and rhythm, normal S1 S2, no S3 or S4, no murmur, click or rub, no peripheral edema and peripheral pulses strong  SKIN: no suspicious lesions or rashes  PSYCH: mentation appears normal, affect normal/bright

## 2021-06-29 NOTE — PATIENT INSTRUCTIONS
Assessment & Plan       Hashimoto's thyroiditis  - TSH with free T4 reflex    Hypothyroidism, unspecified type  - Continue plan of care    Vitamin D deficiency  - D3 5000 U OTC      Return in about 6 months (around 12/29/2021).      Yeimy Corona CNP  [facility] [place] [facility]

## 2021-06-29 NOTE — NURSING NOTE
"Chief Complaint   Patient presents with     Thyroid Problem       Initial /78 (BP Location: Left arm, Patient Position: Sitting, Cuff Size: Adult Large)   Pulse 79   Temp 97.8  F (36.6  C) (Tympanic)   Resp 18   Ht 1.88 m (6' 2\")   Wt 112.9 kg (249 lb)   SpO2 98%   BMI 31.97 kg/m   Estimated body mass index is 31.97 kg/m  as calculated from the following:    Height as of this encounter: 1.88 m (6' 2\").    Weight as of this encounter: 112.9 kg (249 lb).  Medication Reconciliation: complete  Sydnie Lo LPN  "

## 2021-06-30 LAB
T4 FREE SERPL-MCNC: 0.92 NG/DL (ref 0.76–1.46)
TSH SERPL DL<=0.005 MIU/L-ACNC: 6.08 MU/L (ref 0.4–4)

## 2021-07-06 ENCOUNTER — TELEPHONE (OUTPATIENT)
Dept: FAMILY MEDICINE | Facility: OTHER | Age: 31
End: 2021-07-06

## 2021-07-06 NOTE — TELEPHONE ENCOUNTER
"Pt called with questions about recent TSH. Pt wondering if synthroid dosage should be adjusted due to high level. Currently takes 25mcg. Pt notes that he is \"tired all time\" and feels that this is related to his thyroid. Please advise. Thank you!    TSH   Date Value Ref Range Status   06/29/2021 6.08 (H) 0.40 - 4.00 mU/L Final       "

## 2021-08-23 DIAGNOSIS — E03.9 HYPOTHYROIDISM, UNSPECIFIED TYPE: ICD-10-CM

## 2021-08-23 RX ORDER — LEVOTHYROXINE SODIUM 25 UG/1
TABLET ORAL
Qty: 90 TABLET | Refills: 1 | Status: SHIPPED | OUTPATIENT
Start: 2021-08-23 | End: 2022-02-28

## 2021-10-03 ENCOUNTER — HEALTH MAINTENANCE LETTER (OUTPATIENT)
Age: 31
End: 2021-10-03

## 2021-10-13 ENCOUNTER — TELEPHONE (OUTPATIENT)
Dept: FAMILY MEDICINE | Facility: OTHER | Age: 31
End: 2021-10-13
Payer: COMMERCIAL

## 2021-10-13 NOTE — TELEPHONE ENCOUNTER
ca10:31 AM    Reason for Call: Phone Call    Description: Patient would like a Referral for Neurologist  Call patient if questions and when referral is done.    Was an appointment offered for this call? No  If yes : Appointment type              Date    Preferred method for responding to this message: Telephone Call  What is your phone number ?  [phone]    If we cannot reach you directly, may we leave a detailed response at the number you provided? Yes    Can this message wait until your PCP/provider returns, if available today?  Provider is in    Dania Martinez

## 2021-11-26 ENCOUNTER — OFFICE VISIT (OUTPATIENT)
Dept: FAMILY MEDICINE | Facility: OTHER | Age: 31
End: 2021-11-26
Attending: NURSE PRACTITIONER
Payer: COMMERCIAL

## 2021-11-26 VITALS
HEIGHT: 74 IN | HEART RATE: 104 BPM | RESPIRATION RATE: 18 BRPM | OXYGEN SATURATION: 97 % | WEIGHT: 236 LBS | BODY MASS INDEX: 30.29 KG/M2 | DIASTOLIC BLOOD PRESSURE: 78 MMHG | SYSTOLIC BLOOD PRESSURE: 144 MMHG

## 2021-11-26 DIAGNOSIS — E03.9 ACQUIRED HYPOTHYROIDISM: Primary | ICD-10-CM

## 2021-11-26 DIAGNOSIS — M53.3 SI (SACROILIAC) JOINT DYSFUNCTION: ICD-10-CM

## 2021-11-26 DIAGNOSIS — Z71.89 ACP (ADVANCE CARE PLANNING): ICD-10-CM

## 2021-11-26 DIAGNOSIS — M54.50 BILATERAL LOW BACK PAIN WITHOUT SCIATICA, UNSPECIFIED CHRONICITY: ICD-10-CM

## 2021-11-26 LAB
T4 FREE SERPL-MCNC: 1.14 NG/DL (ref 0.76–1.46)
TSH SERPL DL<=0.005 MIU/L-ACNC: 4.64 MU/L (ref 0.4–4)

## 2021-11-26 PROCEDURE — 36415 COLL VENOUS BLD VENIPUNCTURE: CPT | Performed by: NURSE PRACTITIONER

## 2021-11-26 PROCEDURE — 84439 ASSAY OF FREE THYROXINE: CPT | Performed by: NURSE PRACTITIONER

## 2021-11-26 PROCEDURE — 84443 ASSAY THYROID STIM HORMONE: CPT | Performed by: NURSE PRACTITIONER

## 2021-11-26 PROCEDURE — 99214 OFFICE O/P EST MOD 30 MIN: CPT | Performed by: NURSE PRACTITIONER

## 2021-11-26 RX ORDER — CYCLOBENZAPRINE HCL 10 MG
10 TABLET ORAL 2 TIMES DAILY PRN
Qty: 60 TABLET | Refills: 1 | Status: SHIPPED | OUTPATIENT
Start: 2021-11-26 | End: 2022-12-28

## 2021-11-26 ASSESSMENT — ANXIETY QUESTIONNAIRES
6. BECOMING EASILY ANNOYED OR IRRITABLE: NOT AT ALL
7. FEELING AFRAID AS IF SOMETHING AWFUL MIGHT HAPPEN: NOT AT ALL
GAD7 TOTAL SCORE: 0
2. NOT BEING ABLE TO STOP OR CONTROL WORRYING: NOT AT ALL
1. FEELING NERVOUS, ANXIOUS, OR ON EDGE: NOT AT ALL
3. WORRYING TOO MUCH ABOUT DIFFERENT THINGS: NOT AT ALL
IF YOU CHECKED OFF ANY PROBLEMS ON THIS QUESTIONNAIRE, HOW DIFFICULT HAVE THESE PROBLEMS MADE IT FOR YOU TO DO YOUR WORK, TAKE CARE OF THINGS AT HOME, OR GET ALONG WITH OTHER PEOPLE: NOT DIFFICULT AT ALL
5. BEING SO RESTLESS THAT IT IS HARD TO SIT STILL: NOT AT ALL

## 2021-11-26 ASSESSMENT — PAIN SCALES - GENERAL: PAINLEVEL: EXTREME PAIN (8)

## 2021-11-26 ASSESSMENT — PATIENT HEALTH QUESTIONNAIRE - PHQ9: 5. POOR APPETITE OR OVEREATING: NOT AT ALL

## 2021-11-26 ASSESSMENT — MIFFLIN-ST. JEOR: SCORE: 2095.24

## 2021-11-26 NOTE — PROGRESS NOTES
Assessment & Plan        Acquired hypothyroidism  - TSH with free T4 reflex    ACP (advance care planning)  - Discussed    Bilateral low back pain without sciatica, unspecified chronicity  - MR Lumbar Spine w/o Contrast; Future  - IR Consultation for IR Exam (Pain Consult); Future  - cyclobenzaprine (FLEXERIL) 10 MG tablet; Take 1 tablet (10 mg) by mouth 2 times daily as needed for muscle spasms    SI (sacroiliac) joint dysfunction  - MR Lumbar Spine w/o Contrast; Future  - IR Consultation for IR Exam (Pain Consult); Future  - cyclobenzaprine (FLEXERIL) 10 MG tablet; Take 1 tablet (10 mg) by mouth 2 times daily as needed for muscle spasms      Ice  Voltaren Gel OTC PRN  Ibuprofen or Aleve      Yeimy Corona CNP  [facility] - [place] Brunner is a 31 year old who presents for the following health issues       Hypothyroidism Follow-up    Since last visit, patient describes the following symptoms: Weight stable, no hair loss, no skin changes, no constipation, no loose stools        Concern - Back Pain  Onset: 2 weeks   Description: bilateral low back pain  Intensity: moderate-severe  Progression of Symptoms:  worsening  Accompanying Signs & Symptoms: bilateral low back pain, radiates into the buttocks and down the legs, nausea from pain  Previous history of similar problem: has been seen for it in the past   Precipitating factors:        Worsened by: bending  Alleviating factors:        Improved by: Movement seems to help   Therapies tried and outcome: tylenol, IBU, ice- minimal relief.  Heat- not effective. Chiropractor- sees Dr Devich, minimal relief         PHQ 8/28/2018 2/22/2021 11/26/2021   PHQ-9 Total Score 6 2 0   Q9: Thoughts of better off dead/self-harm past 2 weeks Not at all Not at all Not at all       ÁLVARO-7 SCORE 8/28/2018 2/22/2021 11/26/2021   Total Score 3 2 0         Patient Active Problem List   Diagnosis     ACP (advance care planning)     Vitamin D deficiency     Reactive  airway disease that is not asthma     Hashimoto's thyroiditis     Acquired hypothyroidism     Past Surgical History:   Procedure Laterality Date     ORTHOPEDIC SURGERY  2015    r shoulder teat bone spur     ORTHOPEDIC SURGERY  2-2016    AC joint right     ORTHOPEDIC SURGERY  2016    Rt labrial tear     ORTHOPEDIC SURGERY  06/2017    revision decompression subpectoral biceps tenodesis        Social History     Tobacco Use     Smoking status: Never Smoker     Smokeless tobacco: Current User   Substance Use Topics     Alcohol use: Yes     Comment: occ     Family History   Problem Relation Age of Onset     Diabetes Mother      Hypertension Mother      Hyperlipidemia Mother      Coronary Artery Disease Father      Hyperlipidemia Father      Hypertension Father      Thyroid Disease No family hx of            Current Outpatient Medications   Medication Sig Dispense Refill     ADDERALL XR 30 MG 24 hr capsule TK 1 C PO BID  0     levothyroxine (SYNTHROID/LEVOTHROID) 25 MCG tablet TAKE 1 TABLET(25 MCG) BY MOUTH DAILY 90 tablet 1       Allergies   Allergen Reactions     Morphine Sulfate Rash     Cats Other (See Comments)     Other reaction(s): Eye Irritation, Sneezing     Dogs Other (See Comments)     Other reaction(s): Eye Irritation, Sneezing     Morphine Hives       Recent Labs   Lab Test 06/29/21  1537 02/22/21  1433 03/02/18  0902 11/17/17  0000 10/24/17  1439 06/08/17  0922 05/15/17  1630 02/13/17  1020 11/07/16  1403 03/31/16  0858 02/02/16  1519   A1C  --   --   --   --  5.1  --   --   --   --   --   --    LDL  --   --   --   --   --   --  125* 107* 103*   < >  --    HDL  --   --   --   --   --   --  62 46 47   < >  --    TRIG  --   --   --   --   --   --  136 147 167*   < >  --    ALT  --  69  --   --   --  82*  --   --   --   --  78*   CR  --  1.06  --  0.98 0.95 0.99 0.87 0.86 1.01   < > 1.19   GFRESTIMATED  --  >90  --   --  >90 >90  Non  GFR Calc   >90  Non  GFR Calc   >90  Non  " GFR Calc   89   < > 74   GFRESTBLACK  --  >90  --   --  >90 >90   GFR Calc   >90   GFR Calc   >90   GFR Calc   >90   GFR Calc     < > 89   POTASSIUM  --  3.9  --  3.7 3.9 4.3 3.7 4.6 3.9   < > 3.8   TSH 6.08* 7.80*   < >  --  1.94  --  3.08 1.92 2.86   < > 4.41*    < > = values in this interval not displayed.        BP Readings from Last 3 Encounters:   11/26/21 (!) 144/78   06/29/21 130/78   02/22/21 136/78    Wt Readings from Last 3 Encounters:   11/26/21 107 kg (236 lb)   06/29/21 112.9 kg (249 lb)   02/22/21 111.1 kg (245 lb)              Review of Systems   Constitutional, HEENT, cardiovascular, pulmonary, gi and gu systems are negative, except as otherwise noted.        Objective    BP (!) 144/78   Pulse 104   Resp 18   Ht 1.88 m (6' 2\")   Wt 107 kg (236 lb)   SpO2 97%   BMI 30.30 kg/m    Body mass index is 30.3 kg/m .       Physical Exam   GENERAL: healthy, alert and no distress  EYES: Eyes grossly normal to inspection, PERRL and conjunctivae and sclerae normal  NECK: no adenopathy, no asymmetry, masses, or scars and thyroid normal to palpation  RESP: lungs clear to auscultation - no rales, rhonchi or wheezes  CV: regular rate and rhythm, normal S1 S2, no S3 or S4, no murmur, click or rub, no peripheral edema and peripheral pulses strong  MS: Lumbar back pain - paraspinal tenderness - radiation of pain to bilateral SI joints  SKIN: no suspicious lesions or rashes  PSYCH: mentation appears normal, affect normal/bright            "

## 2021-11-26 NOTE — NURSING NOTE
"Chief Complaint   Patient presents with     Back Pain       Initial BP (!) 144/78   Pulse 104   Resp 18   Ht 1.88 m (6' 2\")   Wt 107 kg (236 lb)   SpO2 97%   BMI 30.30 kg/m   Estimated body mass index is 30.3 kg/m  as calculated from the following:    Height as of this encounter: 1.88 m (6' 2\").    Weight as of this encounter: 107 kg (236 lb).  Medication Reconciliation: complete  Sheila Abreu LPN  "

## 2021-11-26 NOTE — PATIENT INSTRUCTIONS
Assessment & Plan        Acquired hypothyroidism  - TSH with free T4 reflex    ACP (advance care planning)  - Discussed    Bilateral low back pain without sciatica, unspecified chronicity  - MR Lumbar Spine w/o Contrast; Future  - IR Consultation for IR Exam (Pain Consult); Future  - cyclobenzaprine (FLEXERIL) 10 MG tablet; Take 1 tablet (10 mg) by mouth 2 times daily as needed for muscle spasms    SI (sacroiliac) joint dysfunction  - MR Lumbar Spine w/o Contrast; Future  - IR Consultation for IR Exam (Pain Consult); Future  - cyclobenzaprine (FLEXERIL) 10 MG tablet; Take 1 tablet (10 mg) by mouth 2 times daily as needed for muscle spasms      Ice  Voltaren Gel OTC PRN  Ibuprofen or Jose Corona CNP  [facility]

## 2021-11-27 ASSESSMENT — ANXIETY QUESTIONNAIRES: GAD7 TOTAL SCORE: 0

## 2021-11-27 ASSESSMENT — PATIENT HEALTH QUESTIONNAIRE - PHQ9: SUM OF ALL RESPONSES TO PHQ QUESTIONS 1-9: 0

## 2021-12-07 ENCOUNTER — HOSPITAL ENCOUNTER (OUTPATIENT)
Dept: MRI IMAGING | Facility: HOSPITAL | Age: 31
Discharge: HOME | End: 2021-12-07
Attending: NURSE PRACTITIONER | Admitting: NURSE PRACTITIONER
Payer: COMMERCIAL

## 2021-12-07 DIAGNOSIS — M54.50 BILATERAL LOW BACK PAIN WITHOUT SCIATICA, UNSPECIFIED CHRONICITY: ICD-10-CM

## 2021-12-07 DIAGNOSIS — M53.3 SI (SACROILIAC) JOINT DYSFUNCTION: ICD-10-CM

## 2021-12-07 PROCEDURE — 72148 MRI LUMBAR SPINE W/O DYE: CPT

## 2021-12-08 ENCOUNTER — HOSPITAL ENCOUNTER (OUTPATIENT)
Dept: INTERVENTIONAL RADIOLOGY/VASCULAR | Facility: HOSPITAL | Age: 31
Discharge: HOME | End: 2021-12-08
Attending: NURSE PRACTITIONER | Admitting: RADIOLOGY
Payer: COMMERCIAL

## 2021-12-08 DIAGNOSIS — M54.50 BILATERAL LOW BACK PAIN WITHOUT SCIATICA, UNSPECIFIED CHRONICITY: ICD-10-CM

## 2021-12-08 DIAGNOSIS — M53.3 SI (SACROILIAC) JOINT DYSFUNCTION: ICD-10-CM

## 2021-12-08 PROCEDURE — G0463 HOSPITAL OUTPT CLINIC VISIT: HCPCS

## 2021-12-20 ENCOUNTER — HOSPITAL ENCOUNTER (OUTPATIENT)
Facility: HOSPITAL | Age: 31
Discharge: HOME | End: 2021-12-20
Attending: NURSE PRACTITIONER | Admitting: RADIOLOGY
Payer: COMMERCIAL

## 2021-12-20 ENCOUNTER — HOSPITAL ENCOUNTER (OUTPATIENT)
Dept: INTERVENTIONAL RADIOLOGY/VASCULAR | Facility: HOSPITAL | Age: 31
End: 2021-12-20
Attending: NURSE PRACTITIONER
Payer: COMMERCIAL

## 2021-12-20 DIAGNOSIS — M47.816 LUMBAR SPONDYLOSIS: ICD-10-CM

## 2021-12-20 DIAGNOSIS — M25.552 BILATERAL HIP PAIN: ICD-10-CM

## 2021-12-20 DIAGNOSIS — M79.18 BUTTOCK PAIN: ICD-10-CM

## 2021-12-20 DIAGNOSIS — M25.551 BILATERAL HIP PAIN: ICD-10-CM

## 2021-12-20 DIAGNOSIS — N50.811 PAIN IN RIGHT TESTICLE: ICD-10-CM

## 2021-12-20 PROCEDURE — 76000 FLUOROSCOPY <1 HR PHYS/QHP: CPT

## 2021-12-30 ENCOUNTER — HOSPITAL ENCOUNTER (OUTPATIENT)
Dept: MRI IMAGING | Facility: HOSPITAL | Age: 31
Discharge: HOME | End: 2021-12-30
Attending: NURSE PRACTITIONER | Admitting: NURSE PRACTITIONER
Payer: COMMERCIAL

## 2021-12-30 DIAGNOSIS — M25.552 BILATERAL HIP PAIN: ICD-10-CM

## 2021-12-30 DIAGNOSIS — N50.811 PAIN IN RIGHT TESTICLE: ICD-10-CM

## 2021-12-30 DIAGNOSIS — M79.18 BUTTOCK PAIN: ICD-10-CM

## 2021-12-30 DIAGNOSIS — M25.551 BILATERAL HIP PAIN: ICD-10-CM

## 2021-12-30 PROCEDURE — 72195 MRI PELVIS W/O DYE: CPT

## 2022-01-11 ENCOUNTER — TELEPHONE (OUTPATIENT)
Dept: INTERVENTIONAL RADIOLOGY/VASCULAR | Facility: HOSPITAL | Age: 32
End: 2022-01-11
Payer: COMMERCIAL

## 2022-01-23 ENCOUNTER — HEALTH MAINTENANCE LETTER (OUTPATIENT)
Age: 32
End: 2022-01-23

## 2022-02-09 DIAGNOSIS — N50.811 PAIN IN RIGHT TESTICLE: ICD-10-CM

## 2022-02-09 DIAGNOSIS — M47.816 LUMBAR SPONDYLOSIS: Primary | ICD-10-CM

## 2022-02-09 DIAGNOSIS — M54.50 BILATERAL LOW BACK PAIN WITHOUT SCIATICA, UNSPECIFIED CHRONICITY: ICD-10-CM

## 2022-02-09 DIAGNOSIS — M53.3 SI (SACROILIAC) JOINT DYSFUNCTION: ICD-10-CM

## 2022-02-23 ENCOUNTER — HOSPITAL ENCOUNTER (OUTPATIENT)
Dept: PHYSICAL THERAPY | Facility: OTHER | Age: 32
End: 2022-02-23
Attending: NURSE PRACTITIONER
Payer: COMMERCIAL

## 2022-02-23 PROCEDURE — 97530 THERAPEUTIC ACTIVITIES: CPT | Mod: GP

## 2022-02-23 PROCEDURE — 97162 PT EVAL MOD COMPLEX 30 MIN: CPT | Mod: GP

## 2022-02-23 NOTE — PROGRESS NOTES
"   02/23/22 1001   General Information   Type of Visit Initial OP Ortho PT Evaluation   Start of Care Date 02/23/22   Referring Physician Yeimy Corona NP   Patient/Family Goals Statement Lessen and or resolve gluteal/hip pain   Orders Evaluate and Treat   Date of Order 02/09/22   Certification Required? No   Medical Diagnosis Bilateral low back pain, SI joint dysfunction, lumbar spondylosis   Surgical/Medical history reviewed Yes   Precautions/Limitations no known precautions/limitations   General Information Comments Past medical history-see chart   Body Part(s)   Body Part(s) Lumbar Spine/SI   Presentation and Etiology   Pertinent history of current problem (include personal factors and/or comorbidities that impact the POC) Patient reports onset of present symptoms approximately 2-1/2 years ago.  He does not recall any injuries or changes in activity at that time.  He does report that he had lost approximately 40 pounds in 5 months time after quitting alcohol use.  Patient reports he has tried several chiropractic sessions with few different chiropractors with treatments primary care consisting of adjustments.  He has tried some stretching on his own.  He does workout regularly.  He also tried approximately 10 sessions of massage therapy.  He reports the chiropractic and massage therapy have helped however symptoms return.  He also tried has tried \"rolling on a ball \"in the gluteal region which does help.  He was recently seen by his primary care provider and is now referred to physical therapy.  He did have some diagnostic testing performed which was significant for mild edema involving the origin of the right gluteus minimus muscle, mild degenerative disc disease with mild multifacet degenerative change, a small posterior central disc protrusion at L5-S1 with annular fissuring.  Radiology recommendations were to initially try physical therapy.  If physical therapy is not helpful further considerations will be " "epidural injections, medial branch blocks and work-up toward rhizotomies.   Impairments A. Pain;D. Decreased ROM;E. Decreased flexibility;F. Decreased strength and endurance;J. Burning;K. Numbness;L. Tingling   Functional Limitations perform activities of daily living;perform required work activities;perform desired leisure / sports activities   Symptom Location Patient reports chief complaint of pain is along the lateral hip/gluteal region with radiation of pain into the anterior hip and groin/testicle.  He notes the pain bilaterally however significantly more on the right side.  He also reports \"spasms \"in the bilateral gluteal regions he has noted some intermittent pain along the posterior thigh to the knee level.   How/Where did it occur From insidious onset   Chronicity Chronic   Pain rating (0-10 point scale) Best (/10);Worst (/10)   Best (/10) 5   Worst (/10) 10   Pain quality B. Dull;C. Aching;E. Shooting;F. Stabbing   Frequency of pain/symptoms A. Constant   Pain/symptoms are: The same all the time   Pain/symptoms exacerbated by A. Sitting;B. Walking;C. Lifting;D. Carrying;G. Certain positions;I. Bending;M. Other   Pain exacerbation comment Standing, sleeping, sex life   Pain/symptoms eased by D. Nothing   Progression of symptoms since onset: Worsened   Current / Previous Interventions   Diagnostic Tests: MRI   MRI Results Results   MRI results See report (mild edema involving the origin of the right gluteus minimus muscle, small posterior central disc protrusion at L5-S1 with annular fissuring, mild degenerative changes within the L3-4 L4-5 and L5-S1 facet joints   Current Level of Function   Patient role/employment history A. Employed   Employment Comments He works for the [employer]-states this is quite physical job.  He is presently off on maternity leave   Fall Risk Screen   Fall screen completed by PT   Have you fallen 2 or more times in the past year? No   Have you fallen and had an injury in the past " year? No   Is patient a fall risk? No   Abuse Screen (yes response referral indicated)   Feels Unsafe at Home or Work/School no   Feels Threatened by Someone no   Does Anyone Try to Keep You From Having Contact with Others or Doing Things Outside Your Home? no   Physical Signs of Abuse Present no   Patient needs abuse support services and resources No   Lumbar Spine/SI Objective Findings   Observation No acute distress   Posture The left iliac crest and left shoulder were elevated as compared to the right, he was noted to have bilateral forward shoulder posture and calcaneal supination and standing   Gait/Locomotion Nonantalgic   Flexion ROM 0-79 w HS pull   Extension ROM 0-33 w Ant thigh pull   Right Side Bending ROM 0-22 w CL lateral trunk pull   Left Side Bending ROM 0-28 w CL lateral trunk pull   Repeated Extension-Standing ROM Negative   Pelvic Screen Positive for a possible left on left anterior sacral torsion dysfunction   Hip Screen Right: Decreased external rotation with significant piriformis/gluteal tightness.  Left: Positive hip scour and FADIR tests and decreased hip external rotation   Lumbar/Hip/Knee/Foot Strength Comments He was able to heel and toe walk well   Hamstring Flexibility Hypomobile bilaterally   Quadricep Flexibility Hypomobile bilaterally   Piriformis Flexibility Hypomobile especially on right   SLR Negative   Prone Instability Test Negative   Crossover SLR Negative   Slump Test Negative   Segmental Mobility FRS right L5   Palpation Significant soft tissue tension and tenderness to palpation along the bilateral right significantly more so than left, piriformis and gluteal muscles.  Mild tenderness at L4-L5 paraspinals bilaterally with soft tissue tension.   Planned Therapy Interventions   Planned Therapy Interventions joint mobilization;manual therapy;ROM;strengthening;stretching   Planned Modality Interventions   Planned Modality Interventions Comments Modalities as indicated   Clinical  Impression   Criteria for Skilled Therapeutic Interventions Met yes, treatment indicated   PT Diagnosis Bilateral gluteal/hip pain   Influenced by the following impairments Pain, decreased mobility, decreased strength   Functional limitations due to impairments Sitting, walking, lifting, carrying, bending, standing, sleeping, and sex life   Clinical Presentation Evolving/Changing   Clinical Presentation Rationale Clinical judgment-progression of pain and functional limitations   Clinical Decision Making (Complexity) Moderate complexity   Therapy Frequency 2 times/Week   Predicted Duration of Therapy Intervention (days/wks) Up to 6 weeks   Risk & Benefits of therapy have been explained Yes   Patient, Family & other staff in agreement with plan of care Yes   Clinical Impression Comments Patient presents with lateral hip/gluteal pain with symptoms at times into the anterior hip and groin/testicle.  Objective findings note soft tissue restrictions, SI joint dysfunction, hip joint tightness   Education Assessment   Barriers to Learning No barriers   ORTHO GOALS   PT Ortho Eval Goals 1;2;3   Ortho Goal 1   Goal Identifier S TG 1   Goal Description Decrease pain when at its worst to a 9/10   Target Date 03/09/22   Ortho Goal 2   Goal Identifier LTG 1   Goal Description Patient to demonstrate independence with home exercise program   Target Date 04/06/22   Ortho Goal 3   Goal Identifier LTG 2   Goal Description Patient we will also stand 45-60+ minutes without significant back/gluteal pain   Target Date 04/06/22   Total Evaluation Time   PT Eval, Moderate Complexity Minutes (17148) 37

## 2022-02-25 ENCOUNTER — HOSPITAL ENCOUNTER (OUTPATIENT)
Dept: PHYSICAL THERAPY | Facility: OTHER | Age: 32
End: 2022-02-25
Attending: NURSE PRACTITIONER
Payer: COMMERCIAL

## 2022-02-25 PROCEDURE — 97140 MANUAL THERAPY 1/> REGIONS: CPT | Mod: GP

## 2022-02-27 DIAGNOSIS — E03.9 HYPOTHYROIDISM, UNSPECIFIED TYPE: ICD-10-CM

## 2022-02-27 NOTE — TELEPHONE ENCOUNTER
SYNTHROID      Last Written Prescription Date:  8-  Last Fill Quantity: 90,   # refills: 1  Last Office Visit: 11-  Future Office visit:       Routing refill request to provider for review/approval because:  Drug not on the [facility], P or [facility] refill protocol or controlled substance

## 2022-02-28 RX ORDER — LEVOTHYROXINE SODIUM 25 UG/1
TABLET ORAL
Qty: 90 TABLET | Refills: 1 | Status: SHIPPED | OUTPATIENT
Start: 2022-02-28 | End: 2023-03-03

## 2022-03-04 ENCOUNTER — HOSPITAL ENCOUNTER (OUTPATIENT)
Dept: PHYSICAL THERAPY | Facility: OTHER | Age: 32
End: 2022-03-04
Attending: NURSE PRACTITIONER
Payer: COMMERCIAL

## 2022-03-04 PROCEDURE — 97140 MANUAL THERAPY 1/> REGIONS: CPT | Mod: GP

## 2022-03-08 ENCOUNTER — HOSPITAL ENCOUNTER (OUTPATIENT)
Dept: PHYSICAL THERAPY | Facility: OTHER | Age: 32
End: 2022-03-08
Attending: NURSE PRACTITIONER
Payer: COMMERCIAL

## 2022-03-08 PROCEDURE — 97140 MANUAL THERAPY 1/> REGIONS: CPT | Mod: GP

## 2022-03-11 ENCOUNTER — HOSPITAL ENCOUNTER (OUTPATIENT)
Dept: PHYSICAL THERAPY | Facility: OTHER | Age: 32
Discharge: HOME | End: 2022-03-11
Attending: NURSE PRACTITIONER
Payer: COMMERCIAL

## 2022-03-11 PROCEDURE — 97140 MANUAL THERAPY 1/> REGIONS: CPT | Mod: GP

## 2022-03-15 ENCOUNTER — HOSPITAL ENCOUNTER (OUTPATIENT)
Dept: PHYSICAL THERAPY | Facility: OTHER | Age: 32
Discharge: HOME | End: 2022-03-15
Attending: NURSE PRACTITIONER
Payer: COMMERCIAL

## 2022-03-15 PROCEDURE — 97140 MANUAL THERAPY 1/> REGIONS: CPT | Mod: GP

## 2022-03-15 PROCEDURE — 97035 APP MDLTY 1+ULTRASOUND EA 15: CPT | Mod: GP

## 2022-03-18 ENCOUNTER — HOSPITAL ENCOUNTER (OUTPATIENT)
Dept: PHYSICAL THERAPY | Facility: OTHER | Age: 32
Discharge: HOME | End: 2022-03-18
Attending: NURSE PRACTITIONER
Payer: COMMERCIAL

## 2022-03-18 PROCEDURE — 97035 APP MDLTY 1+ULTRASOUND EA 15: CPT | Mod: GP

## 2022-03-18 PROCEDURE — 97140 MANUAL THERAPY 1/> REGIONS: CPT | Mod: GP

## 2022-03-25 ENCOUNTER — HOSPITAL ENCOUNTER (OUTPATIENT)
Dept: PHYSICAL THERAPY | Facility: OTHER | Age: 32
Discharge: HOME | End: 2022-03-25
Attending: NURSE PRACTITIONER
Payer: COMMERCIAL

## 2022-03-25 PROCEDURE — 97140 MANUAL THERAPY 1/> REGIONS: CPT | Mod: GP

## 2022-05-03 NOTE — PROGRESS NOTES
[facility]    Outpatient Physical Therapy Discharge Note  Patient: Kannan Ordonez  : 1990    Beginning/End Dates of Reporting Period:  22 to 22    Referring Provider: Yeimy Corona NP    Therapy Diagnosis: B LBP, SIJ dysfunction     Client Self Report: He RT work on 3/21/22 and is noting increased stiffness sid  along  the anterior hips. He does sit alot he states     Objective Measurements:  Objective Measure: Palpation  Details: + mild soft tissue tension R gluteal /piriformis mms, + STT and tenderness B hip flexors  Objective Measure: special tests  Details: SIJ dusfx, - hip scour and improved mobilty L hip.                              Outcome Measures (most recent score):      Goals:  Goal Identifier S TG 1   Goal Description Decrease pain when at its worst to a 9/10   Target Date 22   Date Met  22   Progress (detail required for progress note):       Goal Identifier LTG 1   Goal Description Patient to demonstrate independence with home exercise program   Target Date 22   Date Met  22   Progress (detail required for progress note):       Goal Identifier LTG 2   Goal Description Patient we will also stand 45-60+ minutes without significant back/gluteal pain   Target Date 22   Date Met      Progress (detail required for progress note): not assessed     Goal Identifier     Goal Description     Target Date     Date Met      Progress (detail required for progress note):       Goal Identifier     Goal Description     Target Date     Date Met      Progress (detail required for progress note):       Goal Identifier     Goal Description     Target Date     Date Met      Progress (detail required for progress note):       Goal Identifier     Goal Description     Target Date     Date Met      Progress (detail required for progress note):       Goal Identifier      Goal Description     Target Date     Date Met      Progress (detail required for progress note):             Plan:  Discharge from therapy.    Discharge:    Reason for Discharge: Patient has failed to schedule further appointments.    Equipment Issued: none    Discharge Plan: Patient to continue home program.

## 2022-07-14 ENCOUNTER — TELEPHONE (OUTPATIENT)
Dept: FAMILY MEDICINE | Facility: OTHER | Age: 32
End: 2022-07-14

## 2022-07-14 NOTE — TELEPHONE ENCOUNTER
12:01 PM    Reason for Call: Phone Call    Description: Patient requesting call back regarding a referral he is hoping to get for Dr Phillip at  for his back. Please return his call     Was an appointment offered for this call? No    Preferred method for responding to this message: Telephone Call  What is your phone number ?[phone]    If we cannot reach you directly, may we leave a detailed response at the number you provided? Yes    Can this message wait until your PCP/provider returns, if available today? YES    Irina Gregory

## 2022-07-14 NOTE — TELEPHONE ENCOUNTER
Could we just do a phone visit so I can get all his info for the referral?  Same day is ok.    Yeimy COLBERTJUDD  [phone]

## 2022-07-15 NOTE — TELEPHONE ENCOUNTER
Attempt # 1  Outcome: Left Message   Comment: Needs to schedule telephone visit with PCP to discuss referral

## 2022-07-18 ENCOUNTER — VIRTUAL VISIT (OUTPATIENT)
Dept: FAMILY MEDICINE | Facility: OTHER | Age: 32
End: 2022-07-18
Attending: NURSE PRACTITIONER
Payer: COMMERCIAL

## 2022-07-18 VITALS — DIASTOLIC BLOOD PRESSURE: 70 MMHG | SYSTOLIC BLOOD PRESSURE: 130 MMHG

## 2022-07-18 DIAGNOSIS — M25.551 BILATERAL HIP PAIN: ICD-10-CM

## 2022-07-18 DIAGNOSIS — M54.42 ACUTE BILATERAL LOW BACK PAIN WITH BILATERAL SCIATICA: ICD-10-CM

## 2022-07-18 DIAGNOSIS — M54.41 ACUTE BILATERAL LOW BACK PAIN WITH BILATERAL SCIATICA: ICD-10-CM

## 2022-07-18 DIAGNOSIS — E03.9 ACQUIRED HYPOTHYROIDISM: Primary | ICD-10-CM

## 2022-07-18 DIAGNOSIS — M25.552 BILATERAL HIP PAIN: ICD-10-CM

## 2022-07-18 PROCEDURE — 99213 OFFICE O/P EST LOW 20 MIN: CPT | Mod: TEL | Performed by: NURSE PRACTITIONER

## 2022-07-18 RX ORDER — DEXTROAMPHETAMINE SACCHARATE, AMPHETAMINE ASPARTATE, DEXTROAMPHETAMINE SULFATE AND AMPHETAMINE SULFATE 3.75; 3.75; 3.75; 3.75 MG/1; MG/1; MG/1; MG/1
TABLET ORAL
COMMUNITY
Start: 2022-07-01 | End: 2023-02-17 | Stop reason: ALTCHOICE

## 2022-07-18 ASSESSMENT — ASTHMA QUESTIONNAIRES: ACT_TOTALSCORE: 25

## 2022-07-18 ASSESSMENT — PATIENT HEALTH QUESTIONNAIRE - PHQ9
SUM OF ALL RESPONSES TO PHQ QUESTIONS 1-9: 0
5. POOR APPETITE OR OVEREATING: NOT AT ALL

## 2022-07-18 ASSESSMENT — ANXIETY QUESTIONNAIRES
2. NOT BEING ABLE TO STOP OR CONTROL WORRYING: NOT AT ALL
GAD7 TOTAL SCORE: 0
6. BECOMING EASILY ANNOYED OR IRRITABLE: NOT AT ALL
5. BEING SO RESTLESS THAT IT IS HARD TO SIT STILL: NOT AT ALL
GAD7 TOTAL SCORE: 0
1. FEELING NERVOUS, ANXIOUS, OR ON EDGE: NOT AT ALL
3. WORRYING TOO MUCH ABOUT DIFFERENT THINGS: NOT AT ALL
IF YOU CHECKED OFF ANY PROBLEMS ON THIS QUESTIONNAIRE, HOW DIFFICULT HAVE THESE PROBLEMS MADE IT FOR YOU TO DO YOUR WORK, TAKE CARE OF THINGS AT HOME, OR GET ALONG WITH OTHER PEOPLE: NOT DIFFICULT AT ALL
7. FEELING AFRAID AS IF SOMETHING AWFUL MIGHT HAPPEN: NOT AT ALL

## 2022-07-18 NOTE — NURSING NOTE
"Chief Complaint   Patient presents with     Back Pain       Initial There were no vitals taken for this visit. Estimated body mass index is 30.3 kg/m  as calculated from the following:    Height as of 11/26/21: 1.88 m (6' 2\").    Weight as of 11/26/21: 107 kg (236 lb).  Medication Reconciliation: complete  Sydnie Lo LPN  "

## 2022-07-18 NOTE — PROGRESS NOTES
Kannan is a 32 year old who is being evaluated via a billable telephone visit.      What phone number would you like to be contacted at? [phone]  How would you like to obtain your AVS? MyChart      Assessment & Plan        Acquired hypothyroidism  - TSH with free T4 reflex; Future    Acute bilateral low back pain with bilateral sciatica  - Neurosurgery Referral    Bilateral hip pain  - Neurosurgery Referral      Yeimy Corona, GILA  [facility] - [place] Brunner is a 32 year old, presenting for the following health issues:  Back Pain        Pain History:  When did you first notice your pain? -  Pain - Lumbar and bilateral hip - Right testicular pain  Have you seen this provider for your pain in the past?   Yes   Where in your body do you have pain? Low back and hips  Are you seeing anyone else for your pain? Yes - PT      PHQ-9 SCORE 8/28/2018 2/22/2021 11/26/2021   PHQ-9 Total Score - - -   PHQ-9 Total Score 6 2 0       ÁLVARO-7 SCORE 8/28/2018 2/22/2021 11/26/2021   Total Score 3 2 0       Location of pain: low back bilateral and hips  Analgesia/pain control:    - Recent changes:  unchanged    - Overall control: Tolerable with discomfort    - Current treatments: ibuprofen   Adherence:     - Do you ever take more pain medicine than prescribed? No         PROCEDURE: MR LUMBAR SPINE W/O CONTRAST 12/7/2021 2:49 PM     HISTORY: Low back pain with bilateral tingling and numbness for 2  years.     COMPARISONS: None.     Meds/Dose Given: None.     TECHNIQUE: Sagittal T1, T2 and STIR sequences and axial proton density  and T2 weighted images.     FINDINGS: There is some mild reactive endplate change. There is  residual hematopoietic marrow without focal destructive lesion.     At the L5-S1 level there is high signal intensity annular fissure with  small central protrusion. There is no significant nerve root  compression or foraminal narrowing.     At the L4-5 level there is minimal bulging of  the disc. There is mild  facet degenerative change.     At the L3-4 level there is minimal bulging of the disc. There is mild  facet degenerative change.     The L2-3 level there is slight narrowing and dehydration of the disc  with a mild bulge. There is mild facet degenerative change.     At the L1-2 level the disc is dehydrated. Conus medullaris and cauda  equina have a normal appearance.                                                                        IMPRESSION: Mild degenerative disc disease with mild multilevel facet  degenerative change.     Small central protrusion at L5-S1 with mild mass effect on the ventral  thecal sac but no significant nerve root compression.     WASHINGTON GRECO MD            Reason for consultation: Interventional Radiology consultation for low  back pain . 12/8/21     Chief complaint: Low back pain no radicular symptoms.     History of present illness: Chronic history of low back pain. No  radicular symptoms. Worse on the right. Chiropractic care and TENS  units were initially helpful however provided little relief over time.  No previous surgery. No previous therapeutic injection. No physical  therapy.     Location of pain: Low back  Duration of pain: Chronic history of symptoms  Severity of pain: 5/10     Imaging findings:   MRI lumbar spine 12/7/2021 demonstrates mild degenerative changes of  the lumbar spine with small posterior central disc protrusion at  L5-S1. No evidence of nerve root compression or neural compromise.  Annular fissuring along the protrusion at L5-S1.                                                                   Impression:   Lumbar spondylosis. No radicular symptoms.     Recommendation:  Physical examinations with fluoroscopy.     After physical examination we could consider SI joint injections,  trigger point injections or perhaps translaminar epidural steroid  injection at L4-5. There is minimal epidural fat at L5-S1. Attempting  the procedure  at this level carries a high likelihood of thecal sac  puncture.     If the L4-5 injection is unhelpful consider epidural steroid injection  via a caudal approach.     If the steroid steroid injections are not helpful consider medial  branch blocks and workup toward rhizotomy targeting the L3-4, L4-5 and  L5-S1 facet joints.        Thank you for this consultation.     MERYL COVARRUBIAS MD          MR PELVIC BONES WO CONTRAST - 12/30/21     HISTORY: 31 years Male Bilateral hip pain, Pain in right testicle,     Buttock pain; Bilateral hip pain; Bilateral hip pain; Pain in right  testicle; Buttock pain     COMPARISON: None     TECHNIQUE: Multiplanar multisequence MR imaging of the pelvis was  performed.     FINDINGS: There is increased T2 signal within the deep aspect of the  right gluteus minimus muscle adjacent to its attachment on the right  iliac wing. See series 7 images 6 through 11. No soft tissue signal  abnormality about the pelvis or hips is otherwise present. No fluid  collections are seen.     The right inguinal canal is unremarkable. No abnormal fluid  collections are seen. There is no evidence of hernia or mass.     The sacroiliac joints pubic symphysis and hip joints are congruent.  Bone marrow signal of the sacrum and pelvis and proximal femurs is  normal. There is no evidence of edema.     There is no significant hip arthritic change.                                                                      IMPRESSION: Intramuscular edema along the origin of the right gluteus  minimus, adjacent to the right ilium. Question muscular strain,  myotendinous injury. The exam is otherwise unremarkable.     ERROL RAYGOZA MD             Hypothyroidism Follow-up    Since last visit, patient describes the following symptoms: Weight stable, no hair loss, no skin changes, no constipation, no loose stools          Review of Systems   Constitutional, HEENT, cardiovascular, pulmonary, gi and gu systems are negative,  "except as otherwise noted.      Objective    Vitals - Patient Reported  Weight (Patient Reported): 108.9 kg (240 lb)  Height (Patient Reported): 188 cm (6' 2\")  BMI (Based on Pt Reported Ht/Wt): 30.81  Pain Score: Moderate Pain (4)  Pain Loc: Low Back      Vitals:  No vitals were obtained today due to virtual visit.    Physical Exam   healthy, alert and no distress  PSYCH: Alert and oriented times 3; coherent speech, normal   rate and volume, able to articulate logical thoughts, able   to abstract reason, no tangential thoughts, no hallucinations   or delusions  His affect is normal  RESP: No cough, no audible wheezing, able to talk in full sentences  Remainder of exam unable to be completed due to telephone visits        Phone call duration:  12  minutes      "

## 2022-07-18 NOTE — Clinical Note
Neurosurg referral placed - [facility] send MRI's  Needs appt for lab only - TSH ordered  Yeimy Corona [facility] [phone]

## 2022-07-18 NOTE — PATIENT INSTRUCTIONS
Assessment & Plan        Acquired hypothyroidism  - TSH with free T4 reflex; Future    Acute bilateral low back pain with bilateral sciatica  - Neurosurgery Referral    Bilateral hip pain  - Neurosurgery Referral      Yeimy Corona, GILA  [facility] [place] [facility]

## 2022-09-04 ENCOUNTER — HEALTH MAINTENANCE LETTER (OUTPATIENT)
Age: 32
End: 2022-09-04

## 2022-12-28 ENCOUNTER — OFFICE VISIT (OUTPATIENT)
Dept: FAMILY MEDICINE | Facility: OTHER | Age: 32
End: 2022-12-28
Attending: NURSE PRACTITIONER
Payer: COMMERCIAL

## 2022-12-28 VITALS
RESPIRATION RATE: 16 BRPM | DIASTOLIC BLOOD PRESSURE: 74 MMHG | HEART RATE: 84 BPM | SYSTOLIC BLOOD PRESSURE: 132 MMHG | TEMPERATURE: 97.8 F | BODY MASS INDEX: 31.2 KG/M2 | OXYGEN SATURATION: 97 % | WEIGHT: 243 LBS

## 2022-12-28 DIAGNOSIS — R53.83 OTHER FATIGUE: Primary | ICD-10-CM

## 2022-12-28 DIAGNOSIS — M54.41 ACUTE BILATERAL LOW BACK PAIN WITH BILATERAL SCIATICA: ICD-10-CM

## 2022-12-28 DIAGNOSIS — E55.9 VITAMIN D DEFICIENCY: ICD-10-CM

## 2022-12-28 DIAGNOSIS — E03.9 ACQUIRED HYPOTHYROIDISM: ICD-10-CM

## 2022-12-28 DIAGNOSIS — R68.82 LOW LIBIDO: ICD-10-CM

## 2022-12-28 DIAGNOSIS — M54.42 ACUTE BILATERAL LOW BACK PAIN WITH BILATERAL SCIATICA: ICD-10-CM

## 2022-12-28 LAB
BASOPHILS # BLD AUTO: 0 10E3/UL (ref 0–0.2)
BASOPHILS NFR BLD AUTO: 0 %
EOSINOPHIL # BLD AUTO: 0.1 10E3/UL (ref 0–0.7)
EOSINOPHIL NFR BLD AUTO: 1 %
ERYTHROCYTE [DISTWIDTH] IN BLOOD BY AUTOMATED COUNT: 11.8 % (ref 10–15)
HCT VFR BLD AUTO: 46.6 % (ref 40–53)
HGB BLD-MCNC: 16.1 G/DL (ref 13.3–17.7)
LYMPHOCYTES # BLD AUTO: 2.5 10E3/UL (ref 0.8–5.3)
LYMPHOCYTES NFR BLD AUTO: 26 %
MCH RBC QN AUTO: 30.8 PG (ref 26.5–33)
MCHC RBC AUTO-ENTMCNC: 34.5 G/DL (ref 31.5–36.5)
MCV RBC AUTO: 89 FL (ref 78–100)
MONOCYTES # BLD AUTO: 0.7 10E3/UL (ref 0–1.3)
MONOCYTES NFR BLD AUTO: 7 %
NEUTROPHILS # BLD AUTO: 6.5 10E3/UL (ref 1.6–8.3)
NEUTROPHILS NFR BLD AUTO: 67 %
PLATELET # BLD AUTO: 213 10E3/UL (ref 150–450)
RBC # BLD AUTO: 5.22 10E6/UL (ref 4.4–5.9)
WBC # BLD AUTO: 9.7 10E3/UL (ref 4–11)

## 2022-12-28 PROCEDURE — 84443 ASSAY THYROID STIM HORMONE: CPT | Performed by: NURSE PRACTITIONER

## 2022-12-28 PROCEDURE — 36415 COLL VENOUS BLD VENIPUNCTURE: CPT | Performed by: NURSE PRACTITIONER

## 2022-12-28 PROCEDURE — 85025 COMPLETE CBC W/AUTO DIFF WBC: CPT | Performed by: NURSE PRACTITIONER

## 2022-12-28 PROCEDURE — 82306 VITAMIN D 25 HYDROXY: CPT | Performed by: NURSE PRACTITIONER

## 2022-12-28 PROCEDURE — 99214 OFFICE O/P EST MOD 30 MIN: CPT | Performed by: NURSE PRACTITIONER

## 2022-12-28 PROCEDURE — 84439 ASSAY OF FREE THYROXINE: CPT | Performed by: NURSE PRACTITIONER

## 2022-12-28 PROCEDURE — 84270 ASSAY OF SEX HORMONE GLOBUL: CPT | Performed by: NURSE PRACTITIONER

## 2022-12-28 PROCEDURE — 84403 ASSAY OF TOTAL TESTOSTERONE: CPT | Performed by: NURSE PRACTITIONER

## 2022-12-28 NOTE — PROGRESS NOTES
Assessment & Plan          Other fatigue  - CBC with platelets and differential      Low libido  - Testosterone Free and Total      Acquired hypothyroidism  - TSH with free T4 reflex      Acute bilateral low back pain with bilateral sciatica  - Continue plan of care    Vitamin D deficiency  - Vitamin D level      Multiple vitamin daily  Vitamin D3 5000 U daily        Return in about 6 months (around 6/28/2023).        Yeimy Corona CNP  [facility] - [place] Brunner is a 32 year old, presenting for the following health issues:  Sexual Problem and Thyroid Problem          Hypothyroidism Follow-up    Since last visit, patient describes the following symptoms: Weight stable, no hair loss, no skin changes, no constipation, no loose stools and fatigue    Has been off Synthroid intermittently        Chronic/Recurring Back Pain Follow Up    Where is your back pain located? (Select all that apply) low back both    How would you describe your back pain?  shooting    Where does your back pain spread? the right and left buttock and the right and left  thigh    Since your last clinic visit for back pain, how has your pain changed? gradually improving    Does your back pain interfere with your job? At times    Since your last visit, have you tried any new treatment? Yes -  Physical Therapy and steroid injection        Low Libido  Low energy  Would like a testosterone level  D level due as well        Patient Active Problem List   Diagnosis     ACP (advance care planning)     Vitamin D deficiency     Reactive airway disease that is not asthma     Hashimoto's thyroiditis     Acquired hypothyroidism     Bilateral low back pain with bilateral sciatica     Past Surgical History:   Procedure Laterality Date     IR CONSULTATION FOR IR EXAM  12/8/2021     IR FLUORO 0-1 HOUR  12/20/2021     ORTHOPEDIC SURGERY  2015    r shoulder teat bone spur     ORTHOPEDIC SURGERY  2-2016    AC joint right     ORTHOPEDIC  SURGERY  2016    Rt labrial tear     ORTHOPEDIC SURGERY  06/2017    revision decompression subpectoral biceps tenodesis        Social History     Tobacco Use     Smoking status: Never     Smokeless tobacco: Current   Substance Use Topics     Alcohol use: Yes     Comment: occ     Family History   Problem Relation Age of Onset     Diabetes Mother      Hypertension Mother      Hyperlipidemia Mother      Coronary Artery Disease Father      Hyperlipidemia Father      Hypertension Father      Thyroid Disease No family hx of            Current Outpatient Medications   Medication Sig Dispense Refill     ADDERALL XR 30 MG 24 hr capsule TK 1 C PO BID  0     amphetamine-dextroamphetamine (ADDERALL) 15 MG tablet TAKE 1 TABLET BY MOUTH TWICE DAILY AS DIRECTED       levothyroxine (SYNTHROID/LEVOTHROID) 25 MCG tablet TAKE 1 TABLET(25 MCG) BY MOUTH DAILY 90 tablet 1         Allergies   Allergen Reactions     Morphine Sulfate Rash     Cats Other (See Comments)     Other reaction(s): Eye Irritation, Sneezing     Dogs Other (See Comments)     Other reaction(s): Eye Irritation, Sneezing     Morphine Hives         Recent Labs   Lab Test 11/26/21  1103 06/29/21  1537 02/22/21  1433 03/02/18  0902 11/17/17  0000 10/24/17  1439 06/08/17  0922 05/15/17  1630 02/13/17  1020 11/07/16  1403 03/31/16  0858 02/02/16  1519   A1C  --   --   --   --   --  5.1  --   --   --   --   --   --    LDL  --   --   --   --   --   --   --  125* 107* 103*   < >  --    HDL  --   --   --   --   --   --   --  62 46 47   < >  --    TRIG  --   --   --   --   --   --   --  136 147 167*   < >  --    ALT  --   --  69  --   --   --  82*  --   --   --   --  78*   CR  --   --  1.06  --  0.98 0.95 0.99 0.87 0.86 1.01   < > 1.19   GFRESTIMATED  --   --  >90  --   --  >90 >90  Non  GFR Calc   >90  Non  GFR Calc   >90  Non  GFR Calc   89   < > 74   GFRESTBLACK  --   --  >90  --   --  >90 >90  African American GFR Calc    >90   GFR Calc   >90   GFR Calc   >90   GFR Calc     < > 89   POTASSIUM  --   --  3.9  --  3.7 3.9 4.3 3.7 4.6 3.9   < > 3.8   TSH 4.64* 6.08* 7.80*   < >  --  1.94  --  3.08 1.92 2.86   < > 4.41*    < > = values in this interval not displayed.            BP Readings from Last 3 Encounters:   12/28/22 132/74   07/18/22 130/70   11/26/21 (!) 144/78    Wt Readings from Last 3 Encounters:   12/28/22 110.2 kg (243 lb)   11/26/21 107 kg (236 lb)   06/29/21 112.9 kg (249 lb)                Review of Systems   Constitutional, HEENT, cardiovascular, pulmonary, gi and gu systems are negative, except as otherwise noted.            Objective    /74 (BP Location: Right arm, Patient Position: Sitting, Cuff Size: Adult Large)   Pulse 84   Temp 97.8  F (36.6  C) (Tympanic)   Resp 16   Wt 110.2 kg (243 lb)   SpO2 97%   BMI 31.20 kg/m    Body mass index is 31.2 kg/m .         Physical Exam   GENERAL: healthy, alert and no distress  EYES: Eyes grossly normal to inspection, PERRL and conjunctivae and sclerae normal  NECK: no adenopathy, no asymmetry, masses, or scars and thyroid normal to palpation  RESP: lungs clear to auscultation - no rales, rhonchi or wheezes  CV: regular rate and rhythm, normal S1 S2, no S3 or S4, no murmur, click or rub, no peripheral edema and peripheral pulses strong  SKIN: no suspicious lesions or rashes  PSYCH: mentation appears normal, affect normal/bright

## 2022-12-28 NOTE — PATIENT INSTRUCTIONS
Assessment & Plan          Other fatigue  - CBC with platelets and differential      Low libido  - Testosterone Free and Total      Acquired hypothyroidism  - TSH with free T4 reflex      Acute bilateral low back pain with bilateral sciatica  - Continue plan of care      Vitamin D deficiency  - Vitamin D level        Multiple vitamin daily  Vitamin D3 5000 U daily        Return in about 6 months (around 6/28/2023).        Yeimy Corona CNP  [facility]

## 2022-12-29 LAB
T4 FREE SERPL-MCNC: 1.23 NG/DL (ref 0.9–1.7)
TSH SERPL DL<=0.005 MIU/L-ACNC: 7.75 UIU/ML (ref 0.3–4.2)

## 2022-12-30 LAB
DEPRECATED CALCIDIOL+CALCIFEROL SERPL-MC: 45 UG/L (ref 20–75)
SHBG SERPL-SCNC: 16 NMOL/L (ref 11–80)

## 2022-12-30 NOTE — RESULT ENCOUNTER NOTE
TSH a bit elevated, but he has not been taking his synthroid consistently.    We should recheck his level in a few months after he takes it with regularity.    Testosterone level pending - will likely have it back by Tues  So far D level and other labs besides TSH are normal      Yeimy CORNEJO  [phone]

## 2023-01-01 LAB
TESTOST FREE SERPL-MCNC: 8.47 NG/DL
TESTOST SERPL-MCNC: 303 NG/DL (ref 240–950)

## 2023-01-24 ENCOUNTER — TELEPHONE (OUTPATIENT)
Dept: FAMILY MEDICINE | Facility: OTHER | Age: 33
End: 2023-01-24

## 2023-01-24 DIAGNOSIS — N50.811 PAIN IN RIGHT TESTICLE: Primary | ICD-10-CM

## 2023-01-24 NOTE — TELEPHONE ENCOUNTER
Reason for call:  REFERRAL   1. Concern: Having pain testicular pain  2. Have you seen a provider for this concern? Yes patient said he saw Yeimy Corona for this for a few times   3. Who? Yeimy Corona   4. When? 12/28/22  5. What services are you requesting? Urologist he prefers a male doctor  Description: Krzysztof is having testicular pain, lower back and pelvic pain  Was an appointment offered for this a call? No  If Yes:  Appointment type                Date    Preferred method for responding to this message: Telephone Call  What is your phone number ?  [phone]    If we cannot reach you directly, may we leave a detailed response at the number you provided? Yes  Can this message wait until your PCP/Provider returns if not available today?  No

## 2023-02-09 ENCOUNTER — OFFICE VISIT (OUTPATIENT)
Dept: UROLOGY | Facility: OTHER | Age: 33
End: 2023-02-09
Attending: NURSE PRACTITIONER
Payer: COMMERCIAL

## 2023-02-09 VITALS
SYSTOLIC BLOOD PRESSURE: 138 MMHG | DIASTOLIC BLOOD PRESSURE: 78 MMHG | BODY MASS INDEX: 31.23 KG/M2 | HEART RATE: 87 BPM | RESPIRATION RATE: 16 BRPM | OXYGEN SATURATION: 98 % | WEIGHT: 243.2 LBS

## 2023-02-09 DIAGNOSIS — R10.2 PELVIC PAIN IN MALE: Primary | ICD-10-CM

## 2023-02-09 DIAGNOSIS — N50.811 PAIN IN RIGHT TESTICLE: ICD-10-CM

## 2023-02-09 PROCEDURE — 99214 OFFICE O/P EST MOD 30 MIN: CPT | Performed by: UROLOGY

## 2023-02-09 ASSESSMENT — PAIN SCALES - GENERAL: PAINLEVEL: MODERATE PAIN (4)

## 2023-02-09 NOTE — PROGRESS NOTES
Type of Visit  EST    Chief Complaint  Testicular pain  Pelvic pain  Constipation    HPI  Mr. Ordonez is a 32 year old male who follows up with intermittent and chronic right testicular pain.  He was iniitally seen 2.5 years ago with the same complaint.  The patient has been seen by numerous physicians over the past 3 years.  He has undergone extensive testing including complicated imaging studies such as MRIs.  Nothing has returned abnormal.  His pain persists.    His pain involves much more than just his testicle.  He has shooting rectal pain as well as lower back pain.  He also complains of constipation.    He has previously attempted SI joint injections, he has seen a chiropractor and he has also completed some physical therapy.  The SI joint injections did not improve his symptoms.  The chiropractor and physical therapy did improve somewhat.      Review of Systems  I personally reviewed the ROS with the patient.    Nursing Notes:   Winifred Ahn LPN  2/9/2023  2:26 PM  Addendum  Chief Complaint   Patient presents with     Follow Up     Testicular pain        Patient presents to the clinic today for a follow up for testicular pain     Review of Systems:    Weight loss:    No     Recent fever/chills:  No   Night sweats:   No  Current skin rash:  No   Recent hair loss:  No  Heat intolerance:  No   Cold intolerance:  No  Chest pain:   No   Palpitations:   No  Shortness of breath:  No   Wheezing:   No  Constipation:    Yes   Diarrhea:   No   Nausea:   Yes   Vomiting:   No   Kidney/side pain:  Yes   Back pain:   Yes  Frequent headaches:  No   Dizziness:     No  Leg swelling:   No   Calf pain:    Yes          Medication Reconciliation: completed   Winifred Ahn LPN  2/9/2023 2:22 PM       Physical Exam  Vitals:    02/09/23 1424   BP: 138/78   BP Location: Right arm   Patient Position: Sitting   Cuff Size: Adult Large   Pulse: 87   Resp: 16   SpO2: 98%   Weight: 110.3 kg (243 lb 3.2 oz)     Constitutional: No acute  distress.  Alert and cooperative   Cardiovascular: Regular rate.  Pulmonary/Chest: Respirations are even and non-labored bilaterally, no audible wheezing  Abdominal: Soft. No distension, tenderness, masses or guarding.   Extremities: SHARON x 4, Warm. No clubbing.  No cyanosis.    Skin: Pink, warm and dry.  No visible rashes noted.  Back:  No left CVA tenderness.  No right CVA tenderness.  Genitourinary:  Nonpalpable bladder  Completely normal genitourinary exam    Labs   12/28/22 15:53   Free Testosterone Calculated 8.47   T4 Free 1.23   Testosterone Total 303   TSH 7.75 (H)   Vitamin D Deficiency screening 45   WBC 9.7   Hemoglobin 16.1   Hematocrit 46.6   Platelet Count 213   RBC Count 5.22   MCV 89   MCH 30.8   MCHC 34.5   RDW 11.8   % Neutrophils 67   % Lymphocytes 26   % Monocytes 7   % Eosinophils 1   % Basophils 0   Absolute Basophils 0.0   Absolute Eosinophils 0.1   Absolute Lymphocytes 2.5   Absolute Monocytes 0.7   Absolute Neutrophils 6.5   Sex Hormone Binding Globulin 16     Imaging  MR Pelvic bones  12/30/2021  IMPRESSION: Intramuscular edema along the origin of the right gluteus  minimus, adjacent to the right ilium. Question muscular strain,  myotendinous injury. The exam is otherwise unremarkable.    Assessment  Mr. Ordonez is a 32 year old male who follows up with right testicular pain.  He also complained of constipation.    Reviewed last notes labs and imaging to conduct the visit.    I explained to the patient that I do not have an explanation for his symptoms.  I do not think urology is his best specialty given his pelvic floor symptoms involve much more than just a urinary tract.  The testicular pain is likely referred.    If he had isolated testicular pain we did discuss cord denervation although this is not a good option for him given he has more symptoms.    Because he mentioned he had constipation I started talking about treatment options for constipation.  Once I started discussing  treatment options for constipation he states he has diarrhea.    Plan  Pelvic for physical therapy  Follow-up with urology as needed                A total of 30 minutes was spent with the patient, reviewing records, tests, ordering medications, tests or procedures, counseling regarding the above described medical concern, recommendations regarding management and documenting clinical information in the EHR.

## 2023-02-09 NOTE — NURSING NOTE
Chief Complaint   Patient presents with     Follow Up     Testicular pain        Patient presents to the clinic today for a follow up for testicular pain     Review of Systems:    Weight loss:    No     Recent fever/chills:  No   Night sweats:   No  Current skin rash:  No   Recent hair loss:  No  Heat intolerance:  No   Cold intolerance:  No  Chest pain:   No   Palpitations:   No  Shortness of breath:  No   Wheezing:   No  Constipation:    Yes   Diarrhea:   No   Nausea:   Yes   Vomiting:   No   Kidney/side pain:  Yes   Back pain:   Yes  Frequent headaches:  No   Dizziness:     No  Leg swelling:   No   Calf pain:    Yes          Medication Reconciliation: completed   Winifred Ahn LPN  2/9/2023 2:22 PM

## 2023-02-13 ENCOUNTER — APPOINTMENT (OUTPATIENT)
Dept: CT IMAGING | Facility: HOSPITAL | Age: 33
End: 2023-02-13
Attending: EMERGENCY MEDICINE
Payer: OTHER MISCELLANEOUS

## 2023-02-13 ENCOUNTER — HOSPITAL ENCOUNTER (EMERGENCY)
Facility: HOSPITAL | Age: 33
Discharge: HOME | End: 2023-02-13
Attending: EMERGENCY MEDICINE | Admitting: EMERGENCY MEDICINE
Payer: OTHER MISCELLANEOUS

## 2023-02-13 VITALS
DIASTOLIC BLOOD PRESSURE: 75 MMHG | RESPIRATION RATE: 16 BRPM | SYSTOLIC BLOOD PRESSURE: 150 MMHG | HEART RATE: 73 BPM | OXYGEN SATURATION: 93 % | TEMPERATURE: 98 F

## 2023-02-13 DIAGNOSIS — S16.1XXA STRAIN OF NECK MUSCLE, INITIAL ENCOUNTER: ICD-10-CM

## 2023-02-13 DIAGNOSIS — S00.83XA CONTUSION OF FOREHEAD, INITIAL ENCOUNTER: ICD-10-CM

## 2023-02-13 PROCEDURE — 72125 CT NECK SPINE W/O DYE: CPT

## 2023-02-13 PROCEDURE — 99284 EMERGENCY DEPT VISIT MOD MDM: CPT | Performed by: EMERGENCY MEDICINE

## 2023-02-13 PROCEDURE — 70450 CT HEAD/BRAIN W/O DYE: CPT

## 2023-02-13 PROCEDURE — 250N000013 HC RX MED GY IP 250 OP 250 PS 637: Performed by: EMERGENCY MEDICINE

## 2023-02-13 PROCEDURE — 99284 EMERGENCY DEPT VISIT MOD MDM: CPT | Mod: 25

## 2023-02-13 RX ORDER — ACETAMINOPHEN 325 MG/1
975 TABLET ORAL ONCE
Status: COMPLETED | OUTPATIENT
Start: 2023-02-13 | End: 2023-02-13

## 2023-02-13 RX ADMIN — ACETAMINOPHEN 975 MG: 325 TABLET ORAL at 19:29

## 2023-02-13 ASSESSMENT — ENCOUNTER SYMPTOMS
SHORTNESS OF BREATH: 0
CHILLS: 0
FEVER: 0

## 2023-02-13 NOTE — LETTER
February 13, 2023      To Whom It May Concern:      Kannan Ordonez was seen in our Emergency Department today, 02/13/23.  I expect his condition to improve over the next 1 day.  He may return to work/school when improved on 2/15/23.    Sincerely,        Laura Cash RN

## 2023-02-14 ENCOUNTER — HOSPITAL ENCOUNTER (OUTPATIENT)
Dept: PHYSICAL THERAPY | Facility: HOSPITAL | Age: 33
Setting detail: THERAPIES SERIES
Discharge: HOME | End: 2023-02-14
Attending: UROLOGY
Payer: COMMERCIAL

## 2023-02-14 DIAGNOSIS — R10.2 PELVIC PAIN IN MALE: ICD-10-CM

## 2023-02-14 DIAGNOSIS — N50.811 PAIN IN RIGHT TESTICLE: ICD-10-CM

## 2023-02-14 PROCEDURE — 97110 THERAPEUTIC EXERCISES: CPT | Mod: GP

## 2023-02-14 PROCEDURE — 97161 PT EVAL LOW COMPLEX 20 MIN: CPT | Mod: GP

## 2023-02-14 NOTE — ED PROVIDER NOTES
History     Chief Complaint   Patient presents with     Trauma     HPI  Kannan Ordonez is a 32 year old male who is here with headache and left paravertebral cervical pain.  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  Was checked out on Cipro had persistent head pain so he came here for evaluation.  No numbness or tingling.  No nausea no vomiting.  No memory issues.  No difficulties with ambulation.  To 800 mg ibuprofen prior to arrival.    Allergies:  Allergies   Allergen Reactions     Morphine Sulfate Rash     Cats Other (See Comments)     Other reaction(s): Eye Irritation, Sneezing     Dogs Other (See Comments)     Other reaction(s): Eye Irritation, Sneezing     Morphine Hives       Problem List:    Patient Active Problem List    Diagnosis Date Noted     Bilateral low back pain with bilateral sciatica 07/18/2022     Priority: Medium     Acquired hypothyroidism 11/26/2021     Priority: Medium     Hashimoto's thyroiditis 08/28/2018     Priority: Medium     Reactive airway disease that is not asthma 02/12/2018     Priority: Medium     Replacing diagnoses that were inactivated after the 10/1/2021 regulatory import.       Vitamin D deficiency 10/24/2017     Priority: Medium     ACP (advance care planning) 05/26/2016     Priority: Medium     Advance Care Planning 5/26/2016: ACP Review of Chart / Resources Provided:  Reviewed chart for advance care plan.  Kannan Ordonez has been provided information and resources to begin or update their advance care plan.  Added by ELVA IBARRA                Past Medical History:    Past Medical History:   Diagnosis Date     Acquired hypothyroidism 11/26/2021     Anxiety 6/24/2016     Asthma      Hashimoto's thyroiditis 8/28/2018     Heart murmur 11/20/2017     Hyperlipidemia LDL goal <100 12/16/2015     Hypertension 3/3/2015     Hypothyroidism 5/1/2015     Reactive airway disease  that is not asthma 2/12/2018     Recurrent major depressive disorder (H) 6/24/2016     Vitamin D deficiency 10/24/2017       Past Surgical History:    Past Surgical History:   Procedure Laterality Date     IR CONSULTATION FOR IR EXAM  12/8/2021     IR FLUORO 0-1 HOUR  12/20/2021     ORTHOPEDIC SURGERY  2015    r shoulder teat bone spur     ORTHOPEDIC SURGERY  2-2016    AC joint right     ORTHOPEDIC SURGERY  2016    Rt labrial tear     ORTHOPEDIC SURGERY  06/2017    revision decompression subpectoral biceps tenodesis        Family History:    Family History   Problem Relation Age of Onset     Diabetes Mother      Hypertension Mother      Hyperlipidemia Mother      Coronary Artery Disease Father      Hyperlipidemia Father      Hypertension Father      Thyroid Disease No family hx of        Social History:  Marital Status:  Single [1]  Social History     Tobacco Use     Smoking status: Never     Smokeless tobacco: Current   Substance Use Topics     Alcohol use: Yes     Comment: occ     Drug use: No        Medications:    ADDERALL XR 30 MG 24 hr capsule  amphetamine-dextroamphetamine (ADDERALL) 15 MG tablet  levothyroxine (SYNTHROID/LEVOTHROID) 25 MCG tablet          Review of Systems   Constitutional: Negative for chills and fever.   Respiratory: Negative for shortness of breath.    Cardiovascular: Negative for chest pain.   All other systems reviewed and are negative.      Physical Exam   BP: 163/97  Pulse: 96  Temp: 98  F (36.7  C)  Resp: 16  SpO2: 100 %      Physical Exam  Constitutional:       General: He is not in acute distress.     Appearance: Normal appearance.   HENT:      Head: Atraumatic.      Comments: 2 abrasions to forehead     Right Ear: External ear normal.      Left Ear: External ear normal.      Nose: Nose normal. No rhinorrhea.   Eyes:      Conjunctiva/sclera: Conjunctivae normal.   Neck:      Comments: No midline cervical tenderness  Cardiovascular:      Rate and Rhythm: Normal rate and regular  rhythm.      Pulses: Normal pulses.   Pulmonary:      Effort: Pulmonary effort is normal. No respiratory distress.      Breath sounds: Normal breath sounds.   Abdominal:      General: There is no distension.      Palpations: Abdomen is soft.      Tenderness: There is no abdominal tenderness.   Musculoskeletal:         General: No deformity or signs of injury.   Skin:     General: Skin is warm and dry.      Capillary Refill: Capillary refill takes less than 2 seconds.   Neurological:      General: No focal deficit present.      Mental Status: He is alert. Mental status is at baseline.      Comments: CN II through XII intact, finger-nose quick and coordinated, 5-5 strength upper and lower extremities, heel-to-shin normal   Psychiatric:         Mood and Affect: Mood normal.         Behavior: Behavior normal.         ED Course                 Procedures             Critical Care time:               Results for orders placed or performed during the hospital encounter of 02/13/23 (from the past 24 hour(s))   CT Head w/o Contrast    Narrative    PROCEDURE: CT HEAD W/O CONTRAST   2/13/2023 7:09 PM    HISTORY:Male, age,  32 years, , , head injury    COMPARISON:No relevant prior imaging.    TECHNIQUE: CT of the brain without contrast. Axial; sagittal and  coronal MIP images were reviewed.    FINDINGS: Ventricles and sulci are normal in size and shape. Gray and  white matter demonstrate normal differentiation.    There is no evidence of mass, mass effect or midline shift. No  evidence of acute hemorrhage. Soft tissue swelling in the right  supraorbital region.    The bones are unremarkable. No fracture.       Impression    IMPRESSION:   No acute intracranial abnormality.  No acute fracture.     Soft tissue contusion in the right supraorbital region.    This facility minimizes radiation dose by adjusting the mA and/or kV  according to each patient size.      This CT scan was performed using one or more the following  dose  reduction techniques:    -Automated exposure control,  -Adjustment of the mA and/or kV according to patient's size, and/or,  -Use of iterative reconstruction technique.      MERYL COVARRUBIAS MD         SYSTEM ID:  [id number]   CT Cervical Spine w/o Contrast    Narrative    CT CERVICAL SPINE W/O CONTRAST, 2/13/2023 7:09 PM    History: Male, age 32 years; Neck pain; Trauma; Significant trauma    Comparison: No relevant prior imaging.    TECHNIQUE: CT was performed of the cervical spine. Axial; sagittal and  coronal MIP images were reviewed..    FINDINGS: The  cervical spine demonstrates straightening secondary to  cervical collar. There is no evidence of acute or subacute fracture.  Vertebral bodies and posterior horns are well aligned. Scattered  degenerative changes are seen throughout the cervical spine, most  evident at the C6-7 level. Soft tissues of the neck demonstrate no  acute abnormality.      Impression    IMPRESSION:   No evidence of acute or subacute bony abnormality.    This facility minimizes radiation dose by adjusting the mA and/or kV  according to each patient size.      This CT scan was performed using one or more the following dose  reduction techniques:    -Automated exposure control,  -Adjustment of the mA and/or kV according to patient's size, and/or,  -Use of iterative reconstruction technique.    MERYL COVARRUBIAS MD         SYSTEM ID:  [id number]       Medications   acetaminophen (TYLENOL) tablet 975 mg (975 mg Oral Given 2/13/23 1929)       Assessments & Plan (with Medical Decision Making)     I have reviewed the nursing notes.    I have reviewed the findings, diagnosis, plan and need for follow up with the patient.      Medical Decision Making  The patient's presentation is strongly suggestive of an acute and uncomplicated illness or injury.    The patient's evaluation involved:  ordering and/or review of 2 test(s) in this encounter (ct head, ct c-spine)    The patient's management  involved prescription drug management (including medications given in the ED).    32-year-old male here with contusion to forehead, left paravertebral tenderness, no step-off, normal reads on CT head and CT cervical spine.  No indications for keeping patient in collar and waiting MRI given lack of neurodeficits and no midline tenderness.  Tetanus updated 9 years ago.  Pain treated in emergency department.  No red flags to warrant admission for observation.  Stable for discharge home at this time.    Discharge Medication List as of 2/13/2023  7:26 PM          Final diagnoses:   Contusion of forehead, initial encounter   Strain of neck muscle, initial encounter       2/13/2023   [facility]     Que Pina MD  02/13/23 2011

## 2023-02-14 NOTE — ED TRIAGE NOTES
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 c collar applied. No numbness and tingling ambulatory. MD notified   Triage Assessment     Row Name 02/13/23 5128       Triage Assessment (Adult)    Airway WDL WDL       Respiratory WDL    Respiratory WDL WDL       Skin Circulation/Temperature WDL    Skin Circulation/Temperature WDL WDL       Cardiac WDL    Cardiac WDL WDL       Peripheral/Neurovascular WDL    Peripheral Neurovascular WDL WDL       Cognitive/Neuro/Behavioral WDL    Cognitive/Neuro/Behavioral WDL WDL

## 2023-02-14 NOTE — ED NOTES
No vision changes, some nausea, denies numbness/ tingling. Took ibuprofen for headache and neck pain earlier today and pain is minimal now. States this occurred at approximately 1pm today.

## 2023-02-16 ENCOUNTER — TELEPHONE (OUTPATIENT)
Dept: FAMILY MEDICINE | Facility: OTHER | Age: 33
End: 2023-02-16

## 2023-02-16 NOTE — TELEPHONE ENCOUNTER
Emergency Department and Urgent Care Follow-up    Pt calling for ED Follow-up from 2.13.2023. 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     Denies new or worsening s/s.His HA and neck is not going away.Denies this is worsening or new.    This is workman's comp.    He prefers to see PCP.    OVERBOOK tomorrow?    Please advise.    Call back [phone]      Zoey Cm RN        Reason for ER/UC visit: ED Follow-up hit head on excavator window at work-Workmans comp  o Date seen: 2.13.2023      New or Worsening symptoms:  No but HA remains and neck pain remains but nothing new or worsened      Prescription Received/Picked up from Pharmacy?:    o Medications started? No  o Any questions or issues regarding your prescription?:       Follow-up Results or Labs that are pending:       Questions or concerns?:       ER Recommends Follow-up by:       RN Recommendations:   o Appointment scheduled:     If you start feeling worse, or have any further questions, please feel free to contact Nurse Triage at [phone].  If needing immediate medical attention at any time please call 911/Go to the ER.

## 2023-02-17 ENCOUNTER — OFFICE VISIT (OUTPATIENT)
Dept: FAMILY MEDICINE | Facility: OTHER | Age: 33
End: 2023-02-17
Attending: NURSE PRACTITIONER
Payer: OTHER MISCELLANEOUS

## 2023-02-17 VITALS
WEIGHT: 242.3 LBS | BODY MASS INDEX: 31.11 KG/M2 | DIASTOLIC BLOOD PRESSURE: 62 MMHG | HEART RATE: 76 BPM | RESPIRATION RATE: 18 BRPM | TEMPERATURE: 97.2 F | OXYGEN SATURATION: 98 % | SYSTOLIC BLOOD PRESSURE: 130 MMHG

## 2023-02-17 DIAGNOSIS — M54.2 CERVICALGIA: ICD-10-CM

## 2023-02-17 DIAGNOSIS — G44.309 POST-TRAUMATIC HEADACHE, NOT INTRACTABLE, UNSPECIFIED CHRONICITY PATTERN: ICD-10-CM

## 2023-02-17 DIAGNOSIS — S00.83XD CONTUSION OF FOREHEAD, SUBSEQUENT ENCOUNTER: Primary | ICD-10-CM

## 2023-02-17 PROCEDURE — 99213 OFFICE O/P EST LOW 20 MIN: CPT | Performed by: NURSE PRACTITIONER

## 2023-02-17 RX ORDER — LISDEXAMFETAMINE DIMESYLATE 70 MG/1
CAPSULE ORAL
COMMUNITY
Start: 2023-02-02 | End: 2023-03-03

## 2023-02-17 ASSESSMENT — PAIN SCALES - GENERAL: PAINLEVEL: MODERATE PAIN (4)

## 2023-02-17 NOTE — PROGRESS NOTES
Occupational Visit           SUBJECTIVE:  Kannan Ordonez, 32 year old, male is seen for follow up of occupational injury. Date of injury is 02/13/2023.        Linked Episodes   Type: Episode: Status: Noted: Resolved: Last update: Updated by:   WORK COMP [employer] Active 2/13/2023 2/17/2023 10:25 AM Sydnie Lo LPN      Comments:         Musculoskeletal problem/pain  Headache    Duration: 02/13/202    Description  Location: neck    Intensity:  4/10    Accompanying signs and symptoms: radiation of pain to left shoulder    History  Previous similar problem: no   Previous evaluation:  CT    Precipitating or alleviating factors:  Trauma or overuse: YES  Aggravating factors include: none    Therapies tried and outcome: Ibuprofen          Contusion of forehead    Duration: 02/13/2023    Description (location/character/radiation): hit head against equipment at work     Intensity:  4/10    Accompanying signs and symptoms: head ache since 02/13/2023    History (similar episodes/previous evaluation): None    Precipitating or alleviating factors: None    Therapies tried and outcome: ibuprofen           Allergies   Allergen Reactions     Morphine Sulfate Rash     Cats Other (See Comments)     Other reaction(s): Eye Irritation, Sneezing     Dogs Other (See Comments)     Other reaction(s): Eye Irritation, Sneezing     Morphine Hives         Review of Systems:  Constitutional, HEENT, cardiovascular, pulmonary, gi and gu systems are negative, except as otherwise noted.        OBJECTIVE:  Vitals:    02/17/23 1018   BP: 130/62   Pulse: 76   Resp: 18   Temp: 97.2  F (36.2  C)   SpO2: 98%           HPI  Kannan Ordonez is a 32 year old male who is here with headache and left paravertebral cervical pain.  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  Was checked out on Cipro had persistent head pain so he came here  for evaluation.  No numbness or tingling.  No nausea no vomiting.  No memory issues.  No difficulties with ambulation.  To 800 mg ibuprofen prior to arrival.        CT Head w/o Contrast     Narrative     PROCEDURE: CT HEAD W/O CONTRAST   2/13/2023 7:09 PM     HISTORY:Male, age,  32 years, , , head injury     COMPARISON:No relevant prior imaging.     TECHNIQUE: CT of the brain without contrast. Axial; sagittal and  coronal MIP images were reviewed.     FINDINGS: Ventricles and sulci are normal in size and shape. Gray and  white matter demonstrate normal differentiation.     There is no evidence of mass, mass effect or midline shift. No  evidence of acute hemorrhage. Soft tissue swelling in the right  supraorbital region.     The bones are unremarkable. No fracture.         Impression     IMPRESSION:   No acute intracranial abnormality.  No acute fracture.      Soft tissue contusion in the right supraorbital region.     This facility minimizes radiation dose by adjusting the mA and/or kV  according to each patient size.        This CT scan was performed using one or more the following dose  reduction techniques:     -Automated exposure control,  -Adjustment of the mA and/or kV according to patient's size, and/or,  -Use of iterative reconstruction technique.             CT Cervical Spine w/o Contrast     Narrative     CT CERVICAL SPINE W/O CONTRAST, 2/13/2023 7:09 PM     History: Male, age 32 years; Neck pain; Trauma; Significant trauma     Comparison: No relevant prior imaging.     TECHNIQUE: CT was performed of the cervical spine. Axial; sagittal and  coronal MIP images were reviewed..     FINDINGS: The  cervical spine demonstrates straightening secondary to  cervical collar. There is no evidence of acute or subacute fracture.  Vertebral bodies and posterior horns are well aligned. Scattered  degenerative changes are seen throughout the cervical spine, most  evident at the C6-7 level. Soft tissues of the neck  demonstrate no  acute abnormality.        Impression     IMPRESSION:   No evidence of acute or subacute bony abnormality.     This facility minimizes radiation dose by adjusting the mA and/or kV  according to each patient size.        This CT scan was performed using one or more the following dose  reduction techniques:     -Automated exposure control,  -Adjustment of the mA and/or kV according to patient's size, and/or,  -Use of iterative reconstruction technique.              Medical Decision Making  The patient's presentation is strongly suggestive of an acute and uncomplicated illness or injury.     The patient's evaluation involved:  ordering and/or review of 2 test(s) in this encounter (ct head, ct c-spine)     The patient's management involved prescription drug management (including medications given in the ED).     32-year-old male here with contusion to forehead, left paravertebral tenderness, no step-off, normal reads on CT head and CT cervical spine.  No indications for keeping patient in collar and waiting MRI given lack of neurodeficits and no midline tenderness.  Tetanus updated 9 years ago.  Pain treated in emergency department.  No red flags to warrant admission for observation.  Stable for discharge home at this time.     Discharge Medication List as of 2/13/2023  7:26 PM                 Final diagnoses:   Contusion of forehead, initial encounter   Strain of neck muscle, initial encounter           2/13/2023   [facility]     Que Pina MD          Exam:  GENERAL: healthy, alert, well nourished, well hydrated, no distress  MS - Pain at base of skull, upper cervical.  Upper trapezius tightness.  Sink - forehead contusion resolved        1. Contusion of forehead, subsequent encounter  - Resolving      2. Cervicalgia  - Ice  - Ibuprofen  - Follow-up as needed      3. Post-traumatic headache, not intractable, unspecified chronicity pattern  - Ibuprofen  - Follow-up as needed        Yeimy  [facility]  [phone]

## 2023-02-17 NOTE — PATIENT INSTRUCTIONS
1. Contusion of forehead, subsequent encounter  - Resolving      2. Cervicalgia  - Ice  - Ibuprofen  - Follow-up as needed      3. Post-traumatic headache, not intractable, unspecified chronicity pattern  - Ibuprofen  - Follow-up as needed        Yeimy CORNEJO  [phone]

## 2023-02-17 NOTE — LETTER
REPORT OF WORK COMP    [facility]  [address]  [address]  [place] [address]  [phone]      PATIENT DATA    Employee Name: Kannan Ordonez      : 1990     #: [id number]    Work related injury: Yes  Employer at time of injury: [employer]  Employer contact & phone: On file  Employed elsewhere? No  Workers' Compensation Carrier/Managed Care Plan:  On File    Today's date: 2023  Date of injury: 23  Date of first visit: 23    PROVIDER EVALUATION: Please fill in as needed.  Please give copy to employee for employer.    1. Diagnosis:   Contusion of forehead  Strain of neck muscle      2. Treatment: Ibuprofen, Ice  3. Medication: Over the counter  NOTE: When ordering a medication, [place] Rules require Work Comp or WC on prescriptions.      4. No work from :  N/A  5. Return to work date: Was not taken out of work due to his injury  ** WITH RESTRICTIONS? No restrictions      RESTRICTIONS: Unlimited unless listed.  Restrictions apply to home and leisure also.  If work restrictions is not available, the employee is totally disabled.    Maximum Medical Improvement (Date): 23  Any Permanent Partial Disability? 0%    Provider comments: Stable    Medical Examiner: Yeimy Corona          License or registration: [email or web address]    Next appointment: As needed    CC: Employer, Managed Care Plan/Payor, Patient

## 2023-02-22 ENCOUNTER — OFFICE VISIT (OUTPATIENT)
Dept: FAMILY MEDICINE | Facility: OTHER | Age: 33
End: 2023-02-22
Attending: NURSE PRACTITIONER
Payer: OTHER MISCELLANEOUS

## 2023-02-22 VITALS
RESPIRATION RATE: 18 BRPM | TEMPERATURE: 97.8 F | HEART RATE: 74 BPM | WEIGHT: 242 LBS | BODY MASS INDEX: 31.07 KG/M2 | DIASTOLIC BLOOD PRESSURE: 68 MMHG | SYSTOLIC BLOOD PRESSURE: 130 MMHG | OXYGEN SATURATION: 98 %

## 2023-02-22 DIAGNOSIS — M54.2 CERVICALGIA: Primary | ICD-10-CM

## 2023-02-22 PROCEDURE — 99214 OFFICE O/P EST MOD 30 MIN: CPT | Performed by: NURSE PRACTITIONER

## 2023-02-22 ASSESSMENT — PAIN SCALES - GENERAL: PAINLEVEL: MODERATE PAIN (4)

## 2023-02-22 NOTE — LETTER
REPORT OF WORK COMP    [facility]  [address]  [address]  [place] [address]  [phone]      PATIENT DATA    Employee Name: Kannan Ordonez      : 1990     #: [id number]    Work related injury: Yes  Employer at time of injury: [employer]  Employer contact & phone: On File  Employed elsewhere? No  Workers' Compensation Carrier/Managed Care Plan:  On File    Today's date: 2023  Date of injury: 23  Date of first visit: 23    PROVIDER EVALUATION: Please fill in as needed.  Please give copy to employee for employer.    1. Diagnosis:  Cervicalgia    2. Treatment:  Ibuprofen, muscle relaxer, physical therapy ordered today  3. Medication: see above  NOTE: When ordering a medication, [place] Rules require Work Comp or WC on prescriptions.    4. No work from :  N/A  5. Return to work date: N/A  ** WITH RESTRICTIONS? No restrictions      RESTRICTIONS: Unlimited unless listed.  Restrictions apply to home and leisure also.  If work restrictions is not available, the employee is totally disabled.    Maximum Medical Improvement (Date): N/A  Any Permanent Partial Disability? Deferred to future exam/consult.        Medical Examiner: Yeimy Corona   License or registration: [email or web address]    Next appointment: As needed    CC: Employer, Managed Care Plan/Payor, Patient

## 2023-02-22 NOTE — PROGRESS NOTES
Occupational Visit     SUBJECTIVE:  Kannan Ordonez, 32 year old, male is seen for follow up of occupational injury. Date of injury is 02/13/2023.        Linked Episodes   Type: Episode: Status: Noted: Resolved: Last update: Updated by:   WORK COMP [employer] Active 2/13/2023 2/22/2023 10:09 AM Sydnie Lo LPN      Comments:         Neck Pain    Duration: 02/13/2023    Description:  Location: neck  Radiation: nto the left shoulder    Intensity:  4/10    Accompanying signs and symptoms: none    History (similar episodes/previous evaluation): None    Precipitating or alleviating factors: None    Therapies tried and outcome: None        PROCEDURE: CT HEAD W/O CONTRAST   2/13/2023 7:09 PM     HISTORY:Male, age,  32 years, , , head injury     COMPARISON:No relevant prior imaging.     TECHNIQUE: CT of the brain without contrast. Axial; sagittal and  coronal MIP images were reviewed.     FINDINGS: Ventricles and sulci are normal in size and shape. Gray and  white matter demonstrate normal differentiation.     There is no evidence of mass, mass effect or midline shift. No  evidence of acute hemorrhage. Soft tissue swelling in the right  supraorbital region.     The bones are unremarkable. No fracture.                                                                       IMPRESSION:   No acute intracranial abnormality.  No acute fracture.      Soft tissue contusion in the right supraorbital region.     This facility minimizes radiation dose by adjusting the mA and/or kV  according to each patient size.        This CT scan was performed using one or more the following dose  reduction techniques:     -Automated exposure control,  -Adjustment of the mA and/or kV according to patient's size, and/or,  -Use of iterative reconstruction technique.        MERYL COVARRUBIAS MD               CT CERVICAL SPINE W/O CONTRAST, 2/13/2023 7:09 PM     History: Male, age 32 years; Neck pain; Trauma; Significant  trauma     Comparison: No relevant prior imaging.     TECHNIQUE: CT was performed of the cervical spine. Axial; sagittal and  coronal MIP images were reviewed..     FINDINGS: The  cervical spine demonstrates straightening secondary to  cervical collar. There is no evidence of acute or subacute fracture.  Vertebral bodies and posterior horns are well aligned. Scattered  degenerative changes are seen throughout the cervical spine, most  evident at the C6-7 level. Soft tissues of the neck demonstrate no  acute abnormality.                                                                      IMPRESSION:   No evidence of acute or subacute bony abnormality.     This facility minimizes radiation dose by adjusting the mA and/or kV  according to each patient size.        This CT scan was performed using one or more the following dose  reduction techniques:     -Automated exposure control,  -Adjustment of the mA and/or kV according to patient's size, and/or,  -Use of iterative reconstruction technique.       MERYL COVARRUBIAS MD               Allergies   Allergen Reactions     Morphine Sulfate Rash     Cats Other (See Comments)     Other reaction(s): Eye Irritation, Sneezing     Dogs Other (See Comments)     Other reaction(s): Eye Irritation, Sneezing     Morphine Hives         Review of Systems:  Constitutional, HEENT, cardiovascular, pulmonary, gi and gu systems are negative, except as otherwise noted.      OBJECTIVE:  Vitals:    02/22/23 1009   BP: 130/68   Pulse: 74   Resp: 18   Temp: 97.8  F (36.6  C)   SpO2: 98%               Exam:  GENERAL: healthy, alert and no distress  RESP: lungs clear to auscultation - no rales, no rhonchi, no wheezes  CV: regular rates and rhythm, normal S1 S2, no S3 or S4 and no murmur, no click or rub -  MS: Paraspinal cervical tightness, tenderness - left trapezius tightness          1. Cervicalgia  - Physical Therapy Referral; Future  - tiZANidine (ZANAFLEX) 4 MG tablet; Take 1 tablet (4 mg)  by mouth 3 times daily as needed for muscle spasms  Dispense: 60 tablet; Refill: 0  - Ibuprofen PRN  - Ice      Follow-up as needed        Yeimy BLAKE  [phone]

## 2023-02-22 NOTE — PATIENT INSTRUCTIONS
1. Cervicalgia  - Physical Therapy Referral; Future  - tiZANidine (ZANAFLEX) 4 MG tablet; Take 1 tablet (4 mg) by mouth 3 times daily as needed for muscle spasms  Dispense: 60 tablet; Refill: 0  - Ibuprofen PRN  - Ice      Follow-up as needed        Yeimy CORNEJO  [phone]

## 2023-02-23 NOTE — PROGRESS NOTES
"   02/14/23 1500   General Information   Type of Visit Initial OP Ortho PT Evaluation   Start of Care Date 02/14/23   Referring Physician Dr Rosales   Patient/Family Goals Statement reduce pain   Orders Evaluate and Treat   Orders Comment unexplained pelvic pain   Date of Order 02/09/23   Certification Required? No   Medical Diagnosis Testicular pain, constipation   Surgical/Medical history reviewed Yes   Precautions/Limitations no known precautions/limitations   Body Part(s)   Body Part(s) Pelvic Floor Dysfunction   Presentation and Etiology   Pertinent history of current problem (include personal factors and/or comorbidities that impact the POC) This 31 y/o male referred to Pelvic PT from Dr Rosales after main c/o's R testicular pain and lifetime constipation. Pt has incrd urinary urge with bending over and key in the door. Reports this pain/pelvic issue problem rated at a 7 on 0-10 scale. Pt has pain with sitting and starts at coccyx and runs down both legs. Very painful pelvis/testicle at night- does wake him up at night. R testicular pain with initial arousal and with orgasm. Reports belly clenching his whole life as he was previously\"fat\". this pelvs issue started approx 4 years ago insidiously.  signif L hip tightness- had piriformis PT treatment one year ago with min help.   Impairments A. Pain;P. Bowel or bladder problems   Functional Limitations perform activities of daily living;perform required work activities;perform desired leisure / sports activities   Symptom Location pelvis   How/Where did it occur From insidious onset   Chronicity Chronic   Pain rating (0-10 point scale) Best (/10);Worst (/10)   Worst (/10) 10   Pain quality G. Cramping;D. Burning;B. Dull;C. Aching;E. Shooting   Frequency of pain/symptoms B. Intermittent   Pain/symptoms exacerbated by A. Sitting;F. Nothing;G. Certain positions   Pain/symptoms eased by D. Nothing;E. Changing positions  (occas following \"squats\" at gym)   Progression of " "symptoms since onset: Worsened   Current Level of Function   Current Community Support Family/friend caregiver   Patient role/employment history A. 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 No   Have you fallen and had an injury in the past year? No   Is patient a fall risk? No   Abuse Screen (yes response referral indicated)   Feels Unsafe at Home or Work/School no   Feels Threatened by Someone no   Does Anyone Try to Keep You From Having Contact with Others or Doing Things Outside Your Home? no   Physical Signs of Abuse Present no   Patient needs abuse support services and resources No   Specific Questions   Specific Questions Pelvic Floor Dysfunction   Pelvic Floor Dysfunction Questions   Regular exercise Yes   Fluid intake-glasses/day (one glass/cup = 8oz 40 ounces   Caffeinated beverages-glasses/day 40 ounces   Alcoholic beverages - glasses/day 0   Recent diet change? No   How long can you delay the need to urinate?  not long   How many times do you wake to urinate at night?   1-2   How often do you urinate during the day?   every 1-2 hours- depending on fluid amount   Can you stop the flow of urine when on the toilet?  Yes   Is the volume of urine passed usually  Medium   Do you have the sensation that you need to go to the toilet?  Yes   Do you empty your bladder frequently, before you experience the urge to pass urine?  Yes   Do you have \"triggers\" that make you feel you can't wait to go to the toilet?  Yes  (bending over, key in the door.)   Number of bladder infections last year?  0   Frequency of bowel movements:  1x day   Consistency of stool?  Soft formed   Do you ignore the urge to defecate?  Yes   Pelvic Floor Dysfunction Objective Findings   Posture muscular build, stands guarded   Pelvic Floor Dysfunction Comments Non relaxing pelvic floor " causing signif pain and functional deficits in defecation and intercourse. Pain in pelvis is daily and rarely reduces   Type of Storage Problem frequency;urgency;nocturia   Type of Emptying Problem strain to void;incomplete emptying;hesitancy;postvoid dribbling;constipation   Areas of Tightness Adductors;Iliopsoas;Hamstrings;Piriformis;Gluteals   Pain-pelvic dysfunction   (R>L External PFM tightness/tenderness outer rim rectum. Unable to palpated internally this date d/t signif tone/tightness. Anteriorly, R PFM Tender- pain up to 4./10 Bulbo/ischio to palpation)   Protection needed None   Medications   (see chart)   Planned Therapy Interventions   Planned Therapy Interventions manual therapy;neuromuscular re-education;stretching;strengthening   Clinical Impression   Criteria for Skilled Therapeutic Interventions Met yes, treatment indicated   PT Diagnosis Non relaxing pelvic floor causing testicular pain and constipation   Influenced by the following impairments pain, tightness, guarding, constipation, occasional UI, urgency   Functional limitations due to impairments work, sleep, intercourse   Clinical Presentation Evolving/Changing   Clinical Presentation Rationale clinical judgement   Clinical Decision Making (Complexity) Moderate complexity   Therapy Frequency 1 time/week   Predicted Duration of Therapy Intervention (days/wks) 12 weeks   Risk & Benefits of therapy have been explained Yes   Patient, Family & other staff in agreement with plan of care Yes   Pelvic Health Informed Consent Statement Discussed with patient/guardian reason for referral regarding pelvic health needs and external/internal pelvic floor muscle examination.  Opportunity provided to ask questions and verbal consent for assessment and intervention was given.   Education Assessment   Barriers to Learning No barriers   ORTHO GOALS   PT Ortho Eval Goals 1;2;3   Ortho Goal 1   Goal Identifier STG 1   Goal Description Pt will laura garza HEP  compliance   Target Date 03/23/23   Ortho Goal 2   Goal Identifier STG 2   Goal Description Pt will report reduced pain in pelvis, testicle to 4/10 less than 50% of the time on 0-10 scale to enable him to sleep and have intercourse.   Target Date 04/20/23   Ortho Goal 3   Goal Identifier LTG   Goal Description ADLs and intercourse not affected by constipation and pain   Target Date 05/18/23   Total Evaluation Time   PT Eval, Moderate Complexity Minutes (85660) 45

## 2023-02-24 ENCOUNTER — TELEPHONE (OUTPATIENT)
Dept: FAMILY MEDICINE | Facility: OTHER | Age: 33
End: 2023-02-24

## 2023-02-24 NOTE — LETTER
[facility]  [address]  [address]  [place] [address]  Phone: [phone]    February 28, 2023        Kannan Ordonez  [address] [facility] [address]          To whom it may concern:    RE: Kannan Ordonez    Patient has PT and appointment with me 03/03/2023 for work comp evaluation. Patient unable to work until evaluation is complete to determine restrictions and if able to go back to work.       Please contact me for questions or concerns.      Sincerely,        Yeimy Corona, CNP

## 2023-02-24 NOTE — LETTER
[facility]  [address]  [address]  [place] [address]  Phone: [phone]    February 24, 2023        Kannan Ordonez  [address]  [facility] [address]          To whom it may concern:    RE: Kannan Ordonez    Patient has notified us that per his  and legal, he will be required to be out of work for his cervical spine pain until he is evaluated and cleared by Physical Therapy.    Will send an updated communication when he is cleared for work.    Please contact me for questions or concerns.      Sincerely,        Yeimy Corona, CNP

## 2023-03-03 ENCOUNTER — OFFICE VISIT (OUTPATIENT)
Dept: FAMILY MEDICINE | Facility: OTHER | Age: 33
End: 2023-03-03
Attending: NURSE PRACTITIONER
Payer: OTHER MISCELLANEOUS

## 2023-03-03 VITALS
SYSTOLIC BLOOD PRESSURE: 142 MMHG | WEIGHT: 245.6 LBS | DIASTOLIC BLOOD PRESSURE: 78 MMHG | TEMPERATURE: 98 F | OXYGEN SATURATION: 96 % | HEART RATE: 84 BPM | BODY MASS INDEX: 31.53 KG/M2

## 2023-03-03 DIAGNOSIS — M54.2 CERVICALGIA: Primary | ICD-10-CM

## 2023-03-03 DIAGNOSIS — E03.9 HYPOTHYROIDISM, UNSPECIFIED TYPE: ICD-10-CM

## 2023-03-03 PROCEDURE — 99213 OFFICE O/P EST LOW 20 MIN: CPT | Performed by: NURSE PRACTITIONER

## 2023-03-03 RX ORDER — LEVOTHYROXINE SODIUM 25 UG/1
TABLET ORAL
Qty: 90 TABLET | Refills: 1 | Status: SHIPPED | OUTPATIENT
Start: 2023-03-03 | End: 2023-05-12

## 2023-03-03 ASSESSMENT — PAIN SCALES - GENERAL: PAINLEVEL: MODERATE PAIN (5)

## 2023-03-03 NOTE — LETTER
REPORT OF WORK COMP    [facility]  [address]  [address]  [place] [address]  [phone]      PATIENT DATA    Employee Name: Kannan Ordonez      : 1990     #: [id number]      Work related injury: Yes  Employer at time of injury: [employer]  Employer contact & phone: On File  Employed elsewhere? No  Workers' Compensation Carrier/Managed Care Plan:  On File      Today's date: 3/3/2023  Date of injury: 23  Date of first visit: 23      PROVIDER EVALUATION: Please fill in as needed.  Please give copy to employee for employer.    1. Diagnosis: Cervical spine pain, with altered ROM, and radiation of pain to bicep and trapezius.     2. Treatment: Muscle relaxer, Anti-inflammatory, Ice, PT has been ordered.  3. Medication: Muscle relaxer (Zanaflex) and Ibuprofen  NOTE: When ordering a medication, [place] Rules require [place] Comp or WC on prescriptions.      He has not worked since his injury.  Initially he felt he could be released to work, but his symptoms have failed to improve and in fact have increased.  PT has been ordered.       ** WITH RESTRICTIONS?  N/A        RESTRICTIONS: Unlimited unless listed.  Restrictions apply to home and leisure also.  If work restrictions is not available, the employee is totally disabled.    Maximum Medical Improvement (Date): N/A  Any Permanent Partial Disability? Deferred to future exam/consult.    Provider comments: PT evaluation scheduled    Medical Examiner: Yeimy Corona          License or registration: [email or web address]-[email or web address]    Next appointment: following PT clearance      CC: Employer, Managed Care Plan/Payor, Patient

## 2023-03-03 NOTE — PROGRESS NOTES
Occupational Visit         SUBJECTIVE:  Kannan Ordonez, 33 year old, male is seen for follow up of occupational injury. Date of injury is 02/13/2023.        Linked Episodes   Type: Episode: Status: Noted: Resolved: Last update: Updated by:   WORK COMP [employer] Active 2/13/2023  3/3/2023  8:54 AM Sydnie Lo LPN      Comments:         Neck Pain    Duration: 02/13/2023    Description:  Location: neck  Radiation: none    Intensity:  4/10    Accompanying signs and symptoms: difficulty turning neck    History (similar episodes/previous evaluation): None    Precipitating or alleviating factors: None    Therapies tried and outcome: starting PT        I have seen him twice for this condition previously, which is related to a work comp injury.  He feels he is not improving, though he had wanted to return to work, his symptoms have increased - left sided neck pain - posterior, to trapezius muscle, to shoulder.      He sees PT today.         Allergies   Allergen Reactions     Morphine Sulfate Rash     Cats Other (See Comments)     Other reaction(s): Eye Irritation, Sneezing     Dogs Other (See Comments)     Other reaction(s): Eye Irritation, Sneezing     Morphine Hives           Review of Systems:  Constitutional, HEENT, cardiovascular, pulmonary, gi and gu systems are negative, except as otherwise noted.        OBJECTIVE:  Vitals:    03/03/23 0851   BP: (!) 142/78   Pulse: 84   Temp: 98  F (36.7  C)   SpO2: 96%           Exam:  GENERAL: healthy, alert, well nourished, well hydrated, no distress  HENT: ear canals- normal; TMs- normal; Nose- normal; Mouth- no ulcers, no lesions  NECK: no tenderness, no adenopathy, no asymmetry, no masses, no stiffness; thyroid- normal to palpation  RESP: lungs clear to auscultation - no rales, no rhonchi, no wheezes  CV: regular rates and rhythm, normal S1 S2, no S3 or S4 and no murmur, no click or rub -  MS:  Left cervical paraspinal tenderness, pain with  extension of neck and lateral ROM primarily the left.  Radiates to left bicep and left trapezius area.         1. Cervicalgia  - Continue plan of care  - PT pending  - Follow-up after PT clearance        [facility]y [facility]  [phone]

## 2023-03-03 NOTE — TELEPHONE ENCOUNTER
Levothyroxine 25mcg      Last Written Prescription Date:  2/28/22  Last Fill Quantity: 90,   # refills: 1  Last Office Visit: 3/3/23  Future Office visit:       Routing refill request to provider for review/approval because:

## 2023-03-03 NOTE — PATIENT INSTRUCTIONS
1. Cervicalgia  - Continue plan of care  - PT pending  - Follow-up after PT clearance        Yeimy Corona [facility]  [phone]

## 2023-03-06 ENCOUNTER — LAB (OUTPATIENT)
Dept: LAB | Facility: OTHER | Age: 33
End: 2023-03-06
Payer: COMMERCIAL

## 2023-03-06 DIAGNOSIS — Z13.220 LIPID SCREENING: ICD-10-CM

## 2023-03-06 DIAGNOSIS — Z12.5 SCREENING PSA (PROSTATE SPECIFIC ANTIGEN): ICD-10-CM

## 2023-03-06 DIAGNOSIS — Z51.81 ENCOUNTER FOR MONITORING TESTOSTERONE REPLACEMENT THERAPY: ICD-10-CM

## 2023-03-06 DIAGNOSIS — Z13.29 THYROID DISORDER SCREEN: ICD-10-CM

## 2023-03-06 DIAGNOSIS — Z13.9 SCREENING PROCEDURE: Primary | ICD-10-CM

## 2023-03-06 DIAGNOSIS — Z79.890 ENCOUNTER FOR MONITORING TESTOSTERONE REPLACEMENT THERAPY: ICD-10-CM

## 2023-03-06 DIAGNOSIS — Z13.9 SCREENING PROCEDURE: ICD-10-CM

## 2023-03-06 LAB
ALBUMIN SERPL BCG-MCNC: 4.4 G/DL (ref 3.5–5.2)
ALP SERPL-CCNC: 99 U/L (ref 40–129)
ALT SERPL W P-5'-P-CCNC: 36 U/L (ref 10–50)
ANION GAP SERPL CALCULATED.3IONS-SCNC: 14 MMOL/L (ref 7–15)
AST SERPL W P-5'-P-CCNC: 24 U/L (ref 10–50)
BASOPHILS # BLD AUTO: 0 10E3/UL (ref 0–0.2)
BASOPHILS NFR BLD AUTO: 0 %
BILIRUB SERPL-MCNC: 0.3 MG/DL
BUN SERPL-MCNC: 25.4 MG/DL (ref 6–20)
CALCIUM SERPL-MCNC: 9.6 MG/DL (ref 8.6–10)
CHLORIDE SERPL-SCNC: 104 MMOL/L (ref 98–107)
CHOLEST SERPL-MCNC: 161 MG/DL
CREAT SERPL-MCNC: 1.06 MG/DL (ref 0.67–1.17)
DEPRECATED HCO3 PLAS-SCNC: 21 MMOL/L (ref 22–29)
EOSINOPHIL # BLD AUTO: 0.3 10E3/UL (ref 0–0.7)
EOSINOPHIL NFR BLD AUTO: 3 %
ERYTHROCYTE [DISTWIDTH] IN BLOOD BY AUTOMATED COUNT: 11.9 % (ref 10–15)
ESTRADIOL SERPL-MCNC: 23 PG/ML
GFR SERPL CREATININE-BSD FRML MDRD: >90 ML/MIN/1.73M2
GLUCOSE SERPL-MCNC: 80 MG/DL (ref 70–99)
HCT VFR BLD AUTO: 45.1 % (ref 40–53)
HDLC SERPL-MCNC: 51 MG/DL
HGB BLD-MCNC: 15.5 G/DL (ref 13.3–17.7)
LDLC SERPL CALC-MCNC: 94 MG/DL
LYMPHOCYTES # BLD AUTO: 2 10E3/UL (ref 0.8–5.3)
LYMPHOCYTES NFR BLD AUTO: 19 %
MCH RBC QN AUTO: 30.9 PG (ref 26.5–33)
MCHC RBC AUTO-ENTMCNC: 34.4 G/DL (ref 31.5–36.5)
MCV RBC AUTO: 90 FL (ref 78–100)
MONOCYTES # BLD AUTO: 0.6 10E3/UL (ref 0–1.3)
MONOCYTES NFR BLD AUTO: 6 %
NEUTROPHILS # BLD AUTO: 7.3 10E3/UL (ref 1.6–8.3)
NEUTROPHILS NFR BLD AUTO: 72 %
NONHDLC SERPL-MCNC: 110 MG/DL
PLATELET # BLD AUTO: 208 10E3/UL (ref 150–450)
POTASSIUM SERPL-SCNC: 3.9 MMOL/L (ref 3.4–5.3)
PROT SERPL-MCNC: 7.2 G/DL (ref 6.4–8.3)
PSA SERPL-MCNC: 0.24 NG/ML
RBC # BLD AUTO: 5.01 10E6/UL (ref 4.4–5.9)
SODIUM SERPL-SCNC: 139 MMOL/L (ref 136–145)
TRIGL SERPL-MCNC: 79 MG/DL
TSH SERPL DL<=0.005 MIU/L-ACNC: 4.84 UIU/ML (ref 0.3–4.2)
WBC # BLD AUTO: 10.2 10E3/UL (ref 4–11)

## 2023-03-06 PROCEDURE — G0103 PSA SCREENING: HCPCS

## 2023-03-06 PROCEDURE — 80050 GENERAL HEALTH PANEL: CPT

## 2023-03-06 PROCEDURE — 84403 ASSAY OF TOTAL TESTOSTERONE: CPT

## 2023-03-06 PROCEDURE — 36415 COLL VENOUS BLD VENIPUNCTURE: CPT

## 2023-03-06 PROCEDURE — 82670 ASSAY OF TOTAL ESTRADIOL: CPT

## 2023-03-06 PROCEDURE — 80061 LIPID PANEL: CPT

## 2023-03-08 ENCOUNTER — HOSPITAL ENCOUNTER (OUTPATIENT)
Dept: PHYSICAL THERAPY | Facility: OTHER | Age: 33
Discharge: HOME | End: 2023-03-08
Attending: NURSE PRACTITIONER | Admitting: NURSE PRACTITIONER
Payer: OTHER MISCELLANEOUS

## 2023-03-08 DIAGNOSIS — M54.2 CERVICALGIA: ICD-10-CM

## 2023-03-08 LAB — TESTOST SERPL-MCNC: 232 NG/DL (ref 240–950)

## 2023-03-08 PROCEDURE — 97162 PT EVAL MOD COMPLEX 30 MIN: CPT | Mod: GP

## 2023-03-08 PROCEDURE — 97110 THERAPEUTIC EXERCISES: CPT | Mod: GP

## 2023-03-08 PROCEDURE — 97161 PT EVAL LOW COMPLEX 20 MIN: CPT | Mod: GP
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
## 2023-03-10 ENCOUNTER — HOSPITAL ENCOUNTER (OUTPATIENT)
Dept: PHYSICAL THERAPY | Facility: OTHER | Age: 33
Discharge: HOME | End: 2023-03-10
Attending: NURSE PRACTITIONER | Admitting: NURSE PRACTITIONER
Payer: OTHER MISCELLANEOUS

## 2023-03-10 PROCEDURE — 97140 MANUAL THERAPY 1/> REGIONS: CPT | Mod: GP

## 2023-03-10 PROCEDURE — 97035 APP MDLTY 1+ULTRASOUND EA 15: CPT | Mod: GP

## 2023-03-14 ENCOUNTER — HOSPITAL ENCOUNTER (OUTPATIENT)
Dept: PHYSICAL THERAPY | Facility: OTHER | Age: 33
Discharge: HOME | End: 2023-03-14
Attending: NURSE PRACTITIONER
Payer: OTHER MISCELLANEOUS

## 2023-03-14 DIAGNOSIS — M25.512 ACUTE PAIN OF LEFT SHOULDER: Primary | ICD-10-CM

## 2023-03-14 PROCEDURE — 97140 MANUAL THERAPY 1/> REGIONS: CPT | Mod: GP

## 2023-03-14 PROCEDURE — 97035 APP MDLTY 1+ULTRASOUND EA 15: CPT | Mod: GP

## 2023-03-16 ENCOUNTER — HOSPITAL ENCOUNTER (OUTPATIENT)
Dept: PHYSICAL THERAPY | Facility: OTHER | Age: 33
Discharge: HOME | End: 2023-03-16
Attending: NURSE PRACTITIONER
Payer: OTHER MISCELLANEOUS

## 2023-03-16 ENCOUNTER — HOSPITAL ENCOUNTER (OUTPATIENT)
Dept: PHYSICAL THERAPY | Facility: HOSPITAL | Age: 33
Setting detail: THERAPIES SERIES
Discharge: HOME | End: 2023-03-16
Attending: UROLOGY
Payer: COMMERCIAL

## 2023-03-16 PROCEDURE — 97140 MANUAL THERAPY 1/> REGIONS: CPT | Mod: GP

## 2023-03-16 PROCEDURE — 97035 APP MDLTY 1+ULTRASOUND EA 15: CPT | Mod: GP

## 2023-03-16 PROCEDURE — 97110 THERAPEUTIC EXERCISES: CPT | Mod: GP

## 2023-03-16 PROCEDURE — 97530 THERAPEUTIC ACTIVITIES: CPT | Mod: GP

## 2023-03-21 ENCOUNTER — HOSPITAL ENCOUNTER (EMERGENCY)
Facility: HOSPITAL | Age: 33
Discharge: HOME | End: 2023-03-21
Admitting: NURSE PRACTITIONER
Payer: OTHER MISCELLANEOUS

## 2023-03-21 ENCOUNTER — TELEPHONE (OUTPATIENT)
Dept: FAMILY MEDICINE | Facility: OTHER | Age: 33
End: 2023-03-21

## 2023-03-21 ENCOUNTER — HOSPITAL ENCOUNTER (OUTPATIENT)
Dept: PHYSICAL THERAPY | Facility: HOSPITAL | Age: 33
Setting detail: THERAPIES SERIES
Discharge: HOME | End: 2023-03-21
Attending: UROLOGY
Payer: COMMERCIAL

## 2023-03-21 ENCOUNTER — HOSPITAL ENCOUNTER (OUTPATIENT)
Dept: PHYSICAL THERAPY | Facility: OTHER | Age: 33
Discharge: HOME | End: 2023-03-21
Attending: NURSE PRACTITIONER
Payer: OTHER MISCELLANEOUS

## 2023-03-21 VITALS
TEMPERATURE: 97 F | RESPIRATION RATE: 16 BRPM | OXYGEN SATURATION: 97 % | DIASTOLIC BLOOD PRESSURE: 76 MMHG | SYSTOLIC BLOOD PRESSURE: 182 MMHG | HEART RATE: 67 BPM

## 2023-03-21 DIAGNOSIS — M54.12 CERVICAL RADICULOPATHY: ICD-10-CM

## 2023-03-21 PROCEDURE — G0463 HOSPITAL OUTPT CLINIC VISIT: HCPCS

## 2023-03-21 PROCEDURE — 97140 MANUAL THERAPY 1/> REGIONS: CPT | Mod: GP

## 2023-03-21 PROCEDURE — 99213 OFFICE O/P EST LOW 20 MIN: CPT | Performed by: NURSE PRACTITIONER

## 2023-03-21 PROCEDURE — 97530 THERAPEUTIC ACTIVITIES: CPT | Mod: GP

## 2023-03-21 PROCEDURE — 97110 THERAPEUTIC EXERCISES: CPT | Mod: GP

## 2023-03-21 ASSESSMENT — ENCOUNTER SYMPTOMS
NECK PAIN: 1
NAUSEA: 0
NECK STIFFNESS: 1
CHILLS: 0
SHORTNESS OF BREATH: 0
NUMBNESS: 1
HEADACHES: 1
FEVER: 0
ACTIVITY CHANGE: 1
VOMITING: 0

## 2023-03-21 NOTE — TELEPHONE ENCOUNTER
Form received 3-21-23 ,placed in provider's wall bin.   After form is completed patient would like form to be: PLEASE CALL PATIENT AND HE WILL PICK THEM UP. [phone]

## 2023-03-22 ENCOUNTER — HOSPITAL ENCOUNTER (OUTPATIENT)
Dept: MRI IMAGING | Facility: HOSPITAL | Age: 33
Discharge: HOME | End: 2023-03-22
Attending: NURSE PRACTITIONER
Payer: OTHER MISCELLANEOUS

## 2023-03-22 DIAGNOSIS — M25.512 ACUTE PAIN OF LEFT SHOULDER: ICD-10-CM

## 2023-03-22 DIAGNOSIS — M54.12 CERVICAL RADICULOPATHY: ICD-10-CM

## 2023-03-22 DIAGNOSIS — M25.512 ACUTE PAIN OF LEFT SHOULDER: Primary | ICD-10-CM

## 2023-03-22 PROCEDURE — 73221 MRI JOINT UPR EXTREM W/O DYE: CPT | Mod: LT

## 2023-03-22 PROCEDURE — 72141 MRI NECK SPINE W/O DYE: CPT

## 2023-03-22 NOTE — ED PROVIDER NOTES
History     Chief Complaint   Patient presents with     Neck Injury     HPI  Kannan Ordonez is a 33 year old male who sustained a neck injury at his place of employment, on February 13, when his head hit the front of an excavator window causing his head to hyperextend laterally.  Was evaluated at that time in ER with a CT.  Continues to have left arm weakness, left-sided neck pain that radiates into the left shoulder, forearm, and hand.  Accompanied with headaches and tingling into his left hand.  Has taken ibuprofen without relief of his discomfort.  Is  to be scheduled for MRI of his shoulder but has not heard back from the clinic or his place of employment.  Non-smoker.  Denies fevers, chills, nausea, vomiting, shortness of breath, and chest pain.    Neck Pain      Duration: Approximately 5 weeks    Description:  Location: left side   Radiation: into the left neck, nto the left shoulder, into the left forearm and into the left hand    Intensity:  5/10    Accompanying signs and symptoms: Left arm weakness, left neck pain that radiates into left shoulder, forearm, and hand.  Headache and tingling into his left hand.    History (similar episodes/previous evaluation): None    Precipitating or alleviating factors: None    Therapies tried and outcome: ibuprofen      Allergies:  Allergies   Allergen Reactions     Morphine Sulfate Rash     Cats Other (See Comments)     Other reaction(s): Eye Irritation, Sneezing     Dogs Other (See Comments)     Other reaction(s): Eye Irritation, Sneezing     Morphine Hives       Problem List:    Patient Active Problem List    Diagnosis Date Noted     Bilateral low back pain with bilateral sciatica 07/18/2022     Priority: Medium     Acquired hypothyroidism 11/26/2021     Priority: Medium     Hashimoto's thyroiditis 08/28/2018     Priority: Medium     Reactive airway disease that is not asthma 02/12/2018     Priority: Medium     Replacing diagnoses that were inactivated after  the 10/1/2021 regulatory import.       Vitamin D deficiency 10/24/2017     Priority: Medium     ACP (advance care planning) 05/26/2016     Priority: Medium     Advance Care Planning 5/26/2016: ACP Review of Chart / Resources Provided:  Reviewed chart for advance care plan.  Kannan Ordonez has been provided information and resources to begin or update their advance care plan.  Added by ELVA IBARRA                Past Medical History:    Past Medical History:   Diagnosis Date     Acquired hypothyroidism 11/26/2021     Anxiety 6/24/2016     Asthma      Hashimoto's thyroiditis 8/28/2018     Heart murmur 11/20/2017     Hyperlipidemia LDL goal <100 12/16/2015     Hypertension 3/3/2015     Hypothyroidism 5/1/2015     Reactive airway disease that is not asthma 2/12/2018     Recurrent major depressive disorder (H) 6/24/2016     Vitamin D deficiency 10/24/2017       Past Surgical History:    Past Surgical History:   Procedure Laterality Date     IR CONSULTATION FOR IR EXAM  12/8/2021     IR FLUORO 0-1 HOUR  12/20/2021     ORTHOPEDIC SURGERY  2015    r shoulder teat bone spur     ORTHOPEDIC SURGERY  2-2016    AC joint right     ORTHOPEDIC SURGERY  2016    Rt labrial tear     ORTHOPEDIC SURGERY  06/2017    revision decompression subpectoral biceps tenodesis        Family History:    Family History   Problem Relation Age of Onset     Diabetes Mother      Hypertension Mother      Hyperlipidemia Mother      Coronary Artery Disease Father      Hyperlipidemia Father      Hypertension Father      Thyroid Disease No family hx of        Social History:  Marital Status:  Single [1]  Social History     Tobacco Use     Smoking status: Never     Smokeless tobacco: Current   Substance Use Topics     Alcohol use: Yes     Comment: occ     Drug use: No        Medications:    levothyroxine (SYNTHROID/LEVOTHROID) 25 MCG tablet  tiZANidine (ZANAFLEX) 4 MG tablet          Review of Systems   Constitutional: Positive for activity change.  Negative for chills and fever.   Respiratory: Negative for shortness of breath.    Cardiovascular: Negative for chest pain.   Gastrointestinal: Negative for nausea and vomiting.   Musculoskeletal: Positive for neck pain and neck stiffness.   Neurological: Positive for numbness (tingling right arm to fingers) and headaches.       Physical Exam   BP: (!) 182/76  Pulse: 67  Temp: 97  F (36.1  C)  Resp: 16  SpO2: 97 %      Physical Exam  Vitals and nursing note reviewed.   Constitutional:       General: He is in acute distress.      Appearance: He is normal weight.   HENT:      Head: Normocephalic.   Cardiovascular:      Rate and Rhythm: Normal rate and regular rhythm.      Heart sounds: Normal heart sounds. No murmur heard.  Pulmonary:      Effort: Pulmonary effort is normal. No respiratory distress.      Breath sounds: Normal breath sounds. No wheezing or rales.   Musculoskeletal:      Left shoulder: Decreased range of motion. Decreased strength. Normal pulse.      Left hand: Decreased range of motion. Decreased strength (Thumb to index finger decreased ability to maintain a [place] against resistance indicating injury related to C6). Normal capillary refill. Normal pulse.      Cervical back: Tenderness present. Pain with movement and muscular tenderness present. No spinous process tenderness. Decreased range of motion.   Skin:     General: Skin is dry.      Findings: No bruising or erythema.   Neurological:      Mental Status: He is alert.      Motor: Weakness (Left arm weakness against vertical upward and downward resistance) present.         ED Course                 Procedures             No results found for this or any previous visit (from the past 24 hour(s)).    Medications - No data to display    Assessments & Plan (with Medical Decision Making)     I have reviewed the nursing notes.    I have reviewed the findings, diagnosis, plan and need for follow up with the patient.  (M54.12) Cervical  radiculopathy  Comment: 33 year old male who sustained a neck injury at his place of employment, on February 13, when his head hit the front of an excavator window causing his head to hyperextend laterally.  Was evaluated at that time in ER with a CT.  Continues to have left arm weakness, left-sided neck pain that radiates into the left shoulder, forearm, and hand.  Accompanied with headaches and tingling into his left hand.  Has taken ibuprofen without relief of his discomfort.  Is  to be scheduled for MRI of his shoulder but has not heard back from the clinic or his place of employment.  Non-smoker.  Denies fevers, chills, nausea, vomiting, shortness of breath, and chest pain.    MDM: NHT. Lungs CTA  No pain with palpation over cervical spine  Obvious weakness in left arm against vertical upward and downward resistance, and the ability to maintain a [place] with his thumb and index finger against resistance.    Refuses any type of pain medication    Plan: Education provided for neck pain  Keep affected extremity elevated as much as possible for next 24 - 48 hours. Ice to affected area 20 minutes every hour as needed for comfort. After 48 hours you can apply heat. Ibuprofen 600 to 800 mg (3 - 4 tabs of over the counter med) every six to eight hours as needed;not to exceed maximum amount of 3200 mg in 24 hours. Take with food. Tylenol 650 to 1000 mg every four to six hours as needed (not to exceed more than 4000 mg in a 24 hour period). May use interchangeably. Suggest medicating around the clock for the next 24-48 hours. U Follow up with primary provider or orthopedics as needed   MRI Cervical spine w/o contrast order placed        These discharge instructions and medications were reviewed with him and understanding verbalized.    This document was prepared using a combination of typing and voice generated software.  While every attempt was made for accuracy, spelling and grammatical errors may exist.    Medical  Decision Making  The patient's presentation was of low complexity (an acute and uncomplicated illness or injury).    The patient's evaluation involved:  history and exam without other MDM data elements    The patient's management necessitated only low risk treatment.    Discharge Medication List as of 3/21/2023  9:56 PM          Final diagnoses:   Cervical radiculopathy       3/21/2023   [facility]       Lulu Erazo, CNP  03/22/23 1050

## 2023-03-22 NOTE — ED TRIAGE NOTES
Patient presents with c/o old neck injury from a month ago, was CT and doing PT today and was unable to do exercises d/t pain. Patient reports burning sensation in left side of neck and left shoulder. Patient reports using ibuprofen without relief.

## 2023-03-22 NOTE — RESULT ENCOUNTER NOTE
Please report findings below.  Should see ortho  OA referral entered      IMPRESSION:   Findings suggesting subacromial impingement with downward slope and  undersurface spurring of the acromion process, impinging upon the  rotator cuff and mild rotator cuff tendinosis.     Mild degenerative changes at the AC joint with subarticular edema.  These changes may related to residual edema from recent injury or  perhaps related to a repetitive injury.     Limited evaluation of the labrum suggests a small focus of  degenerative tearing/fraying involving the anterior superior labrum  between 1:00 and 2:00.     MERYL COVARRUBIAS MD

## 2023-03-22 NOTE — ED TRIAGE NOTES
Pt presents with c/o neck pain  States that it feel like his skin is burning on his left side of his neck, he cant turn is his head all the way, also gets tingling down in his fingers.  ibu is not helping

## 2023-03-22 NOTE — DISCHARGE INSTRUCTIONS
Keep affected extremity elevated as much as possible for next 24 - 48 hours. Ice to affected area 20 minutes every hour as needed for comfort. After 48 hours you can apply heat. Ibuprofen 600 to 800 mg (3 - 4 tabs of over the counter med) every six to eight hours as needed;not to exceed maximum amount of 3200 mg in 24 hours. Take with food. Tylenol 650 to 1000 mg every four to six hours as needed (not to exceed more than 4000 mg in a 24 hour period). May use interchangeably. Suggest medicating around the clock for the next 24-48 hours. U Follow up with primary provider or orthopedics as needed

## 2023-03-23 ENCOUNTER — HOSPITAL ENCOUNTER (OUTPATIENT)
Dept: PHYSICAL THERAPY | Facility: OTHER | Age: 33
Discharge: HOME | End: 2023-03-23
Attending: NURSE PRACTITIONER
Payer: OTHER MISCELLANEOUS

## 2023-03-23 PROCEDURE — 97010 HOT OR COLD PACKS THERAPY: CPT | Mod: GP

## 2023-03-23 PROCEDURE — 97530 THERAPEUTIC ACTIVITIES: CPT | Mod: GP

## 2023-03-27 ENCOUNTER — OFFICE VISIT (OUTPATIENT)
Dept: FAMILY MEDICINE | Facility: OTHER | Age: 33
End: 2023-03-27
Attending: NURSE PRACTITIONER
Payer: OTHER MISCELLANEOUS

## 2023-03-27 VITALS
RESPIRATION RATE: 18 BRPM | DIASTOLIC BLOOD PRESSURE: 68 MMHG | HEART RATE: 58 BPM | OXYGEN SATURATION: 97 % | BODY MASS INDEX: 31.46 KG/M2 | TEMPERATURE: 97.6 F | WEIGHT: 245 LBS | SYSTOLIC BLOOD PRESSURE: 132 MMHG

## 2023-03-27 DIAGNOSIS — M54.2 CERVICALGIA: Primary | ICD-10-CM

## 2023-03-27 DIAGNOSIS — M25.512 ACUTE PAIN OF LEFT SHOULDER: ICD-10-CM

## 2023-03-27 DIAGNOSIS — S00.83XD CONTUSION OF FOREHEAD, SUBSEQUENT ENCOUNTER: ICD-10-CM

## 2023-03-27 PROCEDURE — 99213 OFFICE O/P EST LOW 20 MIN: CPT | Performed by: NURSE PRACTITIONER

## 2023-03-27 RX ORDER — CYCLOBENZAPRINE HCL 10 MG
10 TABLET ORAL 3 TIMES DAILY PRN
Qty: 30 TABLET | Refills: 1 | Status: SHIPPED | OUTPATIENT
Start: 2023-03-27 | End: 2023-05-12

## 2023-03-27 ASSESSMENT — PAIN SCALES - GENERAL: PAINLEVEL: MILD PAIN (3)

## 2023-03-27 NOTE — PROGRESS NOTES
Assessment & Plan         1. Cervicalgia  - Discuss with Ortho        2. Contusion of forehead, subsequent encounter  - Resolved        3. Acute pain of left shoulder  - Ortho appointment as scheduled  - Flexeril as needed        Yeimy Corona CNP  [facility] - RODO Brunner is a 33 year old, presenting for the following health issues:  Forms and Work Related Injury        Neck Pain and shoulder  When did you first notice your pain? 02/13/2023   Have you seen anyone else for your pain? Yes - PT  How has your pain affected your ability to work? 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   Onset/Duration: 02/13/2023  Description:   Location: neck and shoulder  Radiation: into back of head  Intensity: 3/10  Progression of Symptoms:  same  Accompanying Signs & Symptoms:  Burning, tingling, prickly sensation in arm(s): YES  Numbness in arm(s): No  Weakness in arm(s):  No  Fever: No  Headache: YES  Nausea and/or vomiting: No  History:   Trauma: YES  Previous neck pain: No  Previous surgery or injections: No  Previous Imaging (MRI,X ray): YES  Precipitating or alleviating factors: None  Does movement impact the pain:  YES  Therapies tried and outcome:  physical therapy and ibuprofen        He has an appointment with Dr. Amado at [facility] this Friday          ER Visit 3/21/23    Chief Complaint   Patient presents with     Neck Injury      HPI  Kannan Ordonez is a 33 year old male who sustained a neck injury at his place of employment, on February 13, when his head hit the front of an excavator window causing his head to hyperextend laterally.  Was evaluated at that time in ER with a CT.  Continues to have left arm weakness, left-sided neck pain that radiates into the left shoulder, forearm, and hand.  Accompanied with headaches and tingling into his left hand.  Has taken ibuprofen without relief of  his discomfort.  Is  to be scheduled for MRI of his shoulder but has not heard back from the clinic or his place of employment.  Non-smoker.  Denies fevers, chills, nausea, vomiting, shortness of breath, and chest pain.       Neck Pain      Duration: Approximately 5 weeks    Description:  Location: left side   Radiation: into the left neck, nto the left shoulder, into the left forearm and into the left hand    Intensity:  5/10    Accompanying signs and symptoms: Left arm weakness, left neck pain that radiates into left shoulder, forearm, and hand.  Headache and tingling into his left hand.    History (similar episodes/previous evaluation): None    Precipitating or alleviating factors: None    Therapies tried and outcome: ibuprofen          Review of Systems   Constitutional: Positive for activity change. Negative for chills and fever.   Respiratory: Negative for shortness of breath.    Cardiovascular: Negative for chest pain.   Gastrointestinal: Negative for nausea and vomiting.   Musculoskeletal: Positive for neck pain and neck stiffness.   Neurological: Positive for numbness (tingling right arm to fingers) and headaches.          Physical Exam   BP: (!) 182/76  Pulse: 67  Temp: 97  F (36.1  C)  Resp: 16  SpO2: 97 %        Physical Exam  Vitals and nursing note reviewed.   Constitutional:       General: He is in acute distress.      Appearance: He is normal weight.   HENT:      Head: Normocephalic.   Cardiovascular:      Rate and Rhythm: Normal rate and regular rhythm.      Heart sounds: Normal heart sounds. No murmur heard.  Pulmonary:      Effort: Pulmonary effort is normal. No respiratory distress.      Breath sounds: Normal breath sounds. No wheezing or rales.   Musculoskeletal:      Left shoulder: Decreased range of motion. Decreased strength. Normal pulse.      Left hand: Decreased range of motion. Decreased strength (Thumb to index finger decreased ability to maintain a [place] against resistance indicating  injury related to C6). Normal capillary refill. Normal pulse.      Cervical back: Tenderness present. Pain with movement and muscular tenderness present. No spinous process tenderness. Decreased range of motion.   Skin:     General: Skin is dry.      Findings: No bruising or erythema.   Neurological:      Mental Status: He is alert.      Motor: Weakness (Left arm weakness against vertical upward and downward resistance) present.              MRI Cervical spine w/o contrast [[id number]] Resulted: 03/22/23 1910   Order Status: Completed Updated: 03/22/23 1911   Narrative:     Exam: MR CERVICAL SPINE W/O CONTRAST     Exam reason: Neck pain; Trauma; Significant trauma; Cervical CT result   available; Neurologic deficit; Persistent neurologic deficit; No   known/automatically detected potential contraindications to MRI;   Cervical radiculopathy     Technique:   -Sagittal T1, sagittal T2, sagittal STIR, and axial T2 weighted images   of the cervical spine were obtained without contrast.       Comparison: None.     Findings:     The typical cervical lordosis is slightly straightened.     No evidence of a diffuse marrow infiltrative process.  There are   endplate degenerative marrow changes, most significant at C6-C7     No cord signal abnormality.     No focal abnormalities of the craniocervical junction.     No focal abnormalities of the paraspinous soft tissues.     Cervical spine degenerative changes:     C2-C3: Minimal posterior disc osteophyte complex. Minimal central   canal narrowing. No significant neural foraminal narrowing. Mild facet   arthropathy.     C3-C4: Minimal posterior disc osteophyte complex. Minimal central   canal narrowing. Moderate to severe left and mild right neural   foraminal narrowing. Moderate facet arthropathy.     C4-C5: Small posterior disc osteophyte complex. Mild central canal   narrowing. Mild to moderate bilateral neural foraminal narrowing.   Moderate facet arthropathy.     C5-C6:  Minimal posterior disc osteophyte complex. Minimal central   canal narrowing. Moderate left and no significant right neural   foraminal narrowing. Moderate facet arthropathy.     C6-C7: Small posterior disc osteophyte complex. Mild central canal   narrowing. Moderate left and minimal right neural foraminal narrowing.   Moderate facet arthropathy.     C7-T1: Disc height is maintained. No significant central canal or   neural foraminal narrowing.    Impression:     Impression:   Moderate to severe left neural foraminal narrowing at C3-C4. Moderate   left neural foraminal narrowing at C5-C6 and C6-C7.     Moderate multilevel facet arthropathy.     No high-grade central canal narrowing.     CLAUS MAGUIRE MD           SYSTEM ID:  [id number]             MR Shoulder Left w/o Contrast [[id number]] Resulted: 03/22/23 1554   Order Status: Completed Updated: 03/22/23 1555   Narrative:     Examination: MR SHOULDER LEFT W/O CONTRAST   3/22/2023 3:45 PM     Clinical History: Male, age 33 years, Seeing PT, PT recommends a MRI.   Pls. Note, this is a work comp injury; Shoulder pain; Shoulder pain   without trauma or other complicating feature; No shoulder x-ray result   available; Acute pain of left shoulder     Comparison: No relevant prior imaging.     Technique:  Sagittal and coronal proton-density fat saturation T2;   axial proton density without with fat saturation.     Findings:   The AC joint is congruent and demonstrates mild hypertrophic   degenerative change with mild marrow edema in the subarticular bone.     There is downward slope and mild undersurface spurring the acromion   process, slightly upon the rotator cuff. Small volume of fluid   distends the subacromial/subdeltoid bursa.     The rotator cuff is intact and demonstrates mild tendinosis.     Biceps labral anchor is intact and the long head of the biceps tendon   is satisfactorily positioned within the bicipital groove.     The humerus is internally rotated,  limiting evaluation of the   subscapularis and teres minor which otherwise appear to be intact.   There is suggestion of mild tendinosis in the distal fibers of the   subscapularis.     Glenohumeral articulation is congruent. Slight signal heterogeneity   suggests a localized degenerative fraying involving the anterior   superior labrum from 1:00 to 2:00. The glenohumeral articular   cartilage is normal in thickness.           Muscles of the shoulder are normal in bulk and contour.    Impression:     IMPRESSION:   Findings suggesting subacromial impingement with downward slope and   undersurface spurring of the acromion process, impinging upon the   rotator cuff and mild rotator cuff tendinosis.     Mild degenerative changes at the AC joint with subarticular edema.   These changes may related to residual edema from recent injury or   perhaps related to a repetitive injury.     Limited evaluation of the labrum suggests a small focus of   degenerative tearing/fraying involving the anterior superior labrum   between 1:00 and 2:00.     MERYL COVARRUBIAS MD           SYSTEM ID:  [id number]             Review of Systems   Constitutional, HEENT, cardiovascular, pulmonary, gi and gu systems are negative, except as otherwise noted.            Objective    /68 (BP Location: Left arm, Patient Position: Sitting, Cuff Size: Adult Large)   Pulse 58   Temp 97.6  F (36.4  C) (Tympanic)   Resp 18   Wt 111.1 kg (245 lb)   SpO2 97%   BMI 31.46 kg/m    Body mass index is 31.46 kg/m .           Physical Exam   GENERAL: healthy, alert and no distress  EYES: Eyes grossly normal to inspection, PERRL and conjunctivae and sclerae normal  NECK: no adenopathy, no asymmetry, masses, or scars and thyroid normal to palpation  RESP: lungs clear to auscultation - no rales, rhonchi or wheezes  CV: regular rate and rhythm, normal S1 S2, no S3 or S4, no murmur, click or rub, no peripheral edema and peripheral pulses strong  MS: Cervicalgia,  Left shoulder pain, loss of ROM  SKIN: no suspicious lesions or rashes  PSYCH: mentation appears normal, affect normal/bright          Patient is seen in conjunction with NP student.  History is reviewed with patient and pertinent portions of the exam are repeated.  Assessment and plan is reviewed with the patient.        Nikky Hill, NP Student

## 2023-04-11 ENCOUNTER — TELEPHONE (OUTPATIENT)
Dept: FAMILY MEDICINE | Facility: OTHER | Age: 33
End: 2023-04-11

## 2023-04-11 DIAGNOSIS — E03.9 ACQUIRED HYPOTHYROIDISM: Primary | ICD-10-CM

## 2023-04-11 NOTE — TELEPHONE ENCOUNTER
11:12 AM    Reason for Call: Phone Call    Description: Patient requesting a referral to endocrinology. Please give him a call     Was an appointment offered for this call? No    Preferred method for responding to this message: Telephone Call  What is your phone number ?[phone]    If we cannot reach you directly, may we leave a detailed response at the number you provided? Yes    Can this message wait until your PCP/provider returns, if available today? Not applicable    Irina Gregory

## 2023-04-30 ENCOUNTER — HEALTH MAINTENANCE LETTER (OUTPATIENT)
Age: 33
End: 2023-04-30

## 2023-05-09 ENCOUNTER — LAB (OUTPATIENT)
Dept: LAB | Facility: OTHER | Age: 33
End: 2023-05-09
Payer: COMMERCIAL

## 2023-05-09 DIAGNOSIS — Z79.890 ENCOUNTER FOR MONITORING TESTOSTERONE REPLACEMENT THERAPY: ICD-10-CM

## 2023-05-09 DIAGNOSIS — Z13.89 MULTIPHASIC SCREENING: ICD-10-CM

## 2023-05-09 DIAGNOSIS — Z12.5 SCREENING PSA (PROSTATE SPECIFIC ANTIGEN): ICD-10-CM

## 2023-05-09 DIAGNOSIS — Z13.220 SCREENING FOR LIPOID DISORDERS: ICD-10-CM

## 2023-05-09 DIAGNOSIS — Z13.29 SCREENING FOR THYROID DISORDER: ICD-10-CM

## 2023-05-09 DIAGNOSIS — Z13.9 SCREENING FOR UNSPECIFIED CONDITION: Primary | ICD-10-CM

## 2023-05-09 DIAGNOSIS — Z51.81 ENCOUNTER FOR MONITORING TESTOSTERONE REPLACEMENT THERAPY: ICD-10-CM

## 2023-05-09 DIAGNOSIS — N95.1 CLIMACTERIC SYNDROME: ICD-10-CM

## 2023-05-09 LAB
ALBUMIN SERPL BCG-MCNC: 4 G/DL (ref 3.5–5.2)
ALP SERPL-CCNC: 100 U/L (ref 40–129)
ALT SERPL W P-5'-P-CCNC: 39 U/L (ref 10–50)
ANION GAP SERPL CALCULATED.3IONS-SCNC: 13 MMOL/L (ref 7–15)
AST SERPL W P-5'-P-CCNC: 24 U/L (ref 10–50)
BASOPHILS # BLD AUTO: 0 10E3/UL (ref 0–0.2)
BASOPHILS NFR BLD AUTO: 0 %
BILIRUB SERPL-MCNC: 0.5 MG/DL
BUN SERPL-MCNC: 14.3 MG/DL (ref 6–20)
CALCIUM SERPL-MCNC: 9.1 MG/DL (ref 8.6–10)
CHLORIDE SERPL-SCNC: 102 MMOL/L (ref 98–107)
CHOLEST SERPL-MCNC: 124 MG/DL
CREAT SERPL-MCNC: 1.2 MG/DL (ref 0.67–1.17)
DEPRECATED HCO3 PLAS-SCNC: 22 MMOL/L (ref 22–29)
EOSINOPHIL # BLD AUTO: 0.1 10E3/UL (ref 0–0.7)
EOSINOPHIL NFR BLD AUTO: 2 %
ERYTHROCYTE [DISTWIDTH] IN BLOOD BY AUTOMATED COUNT: 12.2 % (ref 10–15)
GFR SERPL CREATININE-BSD FRML MDRD: 82 ML/MIN/1.73M2
GLUCOSE SERPL-MCNC: 85 MG/DL (ref 70–99)
HCT VFR BLD AUTO: 47.2 % (ref 40–53)
HDLC SERPL-MCNC: 41 MG/DL
HGB BLD-MCNC: 15.8 G/DL (ref 13.3–17.7)
LDLC SERPL CALC-MCNC: 73 MG/DL
LYMPHOCYTES # BLD AUTO: 1.6 10E3/UL (ref 0.8–5.3)
LYMPHOCYTES NFR BLD AUTO: 29 %
MCH RBC QN AUTO: 30.8 PG (ref 26.5–33)
MCHC RBC AUTO-ENTMCNC: 33.5 G/DL (ref 31.5–36.5)
MCV RBC AUTO: 92 FL (ref 78–100)
MONOCYTES # BLD AUTO: 0.5 10E3/UL (ref 0–1.3)
MONOCYTES NFR BLD AUTO: 9 %
NEUTROPHILS # BLD AUTO: 3.3 10E3/UL (ref 1.6–8.3)
NEUTROPHILS NFR BLD AUTO: 61 %
NONHDLC SERPL-MCNC: 83 MG/DL
PLATELET # BLD AUTO: 198 10E3/UL (ref 150–450)
POTASSIUM SERPL-SCNC: 4.1 MMOL/L (ref 3.4–5.3)
PROT SERPL-MCNC: 6.7 G/DL (ref 6.4–8.3)
PSA SERPL DL<=0.01 NG/ML-MCNC: 0.33 NG/ML
RBC # BLD AUTO: 5.13 10E6/UL (ref 4.4–5.9)
SODIUM SERPL-SCNC: 137 MMOL/L (ref 136–145)
TRIGL SERPL-MCNC: 50 MG/DL
TSH SERPL DL<=0.005 MIU/L-ACNC: 4.83 UIU/ML (ref 0.3–4.2)
WBC # BLD AUTO: 5.4 10E3/UL (ref 4–11)

## 2023-05-09 PROCEDURE — 82670 ASSAY OF TOTAL ESTRADIOL: CPT | Performed by: GENERAL PRACTICE

## 2023-05-09 PROCEDURE — 36415 COLL VENOUS BLD VENIPUNCTURE: CPT | Performed by: GENERAL PRACTICE

## 2023-05-09 PROCEDURE — 80061 LIPID PANEL: CPT | Performed by: GENERAL PRACTICE

## 2023-05-09 PROCEDURE — 84403 ASSAY OF TOTAL TESTOSTERONE: CPT | Performed by: GENERAL PRACTICE

## 2023-05-09 PROCEDURE — 80050 GENERAL HEALTH PANEL: CPT | Performed by: GENERAL PRACTICE

## 2023-05-09 PROCEDURE — 84144 ASSAY OF PROGESTERONE: CPT | Performed by: GENERAL PRACTICE

## 2023-05-09 PROCEDURE — G0103 PSA SCREENING: HCPCS | Performed by: GENERAL PRACTICE

## 2023-05-10 LAB
ESTRADIOL SERPL-MCNC: 54 PG/ML
PROGEST SERPL-MCNC: 0.1 NG/ML (ref 0–0.2)

## 2023-05-11 LAB — TESTOST SERPL-MCNC: 955 NG/DL (ref 240–950)

## 2023-05-12 ENCOUNTER — OFFICE VISIT (OUTPATIENT)
Dept: FAMILY MEDICINE | Facility: OTHER | Age: 33
End: 2023-05-12
Attending: NURSE PRACTITIONER
Payer: COMMERCIAL

## 2023-05-12 VITALS
RESPIRATION RATE: 18 BRPM | DIASTOLIC BLOOD PRESSURE: 62 MMHG | WEIGHT: 244.6 LBS | HEART RATE: 65 BPM | BODY MASS INDEX: 31.4 KG/M2 | OXYGEN SATURATION: 98 % | SYSTOLIC BLOOD PRESSURE: 132 MMHG | TEMPERATURE: 97.8 F

## 2023-05-12 DIAGNOSIS — E03.9 HYPOTHYROIDISM, UNSPECIFIED TYPE: ICD-10-CM

## 2023-05-12 DIAGNOSIS — M62.89 PELVIC FLOOR DYSFUNCTION: ICD-10-CM

## 2023-05-12 DIAGNOSIS — M54.42 ACUTE BILATERAL LOW BACK PAIN WITH BILATERAL SCIATICA: Primary | ICD-10-CM

## 2023-05-12 DIAGNOSIS — M54.41 ACUTE BILATERAL LOW BACK PAIN WITH BILATERAL SCIATICA: Primary | ICD-10-CM

## 2023-05-12 PROCEDURE — 99214 OFFICE O/P EST MOD 30 MIN: CPT | Performed by: NURSE PRACTITIONER

## 2023-05-12 RX ORDER — LEVOTHYROXINE SODIUM 50 UG/1
50 TABLET ORAL DAILY
Qty: 90 TABLET | Refills: 1 | Status: SHIPPED | OUTPATIENT
Start: 2023-05-12 | End: 2023-12-12

## 2023-05-12 RX ORDER — METHOCARBAMOL 500 MG/1
500 TABLET, FILM COATED ORAL 4 TIMES DAILY PRN
Qty: 90 TABLET | Refills: 1 | Status: SHIPPED | OUTPATIENT
Start: 2023-05-12 | End: 2023-06-30

## 2023-05-12 ASSESSMENT — PAIN SCALES - GENERAL: PAINLEVEL: MILD PAIN (3)

## 2023-05-12 NOTE — PROGRESS NOTES
Assessment & Plan          Hypothyroidism, unspecified type  - levothyroxine (SYNTHROID/LEVOTHROID) 50 MCG tablet; Take 1 tablet (50 mcg) by mouth daily      Acute bilateral low back pain with bilateral sciatica  - MR Lumbar Spine w/o Contrast; Future  - Neurosurgery Referral  - Robaxin PRN      Pelvic floor dysfunction  - Neurosurgery Referral        Yeimy Corona CNP  [facility] - RODO Brunner is a 33 year old, presenting for the following health issues:  Back Pain        Chronic/Recurring Back Pain Follow Up    Where is your back pain located? (Select all that apply) low back bilateral    How would you describe your back pain?  burning, cramping and dull ache    Where does your back pain spread? the right and left buttock, the right and left  thigh, the right and left  knee and the right and left foot    Since your last clinic visit for back pain, how has your pain changed? always present, but gets better and worse    Does your back pain interfere with your job? No    Since your last visit, have you tried any new treatment? No         Increasing sciatica  Ongoing pelvic pain  Completed PT for pelvic floor concerns        MRI of the pelvis was performed on 12/30/2021 and demonstrates mild  edema involving the origin of the right gluteus minimus muscle. No  evidence of apparent abnormality of the hip joints, SI joints. No  evidence of apparent bursitis or abnormal fluid collection. No  evidence of marrow edema. Both the left and right hip joints are  normal in overall appearance without evidence of chondromalacia or  apparent labral abnormality.     Patient describes spontaneous onset of pain in the buttock regions  approximately 2 years ago, right worse than left.     Patient also describes chronic history of low back pain.     MRI lumbar spine 12/7/2021 demonstrates a small posterior central disc  protrusion at L5-S1 with annular fissuring. No distinct evidence of  nerve root  compression or neural compromise. Mild degenerative changes  also seen within the L3-4, L4-5 and L5-S1 facet joints.        IMPRESSION:  Bilateral buttock pain: Right-sided pain is likely associated with  localized edema involving the right gluteus medius minimus muscle at  its origin. No apparent abnormality is seen on the pelvic MRI that  would account for the left-sided discomfort. In light of the history  and the findings on the recent MRI, I feel that the discomfort is  likely related to a repetitive injury and physical therapy may be the  best option.       Low back pain: Annular fissuring of the L5-S1 disc and mild lumbar  facet joint degenerative changes may contribute to the patient's low  back symptoms.          RECOMMENDATIONS:  1.  Physical therapy consultation.  2.  If physical therapy was not helpful consider translaminar epidural  steroid injection at L4-5 (there is minimal epidural fat at L5-S1 and  attempting the procedure in this location carries a high risk of  thecal sac puncture).  3.  If the epidural steroid injection is not helpful consider medial  branch blocks and workup toward rhizotomy targeting the L3-4, L4-5 and  L5-S1 facet joints.       MERYL COVARRUBIAS MD              Hypothyroidism Follow-up    Since last visit, patient describes the following symptoms: Weight stable, no hair loss, no skin changes, no constipation, no loose stools  Low energy, tired all the time  TSH UTD, just above normal           Patient Active Problem List   Diagnosis     ACP (advance care planning)     Vitamin D deficiency     Reactive airway disease that is not asthma     Hashimoto's thyroiditis     Acquired hypothyroidism     Bilateral low back pain with bilateral sciatica     Past Surgical History:   Procedure Laterality Date     IR CONSULTATION FOR IR EXAM  12/8/2021     IR FLUORO 0-1 HOUR  12/20/2021     ORTHOPEDIC SURGERY  2015    r shoulder teat bone spur     ORTHOPEDIC SURGERY  2-2016    AC joint right      ORTHOPEDIC SURGERY  2016    Rt labrial tear     ORTHOPEDIC SURGERY  06/2017    revision decompression subpectoral biceps tenodesis        Social History     Tobacco Use     Smoking status: Never     Smokeless tobacco: Current   Vaping Use     Vaping status: Not on file   Substance Use Topics     Alcohol use: Yes     Comment: occ     Family History   Problem Relation Age of Onset     Diabetes Mother      Hypertension Mother      Hyperlipidemia Mother      Coronary Artery Disease Father      Hyperlipidemia Father      Hypertension Father      Thyroid Disease No family hx of                Current Outpatient Medications   Medication Sig Dispense Refill     levothyroxine (SYNTHROID/LEVOTHROID) 25 MCG tablet TAKE 1 TABLET(25 MCG) BY MOUTH DAILY 90 tablet 1     Allergies   Allergen Reactions     Morphine Sulfate Rash     Cats Other (See Comments)     Other reaction(s): Eye Irritation, Sneezing     Dogs Other (See Comments)     Other reaction(s): Eye Irritation, Sneezing     Morphine Hives         Recent Labs   Lab Test 05/09/23  0849 03/06/23  1136 06/29/21  1537 02/22/21  1433 11/17/17  0000 10/24/17  1439 06/08/17  0922 05/15/17  1630   A1C  --   --   --   --   --  5.1  --   --    LDL 73 94  --   --   --   --   --  125*   HDL 41 51  --   --   --   --   --  62   TRIG 50 79  --   --   --   --   --  136   ALT 39 36  --  69  --   --    < >  --    CR 1.20* 1.06  --  1.06   < > 0.95   < > 0.87   GFRESTIMATED 82 >90  --  >90  --  >90   < > >90  Non  GFR Calc     GFRESTBLACK  --   --   --  >90  --  >90   < > >90  African American GFR Calc     POTASSIUM 4.1 3.9  --  3.9   < > 3.9   < > 3.7   TSH 4.83* 4.84*   < > 7.80*   < > 1.94  --  3.08    < > = values in this interval not displayed.          BP Readings from Last 3 Encounters:   05/12/23 132/62   03/27/23 132/68   03/21/23 (!) 182/76    Wt Readings from Last 3 Encounters:   05/12/23 110.9 kg (244 lb 9.6 oz)   03/27/23 111.1 kg (245 lb)   03/03/23 111.4 kg  (245 lb 9.6 oz)                  Review of Systems   Constitutional, HEENT, cardiovascular, pulmonary, GI, , musculoskeletal, neuro, skin, endocrine and psych systems are negative, except as otherwise noted.            Objective    /62 (BP Location: Right arm, Patient Position: Sitting, Cuff Size: Adult Large)   Pulse 65   Temp 97.8  F (36.6  C) (Tympanic)   Resp 18   Wt 110.9 kg (244 lb 9.6 oz)   SpO2 98%   BMI 31.40 kg/m    Body mass index is 31.4 kg/m .           Physical Exam   GENERAL: healthy, alert and no distress  NECK: no adenopathy, no asymmetry, masses, or scars and thyroid normal to palpation  RESP: lungs clear to auscultation - no rales, rhonchi or wheezes  CV: regular rate and rhythm, normal S1 S2, no S3 or S4, no murmur, click or rub, no peripheral edema and peripheral pulses strong  MS: Lumbar pain / stiffness - bilateral SI joint pain  SKIN: no suspicious lesions or rashes  PSYCH: mentation appears normal, affect normal/bright

## 2023-05-12 NOTE — PATIENT INSTRUCTIONS
Assessment & Plan          Hypothyroidism, unspecified type  - levothyroxine (SYNTHROID/LEVOTHROID) 50 MCG tablet; Take 1 tablet (50 mcg) by mouth daily      Acute bilateral low back pain with bilateral sciatica  - MR Lumbar Spine w/o Contrast; Future  - Neurosurgery Referral  - Robaxin PRN      Pelvic floor dysfunction  - Neurosurgery Referral        Yeimy Corona CNP  [facility] - [place] [facility]

## 2023-05-15 NOTE — PROGRESS NOTES
"[facility]    Outpatient Physical Therapy Discharge Note  Patient: Kannan Ordonez  : 1990    Beginning/End Dates of Reporting Period:  3/08/23 to 3/23/23    Referring Provider: Yeimy Corona NP    Therapy Diagnosis: Cervicalgia     Client Self Report: Patient states he went into the [place] ER on 3/21/2023 due to left neck shoulder upper extremity \"burning \".  He did have a C-spine and left shoulder MRI yesterday.  He will be seeing his primary care provider tomorrow and will discuss further recommendations with her.  He continues to note left neck pain, left shoulder pain and left upper extremity burning to the fingers.    Objective Measurements: AROM L shoulder decreased sid with flexion. Decreased cervical spine extension and L rotation                                          Outcome Measures (most recent score):      Goals:  Goal Identifier     Goal Description     Target Date     Date Met      Progress (detail required for progress note):       Goal Identifier     Goal Description     Target Date     Date Met      Progress (detail required for progress note):       Goal Identifier     Goal Description     Target Date     Date Met      Progress (detail required for progress note):       Goal Identifier     Goal Description     Target Date     Date Met      Progress (detail required for progress note):       Goal Identifier     Goal Description     Target Date     Date Met      Progress (detail required for progress note):       Goal Identifier     Goal Description     Target Date     Date Met      Progress (detail required for progress note):       Goal Identifier     Goal Description     Target Date     Date Met      Progress (detail required for progress note):       Goal Identifier     Goal Description     Target Date     Date Met      Progress (detail required for progress note):   "           Plan:  Discharge from therapy.Goals are not met  Discharge:    Reason for Discharge: Had MRI and going to follow-up with primary care provider    Equipment Issued: none    Discharge Plan: Patient to continue home program.

## 2023-05-16 ENCOUNTER — TELEPHONE (OUTPATIENT)
Dept: FAMILY MEDICINE | Facility: OTHER | Age: 33
End: 2023-05-16

## 2023-05-16 ENCOUNTER — ALLIED HEALTH/NURSE VISIT (OUTPATIENT)
Dept: FAMILY MEDICINE | Facility: OTHER | Age: 33
End: 2023-05-16
Attending: NURSE PRACTITIONER
Payer: COMMERCIAL

## 2023-05-16 DIAGNOSIS — Z23 IMMUNIZATION, TETANUS TOXOID: ICD-10-CM

## 2023-05-16 DIAGNOSIS — Z23 NEED FOR IMMUNIZATION AGAINST TETANUS ALONE: Primary | ICD-10-CM

## 2023-05-16 PROCEDURE — 90471 IMMUNIZATION ADMIN: CPT

## 2023-05-16 PROCEDURE — 90715 TDAP VACCINE 7 YRS/> IM: CPT

## 2023-05-16 NOTE — TELEPHONE ENCOUNTER
Patient stepped on nail, last tetanus was 9 yrs ago. Recommend coming in for nurse only shot. Please sign order.

## 2023-06-22 ENCOUNTER — HOSPITAL ENCOUNTER (OUTPATIENT)
Dept: MRI IMAGING | Facility: HOSPITAL | Age: 33
Discharge: HOME | End: 2023-06-22
Attending: NURSE PRACTITIONER | Admitting: NURSE PRACTITIONER
Payer: COMMERCIAL

## 2023-06-22 DIAGNOSIS — M54.41 ACUTE BILATERAL LOW BACK PAIN WITH BILATERAL SCIATICA: ICD-10-CM

## 2023-06-22 DIAGNOSIS — M54.42 ACUTE BILATERAL LOW BACK PAIN WITH BILATERAL SCIATICA: ICD-10-CM

## 2023-06-22 PROCEDURE — 72148 MRI LUMBAR SPINE W/O DYE: CPT

## 2023-06-26 ENCOUNTER — TELEPHONE (OUTPATIENT)
Dept: CARE COORDINATION | Facility: OTHER | Age: 33
End: 2023-06-26

## 2023-06-26 DIAGNOSIS — R33.9 URINARY RETENTION: Primary | ICD-10-CM

## 2023-06-26 RX ORDER — TAMSULOSIN HYDROCHLORIDE 0.4 MG/1
CAPSULE ORAL
COMMUNITY
Start: 2023-06-21 | End: 2023-06-26

## 2023-06-26 RX ORDER — DEXTROAMPHETAMINE SACCHARATE, AMPHETAMINE ASPARTATE, DEXTROAMPHETAMINE SULFATE AND AMPHETAMINE SULFATE 7.5; 7.5; 7.5; 7.5 MG/1; MG/1; MG/1; MG/1
TABLET ORAL
COMMUNITY
Start: 2023-06-01 | End: 2023-11-22

## 2023-06-26 RX ORDER — TAMSULOSIN HYDROCHLORIDE 0.4 MG/1
0.4 CAPSULE ORAL DAILY
Qty: 30 CAPSULE | Refills: 0 | Status: SHIPPED | OUTPATIENT
Start: 2023-06-26 | End: 2023-06-30

## 2023-06-26 RX ORDER — LISDEXAMFETAMINE DIMESYLATE 70 MG/1
CAPSULE ORAL
COMMUNITY
Start: 2023-06-07 | End: 2024-05-14

## 2023-06-26 NOTE — TELEPHONE ENCOUNTER
Tamsulosin 0.04 MG Capsule      Last Written Prescription Date:  0  Last Fill Quantity: 0,   # refills: 0  Last Office Visit: 5/12/2023  Future Office visit:    Next 5 appointments (look out 90 days)    Jun 30, 2023  3:45 PM  (Arrive by 3:30 PM)  SHORT with Yeimy Corona CNP  [facility] ([facility] ) [address]  [facility] [address]  [phone]           Routing refill request to provider for review/approval because:    Kimberly Boecker, RN    **[facility] Fax Request**

## 2023-06-30 ENCOUNTER — OFFICE VISIT (OUTPATIENT)
Dept: FAMILY MEDICINE | Facility: OTHER | Age: 33
End: 2023-06-30
Attending: NURSE PRACTITIONER
Payer: COMMERCIAL

## 2023-06-30 VITALS
SYSTOLIC BLOOD PRESSURE: 150 MMHG | BODY MASS INDEX: 30.7 KG/M2 | HEART RATE: 73 BPM | WEIGHT: 239.1 LBS | OXYGEN SATURATION: 99 % | RESPIRATION RATE: 18 BRPM | DIASTOLIC BLOOD PRESSURE: 70 MMHG | TEMPERATURE: 97.8 F

## 2023-06-30 DIAGNOSIS — M54.41 BILATERAL LOW BACK PAIN WITH BILATERAL SCIATICA, UNSPECIFIED CHRONICITY: Primary | ICD-10-CM

## 2023-06-30 DIAGNOSIS — M25.50 MULTIPLE JOINT PAIN: ICD-10-CM

## 2023-06-30 DIAGNOSIS — E03.9 ACQUIRED HYPOTHYROIDISM: ICD-10-CM

## 2023-06-30 DIAGNOSIS — M54.42 BILATERAL LOW BACK PAIN WITH BILATERAL SCIATICA, UNSPECIFIED CHRONICITY: Primary | ICD-10-CM

## 2023-06-30 LAB — ERYTHROCYTE [SEDIMENTATION RATE] IN BLOOD BY WESTERGREN METHOD: 2 MM/HR (ref 0–15)

## 2023-06-30 PROCEDURE — 86038 ANTINUCLEAR ANTIBODIES: CPT | Performed by: NURSE PRACTITIONER

## 2023-06-30 PROCEDURE — 86431 RHEUMATOID FACTOR QUANT: CPT | Performed by: NURSE PRACTITIONER

## 2023-06-30 PROCEDURE — 85652 RBC SED RATE AUTOMATED: CPT | Performed by: NURSE PRACTITIONER

## 2023-06-30 PROCEDURE — 86618 LYME DISEASE ANTIBODY: CPT | Performed by: NURSE PRACTITIONER

## 2023-06-30 PROCEDURE — 36415 COLL VENOUS BLD VENIPUNCTURE: CPT | Performed by: NURSE PRACTITIONER

## 2023-06-30 PROCEDURE — 99214 OFFICE O/P EST MOD 30 MIN: CPT | Performed by: NURSE PRACTITIONER

## 2023-06-30 PROCEDURE — 86039 ANTINUCLEAR ANTIBODIES (ANA): CPT | Performed by: NURSE PRACTITIONER

## 2023-06-30 ASSESSMENT — ANXIETY QUESTIONNAIRES
3. WORRYING TOO MUCH ABOUT DIFFERENT THINGS: NOT AT ALL
1. FEELING NERVOUS, ANXIOUS, OR ON EDGE: NOT AT ALL
GAD7 TOTAL SCORE: 0
IF YOU CHECKED OFF ANY PROBLEMS ON THIS QUESTIONNAIRE, HOW DIFFICULT HAVE THESE PROBLEMS MADE IT FOR YOU TO DO YOUR WORK, TAKE CARE OF THINGS AT HOME, OR GET ALONG WITH OTHER PEOPLE: NOT DIFFICULT AT ALL
7. FEELING AFRAID AS IF SOMETHING AWFUL MIGHT HAPPEN: NOT AT ALL
GAD7 TOTAL SCORE: 0
6. BECOMING EASILY ANNOYED OR IRRITABLE: NOT AT ALL
2. NOT BEING ABLE TO STOP OR CONTROL WORRYING: NOT AT ALL
5. BEING SO RESTLESS THAT IT IS HARD TO SIT STILL: NOT AT ALL

## 2023-06-30 ASSESSMENT — PAIN SCALES - GENERAL: PAINLEVEL: NO PAIN (0)

## 2023-06-30 ASSESSMENT — PATIENT HEALTH QUESTIONNAIRE - PHQ9
SUM OF ALL RESPONSES TO PHQ QUESTIONS 1-9: 0
5. POOR APPETITE OR OVEREATING: NOT AT ALL

## 2023-06-30 NOTE — ADDENDUM NOTE
Addended by: WASHINGTON COLÓN on: 6/30/2023 03:47 PM     Modules accepted: Orders, Level of Service

## 2023-06-30 NOTE — PATIENT INSTRUCTIONS
Assessment & Plan          Bilateral low back pain with bilateral sciatica, unspecified chronicity  - Continue plan of care      Acquired hypothyroidism  - Continue plan of care      Multiple joint pain  - Lyme Disease Total Abs Bld with Reflex to Confirm CLIA; Future  - Anti Nuclear Chaya IgG by IFA with Reflex; Future  - ESR: Erythrocyte sedimentation rate; Future  - Rheumatoid factor; Future        Return in about 3 months (around 9/30/2023).        Yeimy Corona, CNP  [facility]

## 2023-06-30 NOTE — PROGRESS NOTES
Assessment & Plan          Bilateral low back pain with bilateral sciatica, unspecified chronicity  - Continue plan of care      Acquired hypothyroidism  - Continue plan of care      Multiple joint pain  - Lyme Disease Total Abs Bld with Reflex to Confirm CLIA; Future  - Anti Nuclear Chaya IgG by IFA with Reflex; Future  - ESR: Erythrocyte sedimentation rate; Future  - Rheumatoid factor; Future      Return in about 3 months (around 9/30/2023).        Yeimy Corona CNP  [facility] - [place]OD[place] Brunner is a 33 year old, presenting for the following health issues:  Thyroid Problem and Back Pain        Hypothyroidism Follow-up    Since last visit, patient describes the following symptoms: Weight stable, no hair loss, no skin changes, no constipation, no loose stools        Chronic/Recurring Back Pain Follow Up    Where is your back pain located? (Select all that apply) low back bilateral    How would you describe your back pain?  burning, cramping and dull ache    Where does your back pain spread? nowhere, the right and left buttock, the right and left  thigh and the right and left  knee    Since your last clinic visit for back pain, how has your pain changed? always present, but gets better and worse    Does your back pain interfere with your job? No    Since your last visit, have you tried any new treatment? No          MR LUMBAR SPINE W/O CONTRAST     HISTORY: 33 years Male Lumbar pain, sciatica; Low back pain;  Neurologic deficit, non-traumatic; LE numbness/paresthesia;  Increasing/persistent LE numbness/paresthesia; No known/automatically  detected potential contraindications to imaging; Acute bilateral low  back pain with bilateral sciatica; Acute bilateral low back pain with  bilateral sciatica     COMPARISONS: 12/7/2021     TECHNIQUE: Sagittal T1, sagittal T2, sagittal STIR, axial proton  density, axial T2 imaging of the lumbar spine was performed.     FINDINGS: Alignment of the lumbar  spine is normal .  there are  Schmorl's type endplate changes at L1, L2 and L3 unchanged from the  prior study. Bone marrow signal throughout the lumbar spine is  otherwise normal. Vertebral body height is normal.Signal intensity and  caliber of the lower thoracic spinal cord is normal. The conus  medullaris is at the T12-L1 level.     L5-S1: There is moderate loss of disc height and signal. There is a  mild posterior broad-based disc bulge. The central canal and  neuroforamen are patent. There is no significant interval change.     L4-L5: There is normal disc space height and signal. The central  spinal canal and neural foramen are patent.     L3-L4: There is normal disc space height and signal. The central  spinal canal and neural foramen are patent.     L2-L3: There is loss of disc height and signal. The central canal and  neuroforamen are widely patent.     L1-L2: Loss of disc signal with minimal loss of disc height. The  central canal and neuroforamen are patent                                                                        IMPRESSION: Mild degenerative disc disease of the lumbar spine. No  significant interval change. No evidence of disc herniation or focal  nerve root impingement.     ERROL RAYGOZA MD          He is seeing neurosurgery next week for facet joint injections    Is seeing a chiropractor        Patient Active Problem List   Diagnosis     ACP (advance care planning)     Vitamin D deficiency     Reactive airway disease that is not asthma     Hashimoto's thyroiditis     Acquired hypothyroidism     Bilateral low back pain with bilateral sciatica     Pelvic floor dysfunction     Past Surgical History:   Procedure Laterality Date     IR CONSULTATION FOR IR EXAM  12/8/2021     IR FLUORO 0-1 HOUR  12/20/2021     ORTHOPEDIC SURGERY  2015    r shoulder teat bone spur     ORTHOPEDIC SURGERY  2-2016    AC joint right     ORTHOPEDIC SURGERY  2016    Rt labrial tear     ORTHOPEDIC SURGERY  06/2017     revision decompression subpectoral biceps tenodesis        Social History     Tobacco Use     Smoking status: Never     Smokeless tobacco: Current   Substance Use Topics     Alcohol use: Yes     Comment: occ     Family History   Problem Relation Age of Onset     Diabetes Mother      Hypertension Mother      Hyperlipidemia Mother      Coronary Artery Disease Father      Hyperlipidemia Father      Hypertension Father      Thyroid Disease No family hx of                Current Outpatient Medications   Medication Sig Dispense Refill     amphetamine-dextroamphetamine (ADDERALL) 30 MG tablet take 1 tablet by mouth twice daily as directed       levothyroxine (SYNTHROID/LEVOTHROID) 50 MCG tablet Take 1 tablet (50 mcg) by mouth daily 90 tablet 1     VYVANSE 70 MG capsule TAKE 1 CAPSULE BY MOUTH ONCE DAILY AS DIRECTED           Allergies   Allergen Reactions     Morphine Sulfate Rash     Cats Other (See Comments)     Other reaction(s): Eye Irritation, Sneezing     Dogs Other (See Comments)     Other reaction(s): Eye Irritation, Sneezing     Morphine Hives         Recent Labs   Lab Test 05/09/23  0849 03/06/23  1136 06/29/21  1537 02/22/21  1433 11/17/17  0000 10/24/17  1439 06/08/17  0922 05/15/17  1630   A1C  --   --   --   --   --  5.1  --   --    LDL 73 94  --   --   --   --   --  125*   HDL 41 51  --   --   --   --   --  62   TRIG 50 79  --   --   --   --   --  136   ALT 39 36  --  69  --   --    < >  --    CR 1.20* 1.06  --  1.06   < > 0.95   < > 0.87   GFRESTIMATED 82 >90  --  >90  --  >90   < > >90  Non  GFR Calc     GFRESTBLACK  --   --   --  >90  --  >90   < > >90  African American GFR Calc     POTASSIUM 4.1 3.9  --  3.9   < > 3.9   < > 3.7   TSH 4.83* 4.84*   < > 7.80*   < > 1.94  --  3.08    < > = values in this interval not displayed.          BP Readings from Last 3 Encounters:   06/30/23 (!) 150/70   05/12/23 132/62   03/27/23 132/68    Wt Readings from Last 3 Encounters:   06/30/23 108.5 kg  (239 lb 1.6 oz)   05/12/23 110.9 kg (244 lb 9.6 oz)   03/27/23 111.1 kg (245 lb)               Review of Systems   Constitutional, HEENT, cardiovascular, pulmonary, gi and gu systems are negative, except as otherwise noted.          Objective    BP (!) 150/70 (BP Location: Left arm, Patient Position: Sitting, Cuff Size: Adult Large)   Pulse 73   Temp 97.8  F (36.6  C) (Tympanic)   Resp 18   Wt 108.5 kg (239 lb 1.6 oz)   SpO2 99%   BMI 30.70 kg/m    Body mass index is 30.7 kg/m .           Physical Exam   GENERAL: healthy, alert and no distress  NECK: no adenopathy, no asymmetry, masses, or scars and thyroid normal to palpation  RESP: lungs clear to auscultation - no rales, rhonchi or wheezes  CV: regular rate and rhythm, normal S1 S2, no S3 or S4, no murmur, click or rub, no peripheral edema and peripheral pulses strong  MS: paraspinal cervical tightness, stiffness - lumbar and hip stiffness  PSYCH: mentation appears normal, affect normal/bright

## 2023-07-03 LAB
ANA PAT SER IF-IMP: ABNORMAL
ANA PAT SER IF-IMP: ABNORMAL
ANA SER QL IF: POSITIVE
ANA TITR SER IF: ABNORMAL {TITER}
ANA TITR SER IF: ABNORMAL {TITER}
B BURGDOR IGG+IGM SER QL: 0.1
RHEUMATOID FACT SER NEPH-ACNC: <6 IU/ML

## 2023-07-07 DIAGNOSIS — R76.8 POSITIVE ANA (ANTINUCLEAR ANTIBODY): Primary | ICD-10-CM

## 2023-07-11 ENCOUNTER — LAB (OUTPATIENT)
Dept: LAB | Facility: OTHER | Age: 33
End: 2023-07-11
Payer: COMMERCIAL

## 2023-07-11 DIAGNOSIS — R76.8 POSITIVE ANA (ANTINUCLEAR ANTIBODY): ICD-10-CM

## 2023-07-11 PROCEDURE — 86225 DNA ANTIBODY NATIVE: CPT

## 2023-07-11 PROCEDURE — 36415 COLL VENOUS BLD VENIPUNCTURE: CPT

## 2023-07-11 PROCEDURE — 86235 NUCLEAR ANTIGEN ANTIBODY: CPT

## 2023-07-11 PROCEDURE — 86200 CCP ANTIBODY: CPT

## 2023-07-11 PROCEDURE — 86235 NUCLEAR ANTIGEN ANTIBODY: CPT | Mod: 91

## 2023-07-14 LAB
CCP AB SER IA-ACNC: 0.9 U/ML
CENTROMERE B AB SER-ACNC: <0.7 U/ML
CENTROMERE B AB SER-ACNC: NEGATIVE
DSDNA AB SER-ACNC: 1 IU/ML
ENA SM IGG SER IA-ACNC: 0.8 U/ML
ENA SM IGG SER IA-ACNC: NEGATIVE
ENA SS-A AB SER IA-ACNC: <0.5 U/ML
ENA SS-A AB SER IA-ACNC: NEGATIVE
ENA SS-B IGG SER IA-ACNC: <0.6 U/ML
ENA SS-B IGG SER IA-ACNC: NEGATIVE
U1 SNRNP IGG SER IA-ACNC: 1.1 U/ML
U1 SNRNP IGG SER IA-ACNC: NEGATIVE

## 2023-09-10 NOTE — TELEPHONE ENCOUNTER
10:44 AM    Reason for Call: Phone Call    Description: pt needs work notes removing pt from work for the WC CLAIM for doing physical therapy. NP Chloe canceled appt that was for today. Please call pt    Was an appointment offered for this call? No  If yes : Appointment type              Date    Preferred method for responding to this message: Telephone Call  What is your phone number ? [phone]    If we cannot reach you directly, may we leave a detailed response at the number you provided? Yes    Can this message wait until your PCP/provider returns, if available today? Jess Boone    
Called patient and scheduled for Friday. Patient is suppose to return to work tomorrow. Requesting letter to be out of work until appointment Friday to re-evaluate.   
I can see him Friday after PT  
Letter completed.  
Patient called and states the letter needs to be written a little differently and is requesting the nurse to call him to discuss. Please give him a call back   
Patient called. He has spoken to his union and  and they want him to be out of work until after physical therapy. He is stating that he is still have neck pain and daily headaches. He is told that if he is having issues and goes back to work with this will be an issue in the future if he continues to have issues. He is told that if he goes back to work when he is not recovered it will end up no longer being a work comp issue with the company but will fall on him. Patient is stating that he needs a letter from you stating that he is having ongoing issues and needs to be out of work until PT is completed. Please advise  
Pls do a letter taking him out through Friday    [facility]  [phone]  
See letter    [facility]  [phone]  
Spoke to tracy, states that you would need to be the one to take him out of work. States that he is sorry about back tracking but he can not turn his neck at all. He can not go back to work with any restrictions. He states if he goes back now its as if stating he is fine, so if any issues come of this neck problem his work would not pay for it because he went back to work. Patient is still requesting that you put him out of work until he does PT to see if that helps. Patient also stated it has nothing to do with the drug screen. Do you want him to schedule appt to discuss this with you? Please advise  
Spoke with patient and he has printed it off of [employer].  
Pt c/o chest pain since 10AM

## 2023-09-27 ENCOUNTER — APPOINTMENT (OUTPATIENT)
Dept: CT IMAGING | Facility: HOSPITAL | Age: 33
End: 2023-09-27
Attending: PHYSICIAN ASSISTANT
Payer: COMMERCIAL

## 2023-09-27 ENCOUNTER — HOSPITAL ENCOUNTER (EMERGENCY)
Facility: HOSPITAL | Age: 33
Discharge: HOME | End: 2023-09-27
Attending: PHYSICIAN ASSISTANT | Admitting: PHYSICIAN ASSISTANT
Payer: COMMERCIAL

## 2023-09-27 VITALS
DIASTOLIC BLOOD PRESSURE: 82 MMHG | OXYGEN SATURATION: 99 % | RESPIRATION RATE: 18 BRPM | TEMPERATURE: 98.3 F | HEIGHT: 74 IN | BODY MASS INDEX: 29.37 KG/M2 | HEART RATE: 100 BPM | SYSTOLIC BLOOD PRESSURE: 162 MMHG | WEIGHT: 228.84 LBS

## 2023-09-27 DIAGNOSIS — R10.9 ABDOMINAL PAIN OF UNKNOWN ETIOLOGY: Primary | ICD-10-CM

## 2023-09-27 LAB
ALBUMIN SERPL BCG-MCNC: 4.5 G/DL (ref 3.5–5.2)
ALBUMIN UR-MCNC: NEGATIVE MG/DL
ALP SERPL-CCNC: 88 U/L (ref 40–129)
ALT SERPL W P-5'-P-CCNC: 28 U/L (ref 0–70)
ANION GAP SERPL CALCULATED.3IONS-SCNC: 12 MMOL/L (ref 7–15)
APPEARANCE UR: CLEAR
AST SERPL W P-5'-P-CCNC: 29 U/L (ref 0–45)
BASOPHILS # BLD AUTO: 0 10E3/UL (ref 0–0.2)
BASOPHILS NFR BLD AUTO: 0 %
BILIRUB SERPL-MCNC: 0.6 MG/DL
BILIRUB UR QL STRIP: NEGATIVE
BUN SERPL-MCNC: 12.1 MG/DL (ref 6–20)
CALCIUM SERPL-MCNC: 9.5 MG/DL (ref 8.6–10)
CHLORIDE SERPL-SCNC: 104 MMOL/L (ref 98–107)
COLOR UR AUTO: ABNORMAL
CREAT SERPL-MCNC: 1.12 MG/DL (ref 0.67–1.17)
DEPRECATED HCO3 PLAS-SCNC: 23 MMOL/L (ref 22–29)
EGFRCR SERPLBLD CKD-EPI 2021: 89 ML/MIN/1.73M2
EOSINOPHIL # BLD AUTO: 0.3 10E3/UL (ref 0–0.7)
EOSINOPHIL NFR BLD AUTO: 3 %
ERYTHROCYTE [DISTWIDTH] IN BLOOD BY AUTOMATED COUNT: 11.9 % (ref 10–15)
GLUCOSE SERPL-MCNC: 87 MG/DL (ref 70–99)
GLUCOSE UR STRIP-MCNC: NEGATIVE MG/DL
HCT VFR BLD AUTO: 46.1 % (ref 40–53)
HGB BLD-MCNC: 15.7 G/DL (ref 13.3–17.7)
HGB UR QL STRIP: NEGATIVE
HOLD SPECIMEN: NORMAL
IMM GRANULOCYTES # BLD: 0 10E3/UL
IMM GRANULOCYTES NFR BLD: 0 %
KETONES UR STRIP-MCNC: ABNORMAL MG/DL
LEUKOCYTE ESTERASE UR QL STRIP: NEGATIVE
LIPASE SERPL-CCNC: 26 U/L (ref 13–60)
LYMPHOCYTES # BLD AUTO: 2.3 10E3/UL (ref 0.8–5.3)
LYMPHOCYTES NFR BLD AUTO: 20 %
MCH RBC QN AUTO: 31 PG (ref 26.5–33)
MCHC RBC AUTO-ENTMCNC: 34.1 G/DL (ref 31.5–36.5)
MCV RBC AUTO: 91 FL (ref 78–100)
MONOCYTES # BLD AUTO: 0.8 10E3/UL (ref 0–1.3)
MONOCYTES NFR BLD AUTO: 7 %
NEUTROPHILS # BLD AUTO: 8.5 10E3/UL (ref 1.6–8.3)
NEUTROPHILS NFR BLD AUTO: 70 %
NITRATE UR QL: NEGATIVE
NRBC # BLD AUTO: 0 10E3/UL
NRBC BLD AUTO-RTO: 0 /100
PH UR STRIP: 6.5 [PH] (ref 4.7–8)
PLATELET # BLD AUTO: 216 10E3/UL (ref 150–450)
POTASSIUM SERPL-SCNC: 4 MMOL/L (ref 3.4–5.3)
PROT SERPL-MCNC: 6.5 G/DL (ref 6.4–8.3)
RBC # BLD AUTO: 5.07 10E6/UL (ref 4.4–5.9)
RBC URINE: 0 /HPF
SODIUM SERPL-SCNC: 139 MMOL/L (ref 135–145)
SP GR UR STRIP: 1.03 (ref 1–1.03)
SQUAMOUS EPITHELIAL: 0 /HPF
UROBILINOGEN UR STRIP-MCNC: NORMAL MG/DL
WBC # BLD AUTO: 11.9 10E3/UL (ref 4–11)
WBC URINE: 0 /HPF

## 2023-09-27 PROCEDURE — 85025 COMPLETE CBC W/AUTO DIFF WBC: CPT | Performed by: PHYSICIAN ASSISTANT

## 2023-09-27 PROCEDURE — 36415 COLL VENOUS BLD VENIPUNCTURE: CPT | Performed by: PHYSICIAN ASSISTANT

## 2023-09-27 PROCEDURE — 80053 COMPREHEN METABOLIC PANEL: CPT | Performed by: PHYSICIAN ASSISTANT

## 2023-09-27 PROCEDURE — 83690 ASSAY OF LIPASE: CPT | Performed by: PHYSICIAN ASSISTANT

## 2023-09-27 PROCEDURE — 99284 EMERGENCY DEPT VISIT MOD MDM: CPT | Performed by: PHYSICIAN ASSISTANT

## 2023-09-27 PROCEDURE — 81001 URINALYSIS AUTO W/SCOPE: CPT | Performed by: PHYSICIAN ASSISTANT

## 2023-09-27 PROCEDURE — 250N000011 HC RX IP 250 OP 636: Performed by: PHYSICIAN ASSISTANT

## 2023-09-27 PROCEDURE — 99285 EMERGENCY DEPT VISIT HI MDM: CPT | Mod: 25

## 2023-09-27 PROCEDURE — 74177 CT ABD & PELVIS W/CONTRAST: CPT

## 2023-09-27 RX ORDER — LORAZEPAM 1 MG/1
1 TABLET ORAL EVERY 6 HOURS PRN
Qty: 10 TABLET | Refills: 0 | Status: SHIPPED | OUTPATIENT
Start: 2023-09-27 | End: 2023-11-22

## 2023-09-27 RX ORDER — IOPAMIDOL 755 MG/ML
500 INJECTION, SOLUTION INTRAVASCULAR ONCE
Status: DISCONTINUED | OUTPATIENT
Start: 2023-09-27 | End: 2023-09-27

## 2023-09-27 RX ORDER — IOPAMIDOL 755 MG/ML
500 INJECTION, SOLUTION INTRAVASCULAR ONCE
Status: COMPLETED | OUTPATIENT
Start: 2023-09-27 | End: 2023-09-27

## 2023-09-27 RX ADMIN — IOPAMIDOL 500 ML: 755 INJECTION, SOLUTION INTRAVENOUS at 19:38

## 2023-09-27 ASSESSMENT — ACTIVITIES OF DAILY LIVING (ADL): ADLS_ACUITY_SCORE: 35

## 2023-09-27 NOTE — ED NOTES
"34 yo male presents with acute abd pain, VS WDL, lungs CTA, +bsx4, states he has been dealing with abd pain and diff urination x 4 years, while exercising abd today, felt \"pop and \"tear\" from inferior umbilical area down to the top of pubic bone, states pain causes right testicle to \"retract\" upward, states 2/10 pain at rest, 4/10 pain w/activity, pt unable to urinate at this time without severe difficulty, 18g PIV placed in left AC and SL, MD assessed.  "

## 2023-09-27 NOTE — ED TRIAGE NOTES
"Patient presents with c/o medial lower abdominal pain. Patient reports that he was doing AB workouts causing pain from lower belly button to pubic bone and right testicle. Patient reports that he felt a tearing sensation and that he right testicle went up and he got nauseated. Patient reports that testicle pain resolved but still having abdominal. Patent reports that it \"feels hard to pee and bare down.\"        "

## 2023-09-27 NOTE — ED PROVIDER NOTES
"  History     Chief Complaint   Patient presents with    Abdominal Pain     HPI  Kannan Ordonez is a 33 year old male who presented to the emergency department ambulatory for evaluation of lower abdominal pain and right testicular pain.  Symptoms have been ongoing waxing waning for approximate last 4 years.  He has seen several specialists including several urologist.  The patient is also seeing orthopedic surgery.  He is ultimately been diagnosed with pudendal neuralgia.  Reports he was doing an ab workout and felt a ripping or tearing sensation with \"my right testicle sucked up.\"  Testicular symptoms have improved.  He has undergone appendectomy in the past.    Allergies:  Allergies   Allergen Reactions    Morphine Sulfate Rash    Cats Other (See Comments)     Other reaction(s): Eye Irritation, Sneezing    Dogs Other (See Comments)     Other reaction(s): Eye Irritation, Sneezing    Morphine Hives       Problem List:    Patient Active Problem List    Diagnosis Date Noted    Pelvic floor dysfunction 05/12/2023     Priority: Medium    Bilateral low back pain with bilateral sciatica 07/18/2022     Priority: Medium    Acquired hypothyroidism 11/26/2021     Priority: Medium    Hashimoto's thyroiditis 08/28/2018     Priority: Medium    Reactive airway disease that is not asthma 02/12/2018     Priority: Medium     Replacing diagnoses that were inactivated after the 10/1/2021 regulatory import.      Vitamin D deficiency 10/24/2017     Priority: Medium    ACP (advance care planning) 05/26/2016     Priority: Medium     Advance Care Planning 5/26/2016: ACP Review of Chart / Resources Provided:  Reviewed chart for advance care plan.  Kannan Ordonez has been provided information and resources to begin or update their advance care plan.  Added by ELVA IBARRA                Past Medical History:    Past Medical History:   Diagnosis Date    Acquired hypothyroidism 11/26/2021    Anxiety 6/24/2016    Asthma     " "Hashimoto's thyroiditis 8/28/2018    Heart murmur 11/20/2017    Hyperlipidemia LDL goal <100 12/16/2015    Hypertension 3/3/2015    Hypothyroidism 5/1/2015    Reactive airway disease that is not asthma 2/12/2018    Recurrent major depressive disorder (H) 6/24/2016    Vitamin D deficiency 10/24/2017       Past Surgical History:    Past Surgical History:   Procedure Laterality Date    IR CONSULTATION FOR IR EXAM  12/8/2021    IR FLUORO 0-1 HOUR  12/20/2021    ORTHOPEDIC SURGERY  2015    r shoulder teat bone spur    ORTHOPEDIC SURGERY  2-2016    AC joint right    ORTHOPEDIC SURGERY  2016    Rt labrial tear    ORTHOPEDIC SURGERY  06/2017    revision decompression subpectoral biceps tenodesis        Family History:    Family History   Problem Relation Age of Onset    Diabetes Mother     Hypertension Mother     Hyperlipidemia Mother     Coronary Artery Disease Father     Hyperlipidemia Father     Hypertension Father     Thyroid Disease No family hx of        Social History:  Marital Status:  Single [1]  Social History     Tobacco Use    Smoking status: Never    Smokeless tobacco: Current   Vaping Use    Vaping Use: Never used   Substance Use Topics    Alcohol use: Yes     Comment: occ    Drug use: No        Medications:    LORazepam (ATIVAN) 1 MG tablet  amphetamine-dextroamphetamine (ADDERALL) 30 MG tablet  levothyroxine (SYNTHROID/LEVOTHROID) 50 MCG tablet  VYVANSE 70 MG capsule          Review of Systems   Genitourinary:         See HPI   Musculoskeletal:         See HPI       Physical Exam   BP: (!) 185/85  Pulse: 98  Temp: 97.6  F (36.4  C)  Resp: 18  Height: 188 cm (6' 2.02\")  Weight: 104.1 kg (229 lb 8 oz)  SpO2: 99 %      Physical Exam  Vitals and nursing note reviewed.   Constitutional:       General: He is not in acute distress.     Appearance: Normal appearance. He is normal weight. He is not ill-appearing, toxic-appearing or diaphoretic.   Cardiovascular:      Rate and Rhythm: Normal rate and regular rhythm. "   Pulmonary:      Effort: Pulmonary effort is normal.   Abdominal:      Palpations: Abdomen is soft.      Tenderness: There is abdominal tenderness.   Skin:     General: Skin is warm and dry.      Capillary Refill: Capillary refill takes less than 2 seconds.   Neurological:      General: No focal deficit present.      Mental Status: He is alert and oriented to person, place, and time.         ED Course              ED Course as of 09/27/23 2038   Wed Sep 27, 2023   1902 Differential diagnosis is broad and includes but not limited to abdominal wall strain, hernia, mesenteric ischemia, small bowel obstruction, referred testicular pain, ureteral stone.     Procedures              Critical Care time:  none               Results for orders placed or performed during the hospital encounter of 09/27/23 (from the past 24 hour(s))   CBC with platelets differential    Narrative    The following orders were created for panel order CBC with platelets differential.  Procedure                               Abnormality         Status                     ---------                               -----------         ------                     CBC with platelets and d...[[id number]]  Abnormal            Final result                 Please view results for these tests on the individual orders.   Comprehensive metabolic panel   Result Value Ref Range    Sodium 139 135 - 145 mmol/L    Potassium 4.0 3.4 - 5.3 mmol/L    Carbon Dioxide (CO2) 23 22 - 29 mmol/L    Anion Gap 12 7 - 15 mmol/L    Urea Nitrogen 12.1 6.0 - 20.0 mg/dL    Creatinine 1.12 0.67 - 1.17 mg/dL    GFR Estimate 89 >60 mL/min/1.73m2    Calcium 9.5 8.6 - 10.0 mg/dL    Chloride 104 98 - 107 mmol/L    Glucose 87 70 - 99 mg/dL    Alkaline Phosphatase 88 40 - 129 U/L    AST 29 0 - 45 U/L    ALT 28 0 - 70 U/L    Protein Total 6.5 6.4 - 8.3 g/dL    Albumin 4.5 3.5 - 5.2 g/dL    Bilirubin Total 0.6 <=1.2 mg/dL   Lipase   Result Value Ref Range    Lipase 26 13 - 60 U/L   CBC with  platelets and differential   Result Value Ref Range    WBC Count 11.9 (H) 4.0 - 11.0 10e3/uL    RBC Count 5.07 4.40 - 5.90 10e6/uL    Hemoglobin 15.7 13.3 - 17.7 g/dL    Hematocrit 46.1 40.0 - 53.0 %    MCV 91 78 - 100 fL    MCH 31.0 26.5 - 33.0 pg    MCHC 34.1 31.5 - 36.5 g/dL    RDW 11.9 10.0 - 15.0 %    Platelet Count 216 150 - 450 10e3/uL    % Neutrophils 70 %    % Lymphocytes 20 %    % Monocytes 7 %    % Eosinophils 3 %    % Basophils 0 %    % Immature Granulocytes 0 %    NRBCs per 100 WBC 0 <1 /100    Absolute Neutrophils 8.5 (H) 1.6 - 8.3 10e3/uL    Absolute Lymphocytes 2.3 0.8 - 5.3 10e3/uL    Absolute Monocytes 0.8 0.0 - 1.3 10e3/uL    Absolute Eosinophils 0.3 0.0 - 0.7 10e3/uL    Absolute Basophils 0.0 0.0 - 0.2 10e3/uL    Absolute Immature Granulocytes 0.0 <=0.4 10e3/uL    Absolute NRBCs 0.0 10e3/uL   Extra Tube    Narrative    The following orders were created for panel order Extra Tube.  Procedure                               Abnormality         Status                     ---------                               -----------         ------                     Extra Blue Top Tube[[id number]]                              Final result               Extra Red Top Tube[[id number]]                               Final result               Extra Heparinized Syringe[[id number]]                        Final result                 Please view results for these tests on the individual orders.   Extra Blue Top Tube   Result Value Ref Range    Hold Specimen JIC    Extra Red Top Tube   Result Value Ref Range    Hold Specimen JIC    Extra Heparinized Syringe   Result Value Ref Range    Hold Specimen JIC    CT Abdomen Pelvis w Contrast    Narrative    Exam:CT ABDOMEN PELVIS W CONTRAST    History: 33 years Male Periumbilical pain and right testicular pain.   Significantly worse after weight lifting    Comparisons:    Technique: Axial CT imaging of the abdomen and pelvis was performed  with contrast. Coronal and sagittal  reconstructions were obtained.   This exam was performed using one or more of the following dose  reduction techniques:  Automated exposure control, adjustment of the MAC and/or KV according  to patient's size, and/or use of iterative reconstruction technique.       FINDINGS:  Lung bases:The lung bases are clear    Abdomen:  Liver:Unremarkable  Gallbladder and biliary tree:No calcified gallstones are present.  There is no evidence of biliary dilatation.  Pancreas:Unremarkable  Spleen:Unremarkable  Adrenals:Normal    Kidneys and ureters:Unremarkable    Lymph nodes:There is no significant lymphadenopathy    Bowel:No abnormally distended or thickened loops of bowel are present.  There is no evidence of bowel obstruction.    Appendix: Not visualized.    Vessels:Unremarkable    Osseous structures:Unremarkable    Pelvis:There is no evidence of mass or lymphadenopathy. No abnormal  fluid collections are present.  The sacroiliac joints and pubic symphysis are congruent. Both hip  joints are congruent. There is no evidence of pelvic fracture.    No soft tissue abnormalities are evident. There is no evidence of  hematoma.          Impression    IMPRESSION: No acute changes.    ERROL RAYGOZA MD         SYSTEM ID:  [id number]   UA with Microscopic reflex to Culture    Specimen: Urine, Clean Catch   Result Value Ref Range    Color Urine Straw Colorless, Straw, Light Yellow, Yellow    Appearance Urine Clear Clear    Glucose Urine Negative Negative mg/dL    Bilirubin Urine Negative Negative    Ketones Urine Trace (A) Negative mg/dL    Specific Gravity Urine 1.033 1.003 - 1.035    Blood Urine Negative Negative    pH Urine 6.5 4.7 - 8.0    Protein Albumin Urine Negative Negative mg/dL    Urobilinogen Urine Normal Normal, 2.0 mg/dL    Nitrite Urine Negative Negative    Leukocyte Esterase Urine Negative Negative    RBC Urine 0 <=2 /HPF    WBC Urine 0 <=5 /HPF    Squamous Epithelials Urine 0 <=1 /HPF    Narrative    Urine Culture not  indicated       Medications   iopamidol (ISOVUE-370) solution 500 mL (500 mLs Intravenous $Given 9/27/23 1938)   sodium chloride (PF) 0.9% PF flush 60 mL (60 mLs Intravenous $Given 9/27/23 1939)       Assessments & Plan (with Medical Decision Making)   33-year-old male with a history of 4 years of suprapubic discomfort as well as lower urinary tract symptoms.  Work-up in the emergency department today was discussed with the patient and has been essentially unrevealing for an emergent etiology.  Patient symptoms are quite consistent with pelvic floor dysfunction.  Long detailed discussion.  Would likely benefit from outpatient therapy as well as possible SSRI etc.  We will file a short course of Ativan for the patient's rather profound anxiety.  However, the patient has had distant lower abdominal pain and rectal pain for several years and would likely benefit from general surgery consultation for possible endoscopy etc.  He will return here for any new or worsening symptoms.  The patient was quite happy and agreeable with the plan.    This document was prepared using a combination of typing and voice generated software.  While every attempt was made for accuracy, spelling and grammatical errors may exist.     I have reviewed the nursing notes.    I have reviewed the findings, diagnosis, plan and need for follow up with the patient.           Medical Decision Making  The patient's presentation was of moderate complexity (a chronic illness mild to moderate exacerbation, progression, or side effect of treatment).    The patient's evaluation involved:  review of 3+ test result(s) ordered prior to this encounter (multiple previous labs and clinic visits)  ordering and/or review of 3+ test(s) in this encounter (multiple labs and CT scan)    The patient's management necessitated moderate risk (prescription drug management including medications given in the ED).        New Prescriptions    LORAZEPAM (ATIVAN) 1 MG TABLET     Take 1 tablet (1 mg) by mouth every 6 hours as needed for anxiety       Final diagnoses:   Abdominal pain of unknown etiology       9/27/2023   [facility]       Shital Park PA-C  09/27/23 2040

## 2023-09-28 NOTE — DISCHARGE INSTRUCTIONS
Your work-up in the emergency department today was unrevealing for an etiology of your symptoms.  Please follow-up in the clinic for recheck.  Please return here for any new or worsening symptoms.    I have sent a message to and regarding possible further treatment as well as a general surgery referral.

## 2023-11-21 NOTE — PROGRESS NOTES
[facility]  [address]  [facility] [address]  Phone: [phone]  Primary Provider: Yeimy Colón  Pre-op Performing Provider: YEIMY COLÓN        PREOPERATIVE EVALUATION:  Today's date: 11/22/2023        Kannan is a 33 year old, presenting for the following:  Pre-Op Exam        Surgical Information:  Surgery/Procedure: Nerve Block - Cervical  Surgery Location: [facility]  Surgeon: Dr. Phillip  Surgery Date: 11/27/2023  Time of Surgery: TBD  Where patient plans to recover: At home with family  Fax number for surgical facility: on file          HPI related to upcoming procedure:     Cervicalgia        Exam: MR CERVICAL SPINE W/O CONTRAST     Exam reason: Neck pain; Trauma; Significant trauma; Cervical CT result  available; Neurologic deficit; Persistent neurologic deficit; No  known/automatically detected potential contraindications to MRI;  Cervical radiculopathy     Technique:   -Sagittal T1, sagittal T2, sagittal STIR, and axial T2 weighted images  of the cervical spine were obtained without contrast.       Comparison: None.      Findings:     The typical cervical lordosis is slightly straightened.     No evidence of a diffuse marrow infiltrative process.  There are  endplate degenerative marrow changes, most significant at C6-C7     No cord signal abnormality.     No focal abnormalities of the craniocervical junction.     No focal abnormalities of the paraspinous soft tissues.     Cervical spine degenerative changes:     C2-C3: Minimal posterior disc osteophyte complex. Minimal central  canal narrowing. No significant neural foraminal narrowing. Mild facet  arthropathy.     C3-C4: Minimal posterior disc osteophyte complex. Minimal central  canal narrowing. Moderate to severe left and mild right neural  foraminal narrowing. Moderate facet arthropathy.     C4-C5: Small posterior disc osteophyte complex. Mild central canal  narrowing. Mild to moderate bilateral neural foraminal  narrowing.  Moderate facet arthropathy.     C5-C6: Minimal posterior disc osteophyte complex. Minimal central  canal narrowing. Moderate left and no significant right neural  foraminal narrowing. Moderate facet arthropathy.     C6-C7: Small posterior disc osteophyte complex. Mild central canal  narrowing. Moderate left and minimal right neural foraminal narrowing.  Moderate facet arthropathy.     C7-T1: Disc height is maintained. No significant central canal or  neural foraminal narrowing.                                                                      Impression:  Moderate to severe left neural foraminal narrowing at C3-C4. Moderate  left neural foraminal narrowing at C5-C6 and C6-C7.     Moderate multilevel facet arthropathy.     No high-grade central canal narrowing.     CLAUS MAGUIRE MD           11/22/2023     9:26 AM   Preop Questions   1. Have you ever had a heart attack or stroke? No   2. Have you ever had surgery on your heart or blood vessels, such as a stent placement, a coronary artery bypass, or surgery on an artery in your head, neck, heart, or legs? No   3. Do you have chest pain with activity? No   4. Do you have a history of  heart failure? No   5. Do you currently have a cold, bronchitis or symptoms of other infection? No   6. Do you have a cough, shortness of breath, or wheezing? No   7. Do you or anyone in your family have previous history of blood clots? No   8. Do you or does anyone in your family have a serious bleeding problem such as prolonged bleeding following surgeries or cuts? No   9. Have you ever had problems with anemia or been told to take iron pills? No   10. Have you had any abnormal blood loss such as black, tarry or bloody stools? No   11. Have you ever had a blood transfusion? No   12. Are you willing to have a blood transfusion if it is medically needed before, during, or after your surgery? Yes   13. Have you or any of your relatives ever had problems with anesthesia?  No   14. Do you have sleep apnea, excessive snoring or daytime drowsiness? No   15. Do you have any artifical heart valves or other implanted medical devices like a pacemaker, defibrillator, or continuous glucose monitor? No   16. Do you have artificial joints? No   17. Are you allergic to latex? No           Health Care Directive:  Patient does not have a Health Care Directive or Living Will: Discussed advance care planning with patient; however, patient declined at this time.        Status of Chronic Conditions:  See problem list for active medical problems.  Problems all longstanding and stable, except as noted/documented.  See ROS for pertinent symptoms related to these conditions.        Review of Systems  CONSTITUTIONAL: NEGATIVE for fever, chills, change in weight  INTEGUMENTARY/SKIN: NEGATIVE for worrisome rashes, moles or lesions  EYES: NEGATIVE for vision changes or irritation  ENT/MOUTH: NEGATIVE for ear, mouth and throat problems  RESP: NEGATIVE for significant cough or SOB  CV: NEGATIVE for chest pain, palpitations or peripheral edema  GI: NEGATIVE for nausea, abdominal pain, heartburn, or change in bowel habits  : NEGATIVE for frequency, dysuria, or hematuria  NEURO: NEGATIVE for weakness, dizziness or paresthesias  ENDOCRINE: NEGATIVE for temperature intolerance, skin/hair changes  HEME: NEGATIVE for bleeding problems  PSYCHIATRIC: NEGATIVE for changes in mood or affect        Patient Active Problem List    Diagnosis Date Noted    Pelvic floor dysfunction 05/12/2023     Priority: Medium    Bilateral low back pain with bilateral sciatica 07/18/2022     Priority: Medium    Acquired hypothyroidism 11/26/2021     Priority: Medium    Hashimoto's thyroiditis 08/28/2018     Priority: Medium    Reactive airway disease that is not asthma 02/12/2018     Priority: Medium     Replacing diagnoses that were inactivated after the 10/1/2021 regulatory import.      Vitamin D deficiency 10/24/2017     Priority:  Medium    ACP (advance care planning) 05/26/2016     Priority: Medium     Advance Care Planning 5/26/2016: ACP Review of Chart / Resources Provided:  Reviewed chart for advance care plan.  Kannan Ordonez has been provided information and resources to begin or update their advance care plan.  Added by ELVA IBARRA                  Past Medical History:   Diagnosis Date    Acquired hypothyroidism 11/26/2021    Anxiety 6/24/2016    Asthma     Hashimoto's thyroiditis 8/28/2018    Heart murmur 11/20/2017    Hyperlipidemia LDL goal <100 12/16/2015    Hypertension 3/3/2015    Hypothyroidism 5/1/2015    Reactive airway disease that is not asthma 2/12/2018    Recurrent major depressive disorder (H24) 6/24/2016    Vitamin D deficiency 10/24/2017         Past Surgical History:   Procedure Laterality Date    IR CONSULTATION FOR IR EXAM  12/8/2021    IR FLUORO 0-1 HOUR  12/20/2021    ORTHOPEDIC SURGERY  2015    r shoulder teat bone spur    ORTHOPEDIC SURGERY  2-2016    AC joint right    ORTHOPEDIC SURGERY  2016    Rt labrial tear    ORTHOPEDIC SURGERY  06/2017    revision decompression subpectoral biceps tenodesis          Current Outpatient Medications   Medication Sig Dispense Refill    levothyroxine (SYNTHROID/LEVOTHROID) 50 MCG tablet Take 1 tablet (50 mcg) by mouth daily 90 tablet 1    VYVANSE 70 MG capsule TAKE 1 CAPSULE BY MOUTH ONCE DAILY AS DIRECTED           Allergies   Allergen Reactions    Morphine Sulfate Rash    Cats Other (See Comments)     Other reaction(s): Eye Irritation, Sneezing    Dogs Other (See Comments)     Other reaction(s): Eye Irritation, Sneezing    Morphine Hives        Social History     Tobacco Use    Smoking status: Never    Smokeless tobacco: Current   Substance Use Topics    Alcohol use: Yes     Comment: occ       Family History   Problem Relation Age of Onset    Diabetes Mother     Hypertension Mother     Hyperlipidemia Mother     Coronary Artery Disease Father     Hyperlipidemia  Father     Hypertension Father     Thyroid Disease No family hx of          History   Drug Use No           Objective     /62 (BP Location: Left arm, Patient Position: Sitting, Cuff Size: Adult Large)   Pulse 91   Temp 97.9  F (36.6  C) (Tympanic)   Wt 105.5 kg (232 lb 8 oz)   SpO2 98%   BMI 29.84 kg/m          Physical Exam    GENERAL APPEARANCE: healthy, alert and no distress     EYES: EOMI,  PERRL     HENT: ear canals and TM's normal and nose and mouth without ulcers or lesions     NECK: no adenopathy, no asymmetry, masses, or scars and thyroid normal to palpation     RESP: lungs clear to auscultation - no rales, rhonchi or wheezes     CV: regular rates and rhythm, normal S1 S2, no S3 or S4 and no murmur, click or rub     ABDOMEN:  soft, nontender, no HSM or masses and bowel sounds normal     MS: Cervicalgia, headaches     SKIN: no suspicious lesions or rashes     NEURO: Normal strength and tone, sensory exam grossly normal, mentation intact and speech normal     PSYCH: mentation appears normal. and affect normal/bright     LYMPHATICS: No cervical adenopathy        Recent Labs   Lab Test 09/27/23  1851 05/09/23  0849   HGB 15.7 15.8    198    137   POTASSIUM 4.0 4.1   CR 1.12 1.20*          Diagnostics:  Recent Results (from the past 24 hour(s))   Extra Serum Separator Tube (SST)    Collection Time: 11/22/23  9:15 AM   Result Value Ref Range    Hold Specimen [facility]    Comprehensive metabolic panel    Collection Time: 11/22/23  9:38 AM   Result Value Ref Range    Sodium 140 135 - 145 mmol/L    Potassium 3.8 3.4 - 5.3 mmol/L    Carbon Dioxide (CO2) 24 22 - 29 mmol/L    Anion Gap 12 7 - 15 mmol/L    Urea Nitrogen 18.8 6.0 - 20.0 mg/dL    Creatinine 1.11 0.67 - 1.17 mg/dL    GFR Estimate 90 >60 mL/min/1.73m2    Calcium 9.5 8.6 - 10.0 mg/dL    Chloride 104 98 - 107 mmol/L    Glucose 90 70 - 99 mg/dL    Alkaline Phosphatase 103 40 - 150 U/L    AST 25 0 - 45 U/L    ALT 33 0 - 70 U/L    Protein Total  6.9 6.4 - 8.3 g/dL    Albumin 4.5 3.5 - 5.2 g/dL    Bilirubin Total 0.4 <=1.2 mg/dL   CBC with platelets and differential    Collection Time: 11/22/23  9:38 AM   Result Value Ref Range    WBC Count 7.0 4.0 - 11.0 10e3/uL    RBC Count 5.24 4.40 - 5.90 10e6/uL    Hemoglobin 16.0 13.3 - 17.7 g/dL    Hematocrit 47.4 40.0 - 53.0 %    MCV 91 78 - 100 fL    MCH 30.5 26.5 - 33.0 pg    MCHC 33.8 31.5 - 36.5 g/dL    RDW 11.5 10.0 - 15.0 %    Platelet Count 214 150 - 450 10e3/uL    % Neutrophils 71 %    % Lymphocytes 20 %    % Monocytes 7 %    % Eosinophils 1 %    % Basophils 0 %    % Immature Granulocytes 0 %    Absolute Neutrophils 5.0 1.6 - 8.3 10e3/uL    Absolute Lymphocytes 1.4 0.8 - 5.3 10e3/uL    Absolute Monocytes 0.5 0.0 - 1.3 10e3/uL    Absolute Eosinophils 0.1 0.0 - 0.7 10e3/uL    Absolute Basophils 0.0 0.0 - 0.2 10e3/uL    Absolute Immature Granulocytes 0.0 <=0.4 10e3/uL   PSA, screen    Collection Time: 11/22/23  9:38 AM   Result Value Ref Range    Prostate Specific Antigen Screen 0.32 ng/mL   TSH    Collection Time: 11/22/23  9:38 AM   Result Value Ref Range    TSH 3.96 0.30 - 4.20 uIU/mL   Lipid Profile (Chol, Trig, HDL, LDL calc)    Collection Time: 11/22/23  9:38 AM   Result Value Ref Range    Cholesterol 153 <200 mg/dL    Triglycerides 65 <150 mg/dL    Direct Measure HDL 48 >=40 mg/dL    LDL Cholesterol Calculated 92 <=100 mg/dL    Non HDL Cholesterol 105 <130 mg/dL            Revised Cardiac Risk Index (RCRI):  The patient has the following serious cardiovascular risks for perioperative complications:   - No serious cardiac risks = 0 points         RCRI Interpretation: 0 points: Class I (very low risk - 0.4% complication rate)      1. Pre-op exam  - Comprehensive metabolic panel  - CBC with platelets and differential    2. Cervicalgia  - See above       Signed Electronically by: Yeimy Corona CNP  Copy of this evaluation report is provided to requesting physician.

## 2023-11-22 ENCOUNTER — LAB (OUTPATIENT)
Dept: LAB | Facility: OTHER | Age: 33
End: 2023-11-22
Attending: NURSE PRACTITIONER
Payer: COMMERCIAL

## 2023-11-22 ENCOUNTER — OFFICE VISIT (OUTPATIENT)
Dept: FAMILY MEDICINE | Facility: OTHER | Age: 33
End: 2023-11-22
Attending: NURSE PRACTITIONER
Payer: COMMERCIAL

## 2023-11-22 VITALS
SYSTOLIC BLOOD PRESSURE: 128 MMHG | BODY MASS INDEX: 29.84 KG/M2 | WEIGHT: 232.5 LBS | HEART RATE: 91 BPM | TEMPERATURE: 97.9 F | OXYGEN SATURATION: 98 % | DIASTOLIC BLOOD PRESSURE: 62 MMHG

## 2023-11-22 DIAGNOSIS — Z01.818 PRE-OP EXAM: Primary | ICD-10-CM

## 2023-11-22 DIAGNOSIS — M54.2 CERVICALGIA: ICD-10-CM

## 2023-11-22 LAB
ALBUMIN SERPL BCG-MCNC: 4.5 G/DL (ref 3.5–5.2)
ALP SERPL-CCNC: 103 U/L (ref 40–150)
ALT SERPL W P-5'-P-CCNC: 33 U/L (ref 0–70)
ANION GAP SERPL CALCULATED.3IONS-SCNC: 12 MMOL/L (ref 7–15)
AST SERPL W P-5'-P-CCNC: 25 U/L (ref 0–45)
BASOPHILS # BLD AUTO: 0 10E3/UL (ref 0–0.2)
BASOPHILS NFR BLD AUTO: 0 %
BILIRUB SERPL-MCNC: 0.4 MG/DL
BUN SERPL-MCNC: 18.8 MG/DL (ref 6–20)
CALCIUM SERPL-MCNC: 9.5 MG/DL (ref 8.6–10)
CHLORIDE SERPL-SCNC: 104 MMOL/L (ref 98–107)
CHOLEST SERPL-MCNC: 153 MG/DL
CREAT SERPL-MCNC: 1.11 MG/DL (ref 0.67–1.17)
DEPRECATED HCO3 PLAS-SCNC: 24 MMOL/L (ref 22–29)
EGFRCR SERPLBLD CKD-EPI 2021: 90 ML/MIN/1.73M2
EOSINOPHIL # BLD AUTO: 0.1 10E3/UL (ref 0–0.7)
EOSINOPHIL NFR BLD AUTO: 1 %
ERYTHROCYTE [DISTWIDTH] IN BLOOD BY AUTOMATED COUNT: 11.5 % (ref 10–15)
GLUCOSE SERPL-MCNC: 90 MG/DL (ref 70–99)
HCT VFR BLD AUTO: 47.4 % (ref 40–53)
HDLC SERPL-MCNC: 48 MG/DL
HGB BLD-MCNC: 16 G/DL (ref 13.3–17.7)
HOLD SPECIMEN: NORMAL
IMM GRANULOCYTES # BLD: 0 10E3/UL
IMM GRANULOCYTES NFR BLD: 0 %
LDLC SERPL CALC-MCNC: 92 MG/DL
LYMPHOCYTES # BLD AUTO: 1.4 10E3/UL (ref 0.8–5.3)
LYMPHOCYTES NFR BLD AUTO: 20 %
MCH RBC QN AUTO: 30.5 PG (ref 26.5–33)
MCHC RBC AUTO-ENTMCNC: 33.8 G/DL (ref 31.5–36.5)
MCV RBC AUTO: 91 FL (ref 78–100)
MONOCYTES # BLD AUTO: 0.5 10E3/UL (ref 0–1.3)
MONOCYTES NFR BLD AUTO: 7 %
NEUTROPHILS # BLD AUTO: 5 10E3/UL (ref 1.6–8.3)
NEUTROPHILS NFR BLD AUTO: 71 %
NONHDLC SERPL-MCNC: 105 MG/DL
PLATELET # BLD AUTO: 214 10E3/UL (ref 150–450)
POTASSIUM SERPL-SCNC: 3.8 MMOL/L (ref 3.4–5.3)
PROT SERPL-MCNC: 6.9 G/DL (ref 6.4–8.3)
PSA SERPL DL<=0.01 NG/ML-MCNC: 0.32 NG/ML
RBC # BLD AUTO: 5.24 10E6/UL (ref 4.4–5.9)
SODIUM SERPL-SCNC: 140 MMOL/L (ref 135–145)
TRIGL SERPL-MCNC: 65 MG/DL
TSH SERPL DL<=0.005 MIU/L-ACNC: 3.96 UIU/ML (ref 0.3–4.2)
WBC # BLD AUTO: 7 10E3/UL (ref 4–11)

## 2023-11-22 PROCEDURE — G0103 PSA SCREENING: HCPCS | Performed by: NURSE PRACTITIONER

## 2023-11-22 PROCEDURE — 80050 GENERAL HEALTH PANEL: CPT | Performed by: NURSE PRACTITIONER

## 2023-11-22 PROCEDURE — 84443 ASSAY THYROID STIM HORMONE: CPT | Mod: XU | Performed by: NURSE PRACTITIONER

## 2023-11-22 PROCEDURE — 82670 ASSAY OF TOTAL ESTRADIOL: CPT | Performed by: NURSE PRACTITIONER

## 2023-11-22 PROCEDURE — 99213 OFFICE O/P EST LOW 20 MIN: CPT | Performed by: NURSE PRACTITIONER

## 2023-11-22 PROCEDURE — 36415 COLL VENOUS BLD VENIPUNCTURE: CPT | Performed by: NURSE PRACTITIONER

## 2023-11-22 PROCEDURE — 84403 ASSAY OF TOTAL TESTOSTERONE: CPT | Performed by: NURSE PRACTITIONER

## 2023-11-22 PROCEDURE — 80061 LIPID PANEL: CPT | Performed by: NURSE PRACTITIONER

## 2023-11-22 ASSESSMENT — PAIN SCALES - GENERAL: PAINLEVEL: MILD PAIN (2)

## 2023-11-22 NOTE — PATIENT INSTRUCTIONS
Preparing for Your Surgery  Getting started  A nurse will call you to review your health history and instructions. They will give you an arrival time based on your scheduled surgery time. Please be ready to share:  Your doctor's clinic name and phone number  Your medical, surgical, and anesthesia history  A list of allergies and sensitivities  A list of medicines, including herbal treatments and over-the-counter drugs  Whether the patient has a legal guardian (ask how to send us the papers in advance)  Please tell us if you're pregnant--or if there's any chance you might be pregnant. Some surgeries may injure a fetus (unborn baby), so they require a pregnancy test. Surgeries that are safe for a fetus don't always need a test, and you can choose whether to have one.   If you have a child who's having surgery, please ask for a copy of Preparing for Your Child's Surgery.    Preparing for surgery  Within 10 to 30 days of surgery: Have a pre-op exam (sometimes called an H&P, or History and Physical). This can be done at a clinic or pre-operative center.  If you're having a , you may not need this exam. Talk to your care team.  At your pre-op exam, talk to your care team about all medicines you take. If you need to stop any medicines before surgery, ask when to start taking them again.  We do this for your safety. Many medicines can make you bleed too much during surgery. Some change how well surgery (anesthesia) drugs work.  Call your insurance company to let them know you're having surgery. (If you don't have insurance, call [phone].)  Call your clinic if there's any change in your health. This includes signs of a cold or flu (sore throat, runny nose, cough, rash, fever). It also includes a scrape or scratch near the surgery site.  If you have questions on the day of surgery, call your hospital or surgery center.  Eating and drinking guidelines  For your safety: Unless your surgeon tells you otherwise,  follow the guidelines below.  Eat and drink as usual until 8 hours before you arrive for surgery. After that, no food or milk.  Drink clear liquids until 2 hours before you arrive. These are liquids you can see through, like water, Gatorade, and Propel Water. They also include plain black coffee and tea (no cream or milk), candy, and breath mints. You can spit out gum when you arrive.  If you drink alcohol: Stop drinking it the night before surgery.  If your care team tells you to take medicine on the morning of surgery, it's okay to take it with a sip of water.  Preventing infection  Shower or bathe the night before and morning of your surgery. Follow the instructions your clinic gave you. (If no instructions, use regular soap.)  Don't shave or clip hair near your surgery site. We'll remove the hair if needed.  Don't smoke or vape the morning of surgery. You may chew nicotine gum up to 2 hours before surgery. A nicotine patch is okay.  Note: Some surgeries require you to completely quit smoking and nicotine. Check with your surgeon.  Your care team will make every effort to keep you safe from infection. We will:  Clean our hands often with soap and water (or an alcohol-based hand rub).  Clean the skin at your surgery site with a special soap that kills germs.  Give you a special gown to keep you warm. (Cold raises the risk of infection.)  Wear special hair covers, masks, gowns and gloves during surgery.  Give antibiotic medicine, if prescribed. Not all surgeries need antibiotics.  What to bring on the day of surgery  Photo ID and insurance card  Copy of your health care directive, if you have one  Glasses and hearing aids (bring cases)  You can't wear contacts during surgery  Inhaler and eye drops, if you use them (tell us about these when you arrive)  CPAP machine or breathing device, if you use them  A few personal items, if spending the night  If you have . . .  A pacemaker, ICD (cardiac defibrillator) or other  implant: Bring the ID card.  An implanted stimulator: Bring the remote control.  A legal guardian: Bring a copy of the certified (court-stamped) guardianship papers.  Please remove any jewelry, including body piercings. Leave jewelry and other valuables at home.  If you're going home the day of surgery  You must have a responsible adult drive you home. They should stay with you overnight as well.  If you don't have someone to stay with you, and you aren't safe to go home alone, we may keep you overnight. Insurance often won't pay for this.  After surgery  If it's hard to control your pain or you need more pain medicine, please call your surgeon's office.  Questions?   If you have any questions for your care team, list them here: _________________________________________________________________________________________________________________________________________________________________________ ____________________________________ ____________________________________ ____________________________________  For informational purposes only. Not to replace the advice of your health care provider. Copyright   2003, 2019 [facility]. All rights reserved. Clinically reviewed by Tania Horton MD. [employer] [id number] - REV 12/22.

## 2023-11-23 LAB — ESTRADIOL SERPL-MCNC: 42 PG/ML

## 2023-11-26 LAB — TESTOST SERPL-MCNC: 629 NG/DL (ref 240–950)

## 2023-11-28 ENCOUNTER — TELEPHONE (OUTPATIENT)
Dept: FAMILY MEDICINE | Facility: OTHER | Age: 33
End: 2023-11-28

## 2023-12-12 DIAGNOSIS — E03.9 HYPOTHYROIDISM, UNSPECIFIED TYPE: ICD-10-CM

## 2023-12-12 NOTE — TELEPHONE ENCOUNTER
Synthroid      Last Written Prescription Date:  5/12/23  Last Fill Quantity: 90,   # refills: 1  Last Office Visit: 11/22/23  Future Office visit:       Routing refill request to provider for review/approval because:

## 2023-12-13 RX ORDER — LEVOTHYROXINE SODIUM 50 UG/1
50 TABLET ORAL DAILY
Qty: 90 TABLET | Refills: 1 | Status: SHIPPED | OUTPATIENT
Start: 2023-12-13 | End: 2024-06-28

## 2024-04-23 ENCOUNTER — LAB (OUTPATIENT)
Dept: LAB | Facility: OTHER | Age: 34
End: 2024-04-23
Payer: COMMERCIAL

## 2024-04-23 DIAGNOSIS — R68.89 MECHANICAL AND MOTOR PROBLEMS WITH INTERNAL ORGANS: ICD-10-CM

## 2024-04-23 DIAGNOSIS — Z00.01 ENCOUNTER FOR GENERAL ADULT MEDICAL EXAMINATION WITH ABNORMAL FINDINGS: ICD-10-CM

## 2024-04-23 DIAGNOSIS — R53.83 FATIGUE: Primary | ICD-10-CM

## 2024-04-23 LAB
ALBUMIN SERPL BCG-MCNC: 4.8 G/DL (ref 3.5–5.2)
ALP SERPL-CCNC: 87 U/L (ref 40–150)
ALT SERPL W P-5'-P-CCNC: 34 U/L (ref 0–70)
ANION GAP SERPL CALCULATED.3IONS-SCNC: 13 MMOL/L (ref 7–15)
AST SERPL W P-5'-P-CCNC: 26 U/L (ref 0–45)
BASOPHILS # BLD AUTO: 0 10E3/UL (ref 0–0.2)
BASOPHILS NFR BLD AUTO: 0 %
BILIRUB SERPL-MCNC: 0.4 MG/DL
BUN SERPL-MCNC: 21.5 MG/DL (ref 6–20)
CALCIUM SERPL-MCNC: 10.2 MG/DL (ref 8.6–10)
CHLORIDE SERPL-SCNC: 102 MMOL/L (ref 98–107)
CHOLEST SERPL-MCNC: 131 MG/DL
CREAT SERPL-MCNC: 1.18 MG/DL (ref 0.67–1.17)
DEPRECATED HCO3 PLAS-SCNC: 25 MMOL/L (ref 22–29)
EGFRCR SERPLBLD CKD-EPI 2021: 83 ML/MIN/1.73M2
EOSINOPHIL # BLD AUTO: 0.1 10E3/UL (ref 0–0.7)
EOSINOPHIL NFR BLD AUTO: 1 %
ERYTHROCYTE [DISTWIDTH] IN BLOOD BY AUTOMATED COUNT: 11.3 % (ref 10–15)
FASTING STATUS PATIENT QL REPORTED: NO
GLUCOSE SERPL-MCNC: 66 MG/DL (ref 70–99)
HCT VFR BLD AUTO: 50.8 %
HDLC SERPL-MCNC: 44 MG/DL
HGB BLD-MCNC: 16.8 G/DL
IMM GRANULOCYTES # BLD: 0 10E3/UL
IMM GRANULOCYTES NFR BLD: 0 %
LDLC SERPL CALC-MCNC: 56 MG/DL
LYMPHOCYTES # BLD AUTO: 2 10E3/UL (ref 0.8–5.3)
LYMPHOCYTES NFR BLD AUTO: 21 %
MCH RBC QN AUTO: 30 PG (ref 26.5–33)
MCHC RBC AUTO-ENTMCNC: 33.1 G/DL (ref 31.5–36.5)
MCV RBC AUTO: 91 FL (ref 78–100)
MONOCYTES # BLD AUTO: 0.6 10E3/UL (ref 0–1.3)
MONOCYTES NFR BLD AUTO: 6 %
NEUTROPHILS # BLD AUTO: 6.7 10E3/UL (ref 1.6–8.3)
NEUTROPHILS NFR BLD AUTO: 71 %
NONHDLC SERPL-MCNC: 87 MG/DL
PLATELET # BLD AUTO: 194 10E3/UL (ref 150–450)
POTASSIUM SERPL-SCNC: 3.9 MMOL/L (ref 3.4–5.3)
PROT SERPL-MCNC: 7.3 G/DL (ref 6.4–8.3)
PSA SERPL DL<=0.01 NG/ML-MCNC: 0.37 NG/ML
RBC # BLD AUTO: 5.6 10E6/UL
SODIUM SERPL-SCNC: 140 MMOL/L (ref 135–145)
TRIGL SERPL-MCNC: 155 MG/DL
TSH SERPL DL<=0.005 MIU/L-ACNC: 4.82 UIU/ML (ref 0.3–4.2)
WBC # BLD AUTO: 9.4 10E3/UL (ref 4–11)

## 2024-04-23 PROCEDURE — G0103 PSA SCREENING: HCPCS

## 2024-04-23 PROCEDURE — 80061 LIPID PANEL: CPT

## 2024-04-23 PROCEDURE — 82670 ASSAY OF TOTAL ESTRADIOL: CPT

## 2024-04-23 PROCEDURE — 80050 GENERAL HEALTH PANEL: CPT

## 2024-04-23 PROCEDURE — 36415 COLL VENOUS BLD VENIPUNCTURE: CPT

## 2024-04-23 PROCEDURE — 84403 ASSAY OF TOTAL TESTOSTERONE: CPT | Performed by: FAMILY MEDICINE

## 2024-04-25 LAB — ESTRADIOL SERPL-MCNC: 23 PG/ML

## 2024-04-26 LAB — TESTOST SERPL-MCNC: 687 NG/DL (ref 240–950)

## 2024-05-13 NOTE — PROGRESS NOTES
Assessment & Plan         Acquired hypothyroidism  - TSH with free T4 reflex; Future      Follow-up 6 months      [facility]  [phone]         Kannan is a 34 year old, presenting for the following health issues:  Thyroid Problem          Hypothyroidism Follow-up  Since last visit, patient describes the following symptoms: Weight stable, no hair loss, no skin changes, no constipation, no loose stools, anxiety, and fatigue        Patient Active Problem List   Diagnosis    ACP (advance care planning)    Vitamin D deficiency    Reactive airway disease that is not asthma    Hashimoto's thyroiditis    Acquired hypothyroidism    Bilateral low back pain with bilateral sciatica    Pelvic floor dysfunction     Past Surgical History:   Procedure Laterality Date    IR CONSULTATION FOR IR EXAM  12/8/2021    IR FLUORO 0-1 HOUR  12/20/2021    ORTHOPEDIC SURGERY  2015    r shoulder teat bone spur    ORTHOPEDIC SURGERY  2-2016    AC joint right    ORTHOPEDIC SURGERY  2016    Rt labrial tear    ORTHOPEDIC SURGERY  06/2017    revision decompression subpectoral biceps tenodesis        Social History     Tobacco Use    Smoking status: Never    Smokeless tobacco: Current   Substance Use Topics    Alcohol use: Yes     Comment: occ     Family History   Problem Relation Age of Onset    Diabetes Mother     Hypertension Mother     Hyperlipidemia Mother     Coronary Artery Disease Father     Hyperlipidemia Father     Hypertension Father     Thyroid Disease No family hx of              Current Outpatient Medications   Medication Sig Dispense Refill    levothyroxine (SYNTHROID/LEVOTHROID) 50 MCG tablet Take 1 tablet (50 mcg) by mouth daily 90 tablet 1    VYVANSE 70 MG capsule TAKE 1 CAPSULE BY MOUTH ONCE DAILY AS DIRECTED (Patient not taking: Reported on 5/14/2024)           Allergies   Allergen Reactions    Morphine Sulfate Rash    Cats Other (See Comments)     Other reaction(s): Eye Irritation, Sneezing    Dogs Other (See  Comments)     Other reaction(s): Eye Irritation, Sneezing    Morphine Hives         Recent Labs   Lab Test 04/23/24  1340 11/22/23  0938 09/27/23  1851 05/09/23  0849 06/29/21  1537 02/22/21  1433 11/17/17  0000 10/24/17  1439   A1C  --   --   --   --   --   --   --  5.1   LDL 56 92  --  73   < >  --   --   --    HDL 44 48  --  41   < >  --   --   --    TRIG 155* 65  --  50   < >  --   --   --    ALT 34 33 28 39   < > 69  --   --    CR 1.18* 1.11 1.12 1.20*   < > 1.06   < > 0.95   GFRESTIMATED 83 90 89 82   < > >90  --  >90   GFRESTBLACK  --   --   --   --   --  >90  --  >90   POTASSIUM 3.9 3.8 4.0 4.1   < > 3.9   < > 3.9   TSH 4.82* 3.96  --  4.83*   < > 7.80*   < > 1.94    < > = values in this interval not displayed.        BP Readings from Last 3 Encounters:   05/14/24 138/77   11/22/23 128/62   09/27/23 162/82    Wt Readings from Last 3 Encounters:   05/14/24 97.6 kg (215 lb 3.2 oz)   11/22/23 105.5 kg (232 lb 8 oz)   09/27/23 103.8 kg (228 lb 13.4 oz)              Review of Systems  Constitutional, HEENT, cardiovascular, pulmonary, gi and gu systems are negative, except as otherwise noted.          Objective    /77 (BP Location: Left arm, Patient Position: Sitting, Cuff Size: Adult Large)   Pulse 77   Temp 98  F (36.7  C) (Tympanic)   Resp 16   Wt 97.6 kg (215 lb 3.2 oz)   SpO2 98%   BMI 27.62 kg/m    Body mass index is 27.62 kg/m .        Physical Exam   GENERAL: alert and no distress  RESP: lungs clear to auscultation - no rales, rhonchi or wheezes  CV: regular rate and rhythm, normal S1 S2, no S3 or S4, no murmur, click or rub, no peripheral edema  SKIN: no suspicious lesions or rashes  PSYCH: mentation appears normal, affect normal/bright        Signed Electronically by: Yeimy Corona CNP

## 2024-05-14 ENCOUNTER — OFFICE VISIT (OUTPATIENT)
Dept: FAMILY MEDICINE | Facility: OTHER | Age: 34
End: 2024-05-14
Attending: NURSE PRACTITIONER
Payer: COMMERCIAL

## 2024-05-14 VITALS
RESPIRATION RATE: 16 BRPM | HEART RATE: 77 BPM | BODY MASS INDEX: 27.62 KG/M2 | SYSTOLIC BLOOD PRESSURE: 138 MMHG | TEMPERATURE: 98 F | WEIGHT: 215.2 LBS | DIASTOLIC BLOOD PRESSURE: 77 MMHG | OXYGEN SATURATION: 98 %

## 2024-05-14 DIAGNOSIS — J98.9 REACTIVE AIRWAY DISEASE WITHOUT ASTHMA: Primary | ICD-10-CM

## 2024-05-14 DIAGNOSIS — E03.9 ACQUIRED HYPOTHYROIDISM: Primary | ICD-10-CM

## 2024-05-14 LAB
T4 FREE SERPL-MCNC: 1.36 NG/DL (ref 0.9–1.7)
TSH SERPL DL<=0.005 MIU/L-ACNC: 5.56 UIU/ML (ref 0.3–4.2)

## 2024-05-14 PROCEDURE — 84439 ASSAY OF FREE THYROXINE: CPT | Performed by: NURSE PRACTITIONER

## 2024-05-14 PROCEDURE — 84443 ASSAY THYROID STIM HORMONE: CPT | Performed by: NURSE PRACTITIONER

## 2024-05-14 PROCEDURE — 36415 COLL VENOUS BLD VENIPUNCTURE: CPT | Performed by: NURSE PRACTITIONER

## 2024-05-14 PROCEDURE — 99213 OFFICE O/P EST LOW 20 MIN: CPT | Performed by: NURSE PRACTITIONER

## 2024-05-14 RX ORDER — ALBUTEROL SULFATE 90 UG/1
2 AEROSOL, METERED RESPIRATORY (INHALATION) EVERY 6 HOURS PRN
Qty: 18 G | Refills: 1 | Status: SHIPPED | OUTPATIENT
Start: 2024-05-14

## 2024-05-14 RX ORDER — ALBUTEROL SULFATE 0.83 MG/ML
2.5 SOLUTION RESPIRATORY (INHALATION) ONCE
Status: ACTIVE | OUTPATIENT
Start: 2024-05-14

## 2024-05-14 ASSESSMENT — PAIN SCALES - GENERAL: PAINLEVEL: NO PAIN (0)

## 2024-05-14 NOTE — PATIENT INSTRUCTIONS
Assessment & Plan         Acquired hypothyroidism  - TSH with free T4 reflex; Future        Follow-up 6 months        [facility]  [phone]

## 2024-05-31 ENCOUNTER — TELEPHONE (OUTPATIENT)
Dept: FAMILY MEDICINE | Facility: OTHER | Age: 34
End: 2024-05-31

## 2024-05-31 DIAGNOSIS — M62.89 PELVIC FLOOR DYSFUNCTION: Primary | ICD-10-CM

## 2024-05-31 DIAGNOSIS — R10.2 PELVIC PAIN IN MALE: ICD-10-CM

## 2024-06-18 NOTE — TELEPHONE ENCOUNTER
Spoke with patient , no suggestions from specialists in past. Unsure of where referral should go. Please advise

## 2024-06-19 NOTE — TELEPHONE ENCOUNTER
Please let him know I signed a referral to Orthopedics at [facility].    [facility]  [phone]

## 2024-06-28 DIAGNOSIS — E03.9 HYPOTHYROIDISM, UNSPECIFIED TYPE: ICD-10-CM

## 2024-06-28 RX ORDER — LEVOTHYROXINE SODIUM 50 UG/1
50 TABLET ORAL DAILY
Qty: 90 TABLET | Refills: 0 | Status: SHIPPED | OUTPATIENT
Start: 2024-06-28

## 2024-06-28 NOTE — TELEPHONE ENCOUNTER
Levothyroxine Sodium 50 MCG Oral Tablet         Last Written Prescription Date:  12/13/23  Last Fill Quantity: 90,   # refills: 1  Last Office Visit: 5/14/24  Future Office visit:       Routing refill request to provider for review/approval because:      Thyroid Protocol [id number]/28/2024 02:38 PM   Protocol Details Normal TSH on file in past 12 months          TSH   Date Value Ref Range Status   05/14/2024 5.56 (H) 0.30 - 4.20 uIU/mL Final   11/26/2021 4.64 (H) 0.40 - 4.00 mU/L Final   06/29/2021 6.08 (H) 0.40 - 4.00 mU/L Final

## 2024-07-03 ENCOUNTER — TELEPHONE (OUTPATIENT)
Dept: FAMILY MEDICINE | Facility: OTHER | Age: 34
End: 2024-07-03

## 2024-07-03 DIAGNOSIS — M54.41 BILATERAL LOW BACK PAIN WITH BILATERAL SCIATICA, UNSPECIFIED CHRONICITY: ICD-10-CM

## 2024-07-03 DIAGNOSIS — M62.89 PELVIC FLOOR DYSFUNCTION: Primary | ICD-10-CM

## 2024-07-03 DIAGNOSIS — R10.2 PELVIC PAIN IN MALE: ICD-10-CM

## 2024-07-03 DIAGNOSIS — M54.42 BILATERAL LOW BACK PAIN WITH BILATERAL SCIATICA, UNSPECIFIED CHRONICITY: ICD-10-CM

## 2024-07-03 NOTE — TELEPHONE ENCOUNTER
Patient would like to hold off on referral due to starting new job. Would like to see a sports physical therapist for hips and neck. Please advise.

## 2024-07-16 NOTE — TELEPHONE ENCOUNTER
Sydnie - what is the name of the PT clinic associated with [facility] [place]?    [facility]  [phone]

## 2024-07-23 ENCOUNTER — HOSPITAL ENCOUNTER (EMERGENCY)
Facility: HOSPITAL | Age: 34
Discharge: HOME | End: 2024-07-23
Attending: NURSE PRACTITIONER | Admitting: NURSE PRACTITIONER
Payer: COMMERCIAL

## 2024-07-23 VITALS
OXYGEN SATURATION: 96 % | HEART RATE: 72 BPM | DIASTOLIC BLOOD PRESSURE: 77 MMHG | RESPIRATION RATE: 21 BRPM | SYSTOLIC BLOOD PRESSURE: 159 MMHG | TEMPERATURE: 98.4 F

## 2024-07-23 DIAGNOSIS — G89.29 CHRONIC PELVIC PAIN IN MALE: ICD-10-CM

## 2024-07-23 DIAGNOSIS — R10.2 CHRONIC PELVIC PAIN IN MALE: ICD-10-CM

## 2024-07-23 PROCEDURE — 250N000013 HC RX MED GY IP 250 OP 250 PS 637: Performed by: NURSE PRACTITIONER

## 2024-07-23 PROCEDURE — 99283 EMERGENCY DEPT VISIT LOW MDM: CPT | Performed by: NURSE PRACTITIONER

## 2024-07-23 PROCEDURE — 99283 EMERGENCY DEPT VISIT LOW MDM: CPT

## 2024-07-23 RX ORDER — GABAPENTIN 300 MG/1
300 CAPSULE ORAL 2 TIMES DAILY
Qty: 20 CAPSULE | Refills: 0 | Status: SHIPPED | OUTPATIENT
Start: 2024-07-23

## 2024-07-23 RX ORDER — LORAZEPAM 1 MG/1
1 TABLET ORAL ONCE
Status: COMPLETED | OUTPATIENT
Start: 2024-07-23 | End: 2024-07-23

## 2024-07-23 RX ORDER — GABAPENTIN 300 MG/1
300 CAPSULE ORAL ONCE
Status: COMPLETED | OUTPATIENT
Start: 2024-07-23 | End: 2024-07-23

## 2024-07-23 RX ADMIN — GABAPENTIN 300 MG: 300 CAPSULE ORAL at 19:27

## 2024-07-23 RX ADMIN — LORAZEPAM 1 MG: 1 TABLET ORAL at 18:05

## 2024-07-23 ASSESSMENT — ACTIVITIES OF DAILY LIVING (ADL)
ADLS_ACUITY_SCORE: 35

## 2024-07-23 ASSESSMENT — COLUMBIA-SUICIDE SEVERITY RATING SCALE - C-SSRS
2. HAVE YOU ACTUALLY HAD ANY THOUGHTS OF KILLING YOURSELF IN THE PAST MONTH?: NO
6. HAVE YOU EVER DONE ANYTHING, STARTED TO DO ANYTHING, OR PREPARED TO DO ANYTHING TO END YOUR LIFE?: NO
1. IN THE PAST MONTH, HAVE YOU WISHED YOU WERE DEAD OR WISHED YOU COULD GO TO SLEEP AND NOT WAKE UP?: NO

## 2024-07-23 ASSESSMENT — ENCOUNTER SYMPTOMS
ALLERGIC/IMMUNOLOGIC NEGATIVE: 1
MUSCULOSKELETAL NEGATIVE: 1
CARDIOVASCULAR NEGATIVE: 1
HEMATOLOGIC/LYMPHATIC NEGATIVE: 1
ABDOMINAL PAIN: 1
NERVOUS/ANXIOUS: 1
EYES NEGATIVE: 1
NEUROLOGICAL NEGATIVE: 1
RESPIRATORY NEGATIVE: 1
CONSTITUTIONAL NEGATIVE: 1
ENDOCRINE NEGATIVE: 1

## 2024-07-23 NOTE — ED TRIAGE NOTES
Patient having anxiety. Tired form breathing through the pain/cramping spots. Not sure why, but has been dealing with this for 4 years

## 2024-07-23 NOTE — ED PROVIDER NOTES
History     Chief Complaint   Patient presents with    Anxiety     HPI  Kannan Ordonez is a 34 year old individual with history of reactive airway disease, Hashimoto's thyroiditis, pelvic floor dysfunction, comes in for right-sided body cramping.  Patient states that he has cramping in his pelvis which then goes into his stomach and entire right side of the body.   has been seen for this and imaged for this multiple times.  States that has been going on for the past 4 years.  States it is constant and he is getting sick of it so comes into the ER.  Denies any suicidal ideation.  Patient just very upset that this cannot be figured out.    Allergies:  Allergies   Allergen Reactions    Morphine Sulfate Rash    Cats Other (See Comments)     Other reaction(s): Eye Irritation, Sneezing    Dogs Other (See Comments)     Other reaction(s): Eye Irritation, Sneezing    Morphine Hives       Problem List:    Patient Active Problem List    Diagnosis Date Noted    Pelvic floor dysfunction 05/12/2023     Priority: Medium    Bilateral low back pain with bilateral sciatica 07/18/2022     Priority: Medium    Acquired hypothyroidism 11/26/2021     Priority: Medium    Hashimoto's thyroiditis 08/28/2018     Priority: Medium    Reactive airway disease that is not asthma 02/12/2018     Priority: Medium     Replacing diagnoses that were inactivated after the 10/1/2021 regulatory import.      Vitamin D deficiency 10/24/2017     Priority: Medium    ACP (advance care planning) 05/26/2016     Priority: Medium     Advance Care Planning 5/26/2016: ACP Review of Chart / Resources Provided:  Reviewed chart for advance care plan.  Kannan Ordonez has been provided information and resources to begin or update their advance care plan.  Added by ELVA IBARRA                Past Medical History:    Past Medical History:   Diagnosis Date    Acquired hypothyroidism 11/26/2021    Anxiety 6/24/2016    Asthma     Hashimoto's  thyroiditis 8/28/2018    Heart murmur 11/20/2017    Hyperlipidemia LDL goal <100 12/16/2015    Hypertension 3/3/2015    Hypothyroidism 5/1/2015    Reactive airway disease that is not asthma 2/12/2018    Recurrent major depressive disorder (H24) 6/24/2016    Vitamin D deficiency 10/24/2017       Past Surgical History:    Past Surgical History:   Procedure Laterality Date    IR CONSULTATION FOR IR EXAM  12/8/2021    IR FLUORO 0-1 HOUR  12/20/2021    ORTHOPEDIC SURGERY  2015    r shoulder teat bone spur    ORTHOPEDIC SURGERY  2-2016    AC joint right    ORTHOPEDIC SURGERY  2016    Rt labrial tear    ORTHOPEDIC SURGERY  06/2017    revision decompression subpectoral biceps tenodesis        Family History:    Family History   Problem Relation Age of Onset    Diabetes Mother     Hypertension Mother     Hyperlipidemia Mother     Coronary Artery Disease Father     Hyperlipidemia Father     Hypertension Father     Thyroid Disease No family hx of        Social History:  Marital Status:  Single [1]  Social History     Tobacco Use    Smoking status: Never    Smokeless tobacco: Current   Vaping Use    Vaping status: Never Used   Substance Use Topics    Alcohol use: Yes     Comment: occ    Drug use: No        Medications:    gabapentin (NEURONTIN) 300 MG capsule  levothyroxine (SYNTHROID/LEVOTHROID) 50 MCG tablet  albuterol (PROAIR HFA/PROVENTIL HFA/VENTOLIN HFA) 108 (90 Base) MCG/ACT inhaler          Review of Systems   Constitutional: Negative.    HENT: Negative.     Eyes: Negative.    Respiratory: Negative.     Cardiovascular: Negative.    Gastrointestinal:  Positive for abdominal pain (Right-sided).   Endocrine: Negative.    Genitourinary:  Positive for testicular pain (Right-sided).        Right-sided pelvic pain   Musculoskeletal: Negative.    Skin: Negative.    Allergic/Immunologic: Negative.    Neurological: Negative.    Hematological: Negative.    Psychiatric/Behavioral:  Negative for suicidal ideas. The patient is  nervous/anxious.        Physical Exam   BP: 179/83  Pulse: 72  Temp: 98.4  F (36.9  C)  Resp: 20  SpO2: 98 %      GENERAL APPEARANCE:  The patient is a 34 year old well-developed, well-nourished individual that appears as stated age.  NECK:  Supple.  Trachea is midline.    LUNGS:  Breathing is easy.  Breath sounds are equal and clear bilaterally.  No wheezes, rhonchi, or rales.  HEART:  Regular rate and rhythm with normal S1 and S2.  No murmurs, gallops, or rubs.  ABDOMEN:  Soft.  No mass, tenderness, guarding, or rebound.  No organomegaly or hernia.  Bowel sounds are present.  No CVA tenderness or flank mass.  No abdominal bruits or thrills present upon auscultation/palpation.  GENITOURINARY: No obvious anterior pelvic tenderness or hernia/mass.  NEUROLOGIC:  No focal sensory or motor deficits are noted.  G  PSYCHIATRIC:  The patient is awake, alert, and oriented x4.  Recent and remote memory is intact.  Anxious mood and affect.  Cooperative with history and physical exam.  SKIN:  Warm, dry, and well perfused.  Good turgor.  No lesions, nodules, or rashes are noted.  No bruising noted.  LYMPHATICS:  No supraclavicular, axillary, or groin adenopathy is noted.    Comment: Discrepancies between my note and notes on behalf of the nursing team or other care providers are secondary to my findings reflecting my physical examination and questioning of the patient.  Any conflicting information provided is not in line with my examination of the patient.       ED Course     ED Course as of 07/23/24 1934 Tue Jul 23, 2024 1713 In to see patient and history/physical completed.    1756 Lorazepam 1 mg sublingual ordered for anxiety.   1909 Gabapentin 300 mg p.o. ordered.   1931 Will discharge patient home on gabapentin 300 mg twice daily for anxiety to see if there is improvement.  Advised patient to do follow-up with psychiatry for possible help.  Return precautions given.          No results found for this or any previous  visit (from the past 24 hour(s)).    Medications   LORazepam (ATIVAN) tablet 1 mg (1 mg Sublingual $Given 7/23/24 1805)   gabapentin (NEURONTIN) capsule 300 mg (300 mg Oral $Given 7/23/24 1927)       Assessments & Plan (with Medical Decision Making)     I have reviewed the nursing notes.    I have reviewed the findings, diagnosis, plan and need for follow up with the patient.    Summary:  Patient presents to the ER today for right-sided body pain.  Potential diagnosis which have been considered and evaluated include psychological disorder, intra-abdominal abnormality, pelvic abnormality, nerve abnormality, as well as others. Many of these have been excluded using the various modalities and assessment as noted on the chart. At the present time, the diagnosis given seems to be the most likely right-sided body pain without etiology.  Upon arrival, vitals signs show blood pressure 179/83 with a pulse 72.  Temperature is 36.9  C.  Respirations 20 with oxygenation 98% on room air.  The patient is alert and oriented but very anxious.  Complains of right-sided abdominal/pelvic/body pain.  This has been longstanding for multiple years.  Did look through multiple records and patient has seen many specialties for this.  Has had multiple episodes of advanced imaging.  Has been on medications.  At this time it is unknown why patient is having his complaints.  No further workup will be conducted today as it is essentially has been done multiple times through the years with no change.  Did give patient lorazepam 1 mg sublingual for relaxation.  After long discussion, patient does admit that his cousin has the same issues and was given gabapentin and patient would like to try this.  For this reason gabapentin 300 mg p.o. was given.  Advised patient to try psychiatry for help.  Will have patient on gabapentin 300 mg twice daily to see if there is improvement.  Close follow-up with PCP recommended.  Patient verbalized understanding  this plan of care.  Patient discharged home.        Critical Care Time: None    Impression and plan discussed with patient. Questions answered, concerns addressed, indications for urgent re-evaluation reviewed, and  given. Patient/Parent/Caregiver agree with treatment plan and have no further questions at this time.  AVS provided at discharge.    This note was created by the Dragon Voice Dictation System. Inadvertent typographical errors, due to software recognition problems, may still exist.             New Prescriptions    GABAPENTIN (NEURONTIN) 300 MG CAPSULE    Take 1 capsule (300 mg) by mouth 2 times daily       Final diagnoses:   Chronic pelvic pain in male       7/23/2024   [facility]       Aftab Menchaca APRN CNP  07/23/24 1057

## 2024-07-24 NOTE — ED NOTES
Face to face report given with opportunity to observe patient.    Report given to PREETI Moeller RN   7/23/2024  7:02 PM

## 2024-07-24 NOTE — DISCHARGE INSTRUCTIONS
Try doing gabapentin 300 mg twice daily.  Contact your primary care provider to discuss referrals to psychiatry for possible treatment.         Follow-up with your primary care provider for reevaluation.  Contact your primary care provider if you have any questions or concerns.  Do not hesitate to return to the ER if any new or worsening symptoms.     Please read the attached instructions (if any).  They highlight more specific treatments and interventions for you at home.              Thank you for letting me participate in your care and wish you a fast and uneventful recovery,    Aftab HOOD, CNP    Do not hesitate to contact me with questions or concerns.  [email or web address]

## 2024-07-26 ENCOUNTER — TELEPHONE (OUTPATIENT)
Dept: FAMILY MEDICINE | Facility: OTHER | Age: 34
End: 2024-07-26

## 2024-07-26 NOTE — TELEPHONE ENCOUNTER
"Requesting overbook for Tuesday 7/30/24, would prefer not to wait until Wednesday. Please call [phone] if able to overbook.     Pt's significant other called to schedule follow-up. No pt information given out.     Seen in ER on 7/23/24.  Per Yolanda, pt still has anxiety denies any changes since ER visit. Discharge instructions to see PCP in 3 day(s). Yolanda stated, \"Krzysztof spoke to someone today from the clinic, they said Yeimy couldn't overbook anymore today\".     Patient significant other notified if needing immediate medical attention go back to ER/UC immediately, call 911 if needed. Yolanda verbalizes understanding and agrees to plan.    Heidi RN, [facility] Nurse     Future Appointments 7/26/2024 - 1/22/2025        Date Visit Type Length Department Provider     7/31/2024 11:30 AM ED/HOSP FOLLOW UP 30 min [facility] Yeimy Corona CNP    Location Instructions:     From [place] - follow [address]nbsp; Take a right at the intersection across from [employer].&nbsp; The clinic will be on your right.  From [place] - follow [address]nbsp; Take a right onto [address].&nbsp; Take a left at the intersection across from [employer].&nbsp; The clinic will be on your right.  From [place] - follow [address]nbsp; Take a left onto [address].&nbsp; Take a left at the intersection across from [employer].&nbsp; The clinic will be on your right.              11/15/2024 11:00 AM OFFICE VISIT 30 min [facility] Yeimy Corona CNP    Location Instructions:     From [place] - follow [address]nbsp; Take a right at the intersection across from [employer].&nbsp; The clinic will be on your right.  From [place] - follow [address]nbsp; Take a right onto [address]nbsp; Take a left at the intersection across from [employer].&nbsp; The clinic will be on your right.  From [place] - follow [address]nbsp; Take a left onto [address].&nbsp; Take a left at the intersection across " from [employer].&nbsp; The clinic will be on your right.

## 2024-07-26 NOTE — TELEPHONE ENCOUNTER
Symptom or reason needing to speak to RN: [facility] ER follow up  / Yamilet Erickson calling from patient     Best number to return call: [phone] this is the patient phone number    Best time to return call: Anytime

## 2024-07-31 ENCOUNTER — OFFICE VISIT (OUTPATIENT)
Dept: FAMILY MEDICINE | Facility: OTHER | Age: 34
End: 2024-07-31
Attending: NURSE PRACTITIONER
Payer: COMMERCIAL

## 2024-07-31 VITALS
DIASTOLIC BLOOD PRESSURE: 72 MMHG | SYSTOLIC BLOOD PRESSURE: 138 MMHG | HEART RATE: 62 BPM | WEIGHT: 229.2 LBS | BODY MASS INDEX: 29.41 KG/M2 | TEMPERATURE: 98.1 F | RESPIRATION RATE: 18 BRPM | OXYGEN SATURATION: 96 %

## 2024-07-31 DIAGNOSIS — R10.2 PELVIC PAIN IN MALE: ICD-10-CM

## 2024-07-31 DIAGNOSIS — M62.89 PELVIC FLOOR DYSFUNCTION: Primary | ICD-10-CM

## 2024-07-31 PROCEDURE — 99213 OFFICE O/P EST LOW 20 MIN: CPT | Performed by: NURSE PRACTITIONER

## 2024-07-31 ASSESSMENT — PATIENT HEALTH QUESTIONNAIRE - PHQ9
10. IF YOU CHECKED OFF ANY PROBLEMS, HOW DIFFICULT HAVE THESE PROBLEMS MADE IT FOR YOU TO DO YOUR WORK, TAKE CARE OF THINGS AT HOME, OR GET ALONG WITH OTHER PEOPLE: NOT DIFFICULT AT ALL
SUM OF ALL RESPONSES TO PHQ QUESTIONS 1-9: 0
SUM OF ALL RESPONSES TO PHQ QUESTIONS 1-9: 0

## 2024-07-31 ASSESSMENT — ANXIETY QUESTIONNAIRES
4. TROUBLE RELAXING: NEARLY EVERY DAY
2. NOT BEING ABLE TO STOP OR CONTROL WORRYING: NEARLY EVERY DAY
IF YOU CHECKED OFF ANY PROBLEMS ON THIS QUESTIONNAIRE, HOW DIFFICULT HAVE THESE PROBLEMS MADE IT FOR YOU TO DO YOUR WORK, TAKE CARE OF THINGS AT HOME, OR GET ALONG WITH OTHER PEOPLE: NOT DIFFICULT AT ALL
7. FEELING AFRAID AS IF SOMETHING AWFUL MIGHT HAPPEN: NOT AT ALL
GAD7 TOTAL SCORE: 18
5. BEING SO RESTLESS THAT IT IS HARD TO SIT STILL: NEARLY EVERY DAY
1. FEELING NERVOUS, ANXIOUS, OR ON EDGE: NEARLY EVERY DAY
7. FEELING AFRAID AS IF SOMETHING AWFUL MIGHT HAPPEN: NOT AT ALL
GAD7 TOTAL SCORE: 18
6. BECOMING EASILY ANNOYED OR IRRITABLE: NEARLY EVERY DAY
3. WORRYING TOO MUCH ABOUT DIFFERENT THINGS: NEARLY EVERY DAY
GAD7 TOTAL SCORE: 18
8. IF YOU CHECKED OFF ANY PROBLEMS, HOW DIFFICULT HAVE THESE MADE IT FOR YOU TO DO YOUR WORK, TAKE CARE OF THINGS AT HOME, OR GET ALONG WITH OTHER PEOPLE?: NOT DIFFICULT AT ALL

## 2024-07-31 ASSESSMENT — PAIN SCALES - GENERAL: PAINLEVEL: MODERATE PAIN (4)

## 2024-07-31 NOTE — PROGRESS NOTES
Assessment & Plan         Pelvic floor dysfunction  - Orthopedic  Referral; Future        Pelvic pain in male  - Orthopedic  Referral; Future        MED REC REQUIRED  Post Medication Reconciliation Status: discharge medications reconciled, continue medications without change        Yeimy Corona [facility]  [phone]           Kannan is a 34 year old, presenting for the following health issues:  ER F/U        ED/UC Followup:  Facility:  [facility]  Date of visit: 07/23/2024  Reason for visit: Pelvic pain  Current Status: no improvement, still having pain every day        Continues with pelvic floor dysfunction  Has seen ortho, has been through PT  Pain present daily  Lumbar, radiates to both legs  Right testicle pain  Radiates upward to ribs  Has seen urology  Has had his prostate checked  Pain with bowel movements        Summary:  Patient presents to the ER today for right-sided body pain.  Potential diagnosis which have been considered and evaluated include psychological disorder, intra-abdominal abnormality, pelvic abnormality, nerve abnormality, as well as others. Many of these have been excluded using the various modalities and assessment as noted on the chart. At the present time, the diagnosis given seems to be the most likely right-sided body pain without etiology.  Upon arrival, vitals signs show blood pressure 179/83 with a pulse 72.  Temperature is 36.9  C.  Respirations 20 with oxygenation 98% on room air.  The patient is alert and oriented but very anxious.  Complains of right-sided abdominal/pelvic/body pain.  This has been longstanding for multiple years.  Did look through multiple records and patient has seen many specialties for this.  Has had multiple episodes of advanced imaging.  Has been on medications.  At this time it is unknown why patient is having his complaints.  No further workup will be conducted today as it is essentially has been done multiple times through the  years with no change.  Did give patient lorazepam 1 mg sublingual for relaxation.  After long discussion, patient does admit that his cousin has the same issues and was given gabapentin and patient would like to try this.  For this reason gabapentin 300 mg p.o. was given.  Advised patient to try psychiatry for help.  Will have patient on gabapentin 300 mg twice daily to see if there is improvement.  Close follow-up with PCP recommended.  Patient verbalized understanding this plan of care.  Patient discharged home.      Aftab Menchaca APRN CNP  07/23/24 1934        MR LUMBAR SPINE W/O CONTRAST     HISTORY: 33 years Male Lumbar pain, sciatica; Low back pain;  Neurologic deficit, non-traumatic; LE numbness/paresthesia;  Increasing/persistent LE numbness/paresthesia; No known/automatically  detected potential contraindications to imaging; Acute bilateral low  back pain with bilateral sciatica; Acute bilateral low back pain with  bilateral sciatica     COMPARISONS: 12/7/2021     TECHNIQUE: Sagittal T1, sagittal T2, sagittal STIR, axial proton  density, axial T2 imaging of the lumbar spine was performed.     FINDINGS: Alignment of the lumbar spine is normal .  there are  Schmorl's type endplate changes at L1, L2 and L3 unchanged from the  prior study. Bone marrow signal throughout the lumbar spine is  otherwise normal. Vertebral body height is normal.Signal intensity and  caliber of the lower thoracic spinal cord is normal. The conus  medullaris is at the T12-L1 level.     L5-S1: There is moderate loss of disc height and signal. There is a  mild posterior broad-based disc bulge. The central canal and  neuroforamen are patent. There is no significant interval change.     L4-L5: There is normal disc space height and signal. The central  spinal canal and neural foramen are patent.     L3-L4: There is normal disc space height and signal. The central  spinal canal and neural foramen are patent.     L2-L3: There is loss of  disc height and signal. The central canal and  neuroforamen are widely patent.     L1-L2: Loss of disc signal with minimal loss of disc height. The  central canal and neuroforamen are patent                                                                      IMPRESSION: Mild degenerative disc disease of the lumbar spine. No  significant interval change. No evidence of disc herniation or focal  nerve root impingement.     ERROL RAYGOZA MD           MR PELVIC BONES WO CONTRAST     HISTORY: 31 years Male Bilateral hip pain, Pain in right testicle,     Buttock pain; Bilateral hip pain; Bilateral hip pain; Pain in right  testicle; Buttock pain     COMPARISON: None     TECHNIQUE: Multiplanar multisequence MR imaging of the pelvis was  performed.     FINDINGS: There is increased T2 signal within the deep aspect of the  right gluteus minimus muscle adjacent to its attachment on the right  iliac wing. See series 7 images 6 through 11. No soft tissue signal  abnormality about the pelvis or hips is otherwise present. No fluid  collections are seen.     The right inguinal canal is unremarkable. No abnormal fluid  collections are seen. There is no evidence of hernia or mass.     The sacroiliac joints pubic symphysis and hip joints are congruent.  Bone marrow signal of the sacrum and pelvis and proximal femurs is  normal. There is no evidence of edema.     There is no significant hip arthritic change.                                                                      IMPRESSION: Intramuscular edema along the origin of the right gluteus  minimus, adjacent to the right ilium. Question muscular strain,  myotendinous injury. The exam is otherwise unremarkable.     ERROL RAYGOZA MD \        Narrative & Impression   Exam:CT ABDOMEN PELVIS W CONTRAST     History: 33 years Male Periumbilical pain and right testicular pain.   Significantly worse after weight lifting     Comparisons:     Technique: Axial CT imaging of the  abdomen and pelvis was performed  with contrast. Coronal and sagittal reconstructions were obtained.   This exam was performed using one or more of the following dose  reduction techniques:  Automated exposure control, adjustment of the MAC and/or KV according  to patient's size, and/or use of iterative reconstruction technique.        FINDINGS:  Lung bases:The lung bases are clear     Abdomen:  Liver:Unremarkable  Gallbladder and biliary tree:No calcified gallstones are present.  There is no evidence of biliary dilatation.  Pancreas:Unremarkable  Spleen:Unremarkable  Adrenals:Normal     Kidneys and ureters:Unremarkable     Lymph nodes:There is no significant lymphadenopathy     Bowel:No abnormally distended or thickened loops of bowel are present.  There is no evidence of bowel obstruction.     Appendix: Not visualized.     Vessels:Unremarkable     Osseous structures:Unremarkable     Pelvis:There is no evidence of mass or lymphadenopathy. No abnormal  fluid collections are present.  The sacroiliac joints and pubic symphysis are congruent. Both hip  joints are congruent. There is no evidence of pelvic fracture.     No soft tissue abnormalities are evident. There is no evidence of  hematoma.                                                                        IMPRESSION: No acute changes.     ERROL RAYGOZA MD            Patient Active Problem List   Diagnosis    ACP (advance care planning)    Vitamin D deficiency    Reactive airway disease that is not asthma    Hashimoto's thyroiditis    Acquired hypothyroidism    Bilateral low back pain with bilateral sciatica    Pelvic floor dysfunction     Past Surgical History:   Procedure Laterality Date    IR CONSULTATION FOR IR EXAM  12/8/2021    IR FLUORO 0-1 HOUR  12/20/2021    ORTHOPEDIC SURGERY  2015    r shoulder teat bone spur    ORTHOPEDIC SURGERY  2-2016    AC joint right    ORTHOPEDIC SURGERY  2016    Rt labrial tear    ORTHOPEDIC SURGERY  06/2017    revision  decompression subpectoral biceps tenodesis        Social History     Tobacco Use    Smoking status: Never    Smokeless tobacco: Current   Substance Use Topics    Alcohol use: Yes     Comment: occ     Family History   Problem Relation Age of Onset    Diabetes Mother     Hypertension Mother     Hyperlipidemia Mother     Coronary Artery Disease Father     Hyperlipidemia Father     Hypertension Father     Thyroid Disease No family hx of              Current Outpatient Medications   Medication Sig Dispense Refill    albuterol (PROAIR HFA/PROVENTIL HFA/VENTOLIN HFA) 108 (90 Base) MCG/ACT inhaler Inhale 2 puffs into the lungs every 6 hours as needed for shortness of breath, wheezing or cough 18 g 1    gabapentin (NEURONTIN) 300 MG capsule Take 1 capsule (300 mg) by mouth 2 times daily 20 capsule 0    levothyroxine (SYNTHROID/LEVOTHROID) 50 MCG tablet Take 1 tablet by mouth once daily 90 tablet 0         Allergies   Allergen Reactions    Morphine Sulfate Rash    Cats Other (See Comments)     Other reaction(s): Eye Irritation, Sneezing    Dogs Other (See Comments)     Other reaction(s): Eye Irritation, Sneezing    Morphine Hives         Recent Labs   Lab Test 05/14/24  1519 04/23/24  1340 11/22/23  0938 09/27/23  1851 05/09/23  0849 06/29/21  1537 02/22/21  1433 11/17/17  0000 10/24/17  1439   A1C  --   --   --   --   --   --   --   --  5.1   LDL  --  56 92  --  73   < >  --   --   --    HDL  --  44 48  --  41   < >  --   --   --    TRIG  --  155* 65  --  50   < >  --   --   --    ALT  --  34 33 28 39   < > 69  --   --    CR  --  1.18* 1.11 1.12 1.20*   < > 1.06   < > 0.95   GFRESTIMATED  --  83 90 89 82   < > >90  --  >90   GFRESTBLACK  --   --   --   --   --   --  >90  --  >90   POTASSIUM  --  3.9 3.8 4.0 4.1   < > 3.9   < > 3.9   TSH 5.56* 4.82* 3.96  --  4.83*   < > 7.80*   < > 1.94    < > = values in this interval not displayed.          BP Readings from Last 3 Encounters:   07/31/24 138/72   07/23/24 159/77   05/14/24  138/77    Wt Readings from Last 3 Encounters:   07/31/24 104 kg (229 lb 3.2 oz)   05/14/24 97.6 kg (215 lb 3.2 oz)   11/22/23 105.5 kg (232 lb 8 oz)                   Review of Systems  Constitutional, HEENT, cardiovascular, pulmonary, GI, , musculoskeletal, neuro, skin, endocrine and psych systems are negative, except as otherwise noted.            Objective    /72 (BP Location: Right arm, Patient Position: Sitting, Cuff Size: Adult Large)   Pulse 62   Temp 98.1  F (36.7  C) (Tympanic)   Resp 18   Wt 104 kg (229 lb 3.2 oz)   SpO2 96%   BMI 29.41 kg/m    Body mass index is 29.41 kg/m .          Physical Exam   GENERAL: alert and no distress  EYES: Eyes grossly normal to inspection, PERRL and conjunctivae and sclerae normal  HENT: ear canals and TM's normal, nose and mouth without ulcers or lesions  NECK: no adenopathy, no asymmetry, masses, or scars  RESP: lungs clear to auscultation - no rales, rhonchi or wheezes  CV: regular rate and rhythm, normal S1 S2, no S3 or S4, no murmur, click or rub, no peripheral edema  MS: pelvic pain - anterior, posterior - bilateral groin pain, lateral hip pain  SKIN: no suspicious lesions or rashes  PSYCH: anxious          The longitudinal plan of care for the diagnosis(es)/condition(s) as documented were addressed during this visit. Due to the added complexity in care, I will continue to support [place] in the subsequent management and with ongoing continuity of care.         Signed Electronically by: Yeimy Corona CNP

## 2024-07-31 NOTE — PATIENT INSTRUCTIONS
Assessment & Plan         Pelvic floor dysfunction  - Orthopedic  Referral; Future      Pelvic pain in male  - Orthopedic  Referral; Future          Yeimy Corona [facility]  [phone]

## 2024-07-31 NOTE — LETTER
July 31, 2024      Kannan Ordonez  [address]  [facility] [address]        To Whom It May Concern:    Kannan Ordonez  was seen on 7/31/24.  Please excuse him  until 8/1/24 due to medical appointment..        Sincerely,        Yeimy Corona, CNP

## 2024-08-13 ENCOUNTER — TRANSCRIBE ORDERS (OUTPATIENT)
Dept: OTHER | Age: 34
End: 2024-08-13

## 2024-08-13 DIAGNOSIS — R10.2 PELVIC PAIN IN MALE: ICD-10-CM

## 2024-08-13 DIAGNOSIS — M62.89 PELVIC FLOOR DYSFUNCTION: Primary | ICD-10-CM

## 2024-08-14 NOTE — PROGRESS NOTES
Subjective Finding:    Chief compalint: Patient presents with:  Neck Pain  Back Pain  , Pain Scale: 4/10, Intensity: sharp, Duration: 2 months, Radiating: no.    Date of injury:     Activities that the pain restricts:   Home/household/hobbies/social activities: yes.  Work duties: yes.  Sleep: yes.  Makes symptoms better: rest.  Makes symptoms worse: activity.  Have you seen anyone else for the symptoms? No.  Work related: no.  Automobile related injury: no.    Objective and Assessment:    Posture Analysis:   High shoulder: .  Head tilt: .  High iliac crest: .  Head carriage: neutral.  Thoracic Kyphosis: neutral.  Lumbar Lordosis: neutral.    Lumbar Range of Motion: .  Cervical Range of Motion: extension decreased, left lateral flexion decreased and right lateral flexion decreased.  Thoracic Range of Motion: .  Extremity Range of Motion: .    Palpation:   Traps: sharp pain, referred pain: no    Segmental dysfunction pre-treatment and treatment area: C5, C6, C7 and T4.  L5    Assessment post-treatment:  Cervical: ROM increased.  Thoracic: ROM increased.  Lumbar: .    Comments: .      Complicating Factors: .    Procedure(s):  CMT:  81951 Chiropractic manipulative treatment 1-2 regions performed   Cervical: Diversified, See above for level, Supine and Thoracic: Diversified, See above for level, Prone    Modalities:  None performed this visit    Therapeutic procedures:  None    Plan:  Treatment plan: PRN.  Instructed patient: stretch as instructed at visit.  Short term goals: reduce pain.  Long term goals: increase ADL.  Prognosis: excellent.              This document is complete and the patient is ready for discharge.

## 2024-11-08 ENCOUNTER — LAB (OUTPATIENT)
Dept: LAB | Facility: OTHER | Age: 34
End: 2024-11-08
Payer: COMMERCIAL

## 2024-11-08 DIAGNOSIS — Z13.228 SCREENING FOR PHENYLKETONURIA (PKU): ICD-10-CM

## 2024-11-08 DIAGNOSIS — E75.6: ICD-10-CM

## 2024-11-08 DIAGNOSIS — R53.83 FATIGUE: ICD-10-CM

## 2024-11-08 DIAGNOSIS — Z00.01 ABNORMAL PHYSICAL EVALUATION: ICD-10-CM

## 2024-11-08 DIAGNOSIS — R68.89 MECHANICAL AND MOTOR PROBLEMS WITH INTERNAL ORGANS: Primary | ICD-10-CM

## 2024-11-08 DIAGNOSIS — Z79.818 PROPHYLACTIC USE OF AGENTS AFFECTING ESTROGEN RECEPTORS OR LEVELS: ICD-10-CM

## 2024-11-08 LAB
ALBUMIN SERPL BCG-MCNC: 4.6 G/DL (ref 3.5–5.2)
ALP SERPL-CCNC: 96 U/L (ref 40–150)
ALT SERPL W P-5'-P-CCNC: 38 U/L (ref 0–70)
ANION GAP SERPL CALCULATED.3IONS-SCNC: 13 MMOL/L (ref 7–15)
AST SERPL W P-5'-P-CCNC: 21 U/L (ref 0–45)
BASOPHILS # BLD AUTO: 0 10E3/UL (ref 0–0.2)
BASOPHILS NFR BLD AUTO: 0 %
BILIRUB SERPL-MCNC: 0.5 MG/DL
BUN SERPL-MCNC: 19.7 MG/DL (ref 6–20)
CALCIUM SERPL-MCNC: 9.5 MG/DL (ref 8.8–10.4)
CHLORIDE SERPL-SCNC: 102 MMOL/L (ref 98–107)
CHOLEST SERPL-MCNC: 188 MG/DL
CREAT SERPL-MCNC: 1.17 MG/DL (ref 0.67–1.17)
EGFRCR SERPLBLD CKD-EPI 2021: 84 ML/MIN/1.73M2
EOSINOPHIL # BLD AUTO: 0.2 10E3/UL (ref 0–0.7)
EOSINOPHIL NFR BLD AUTO: 2 %
ERYTHROCYTE [DISTWIDTH] IN BLOOD BY AUTOMATED COUNT: 11.2 % (ref 10–15)
ESTRADIOL SERPL-MCNC: 7 PG/ML
FASTING STATUS PATIENT QL REPORTED: YES
FASTING STATUS PATIENT QL REPORTED: YES
GLUCOSE SERPL-MCNC: 109 MG/DL (ref 70–99)
HCO3 SERPL-SCNC: 24 MMOL/L (ref 22–29)
HCT VFR BLD AUTO: 49.5 % (ref 40–53)
HDLC SERPL-MCNC: 43 MG/DL
HGB BLD-MCNC: 17.1 G/DL (ref 13.3–17.7)
IMM GRANULOCYTES # BLD: 0 10E3/UL
IMM GRANULOCYTES NFR BLD: 0 %
LDLC SERPL CALC-MCNC: 132 MG/DL
LYMPHOCYTES # BLD AUTO: 2 10E3/UL (ref 0.8–5.3)
LYMPHOCYTES NFR BLD AUTO: 23 %
MCH RBC QN AUTO: 31.3 PG (ref 26.5–33)
MCHC RBC AUTO-ENTMCNC: 34.5 G/DL (ref 31.5–36.5)
MCV RBC AUTO: 91 FL (ref 78–100)
MONOCYTES # BLD AUTO: 0.4 10E3/UL (ref 0–1.3)
MONOCYTES NFR BLD AUTO: 5 %
NEUTROPHILS # BLD AUTO: 6 10E3/UL (ref 1.6–8.3)
NEUTROPHILS NFR BLD AUTO: 69 %
NONHDLC SERPL-MCNC: 145 MG/DL
PLATELET # BLD AUTO: 201 10E3/UL (ref 150–450)
POTASSIUM SERPL-SCNC: 4.3 MMOL/L (ref 3.4–5.3)
PROT SERPL-MCNC: 7 G/DL (ref 6.4–8.3)
PSA SERPL DL<=0.01 NG/ML-MCNC: 0.3 NG/ML
RBC # BLD AUTO: 5.47 10E6/UL (ref 4.4–5.9)
SODIUM SERPL-SCNC: 139 MMOL/L (ref 135–145)
TRIGL SERPL-MCNC: 65 MG/DL
TSH SERPL DL<=0.005 MIU/L-ACNC: 9.64 UIU/ML (ref 0.3–4.2)
WBC # BLD AUTO: 8.7 10E3/UL (ref 4–11)

## 2024-11-08 PROCEDURE — 84403 ASSAY OF TOTAL TESTOSTERONE: CPT | Performed by: FAMILY MEDICINE

## 2024-11-08 PROCEDURE — 36415 COLL VENOUS BLD VENIPUNCTURE: CPT | Performed by: FAMILY MEDICINE

## 2024-11-08 PROCEDURE — 80061 LIPID PANEL: CPT | Performed by: FAMILY MEDICINE

## 2024-11-08 PROCEDURE — G0103 PSA SCREENING: HCPCS | Performed by: FAMILY MEDICINE

## 2024-11-08 PROCEDURE — 80050 GENERAL HEALTH PANEL: CPT | Performed by: FAMILY MEDICINE

## 2024-11-08 PROCEDURE — 82670 ASSAY OF TOTAL ESTRADIOL: CPT | Performed by: FAMILY MEDICINE

## 2024-11-11 ENCOUNTER — TELEPHONE (OUTPATIENT)
Dept: FAMILY MEDICINE | Facility: OTHER | Age: 34
End: 2024-11-11

## 2024-11-11 DIAGNOSIS — E03.9 HYPOTHYROIDISM, UNSPECIFIED TYPE: ICD-10-CM

## 2024-11-11 DIAGNOSIS — M62.89 PELVIC FLOOR DYSFUNCTION: Primary | ICD-10-CM

## 2024-11-11 NOTE — TELEPHONE ENCOUNTER
Signed  For pelvic floor dysfunction I assume      Yeimy Corona [facility]  [phone]

## 2024-11-12 LAB — TESTOST SERPL-MCNC: 994 NG/DL (ref 240–950)

## 2024-11-12 RX ORDER — LEVOTHYROXINE SODIUM 50 UG/1
50 TABLET ORAL DAILY
Qty: 90 TABLET | Refills: 0 | Status: SHIPPED | OUTPATIENT
Start: 2024-11-12

## 2024-11-12 NOTE — TELEPHONE ENCOUNTER
Routing refill request to provider for review/approval because:  Thyroid Protocol [id number]/11/2024 12:38 PM   Protocol Details   Normal TSH on file in past 12 months     TSH   Date Value Ref Range Status   11/08/2024 9.64 (H) 0.30 - 4.20 uIU/mL Final   11/26/2021 4.64 (H) 0.40 - 4.00 mU/L Final   06/29/2021 6.08 (H) 0.40 - 4.00 mU/L Final

## 2024-11-12 NOTE — TELEPHONE ENCOUNTER
Synthroid      Last Written Prescription Date:  06/28/24  Last Fill Quantity: 90,   # refills: 0  Last Office Visit: 07/31/24  Future Office visit:    Next 5 appointments (look out 90 days)      Nov 15, 2024 11:00 AM  (Arrive by 10:45 AM)  Provider Visit with Yeimy Corona CNP  [facility] ([facility] ) [address]  [place] [address]  [phone]

## 2024-12-12 ENCOUNTER — TRANSFERRED RECORDS (OUTPATIENT)
Dept: HEALTH INFORMATION MANAGEMENT | Facility: CLINIC | Age: 34
End: 2024-12-12

## 2025-01-20 NOTE — PROGRESS NOTES
Assessment & Plan     Acquired hypothyroidism  TSH persistently elevated, okay to increase dose, recheck 2 mo  - levothyroxine (SYNTHROID/LEVOTHROID) 88 MCG tablet  Dispense: 90 tablet; Refill: 3  - TSH with free T4 reflex  - levothyroxine (SYNTHROID/LEVOTHROID) 88 MCG tablet  Dispense: 90 tablet; Refill: 3    Bilateral low back pain with bilateral sciatica, unspecified chronicity / Pelvic floor dysfunction  Chronic back and neck pain from work accident, pudendal neuralgia working with PT on this. Stable    Low testosterone in male  Unclear history, he is currently on testosterone injections 4 times a week. Will get records and clarify dose from [facility]. I do see at least one low T back in 2023. Has seen urology for the pelvic pain, but not for the testosterone. If true hypogonadism, would recommend urology involved. Note thyroid dysfunction with slightly atypical labs also, however, CT head 2023 wnl. Will discuss with Urology and see if they would be willing to see.     No follow-ups on file.    Mercedes Brunner is a 34 year old, presenting for the following health issues:  [facility]        1/21/2025     8:08 AM   Additional Questions   Roomed by Herman Gerardo lpn   Accompanied by self     History of Present Illness       Hypothyroidism:     Since last visit, patient describes the following symptoms::  Anxiety    He eats 0-1 servings of fruits and vegetables daily.He consumes 0 sweetened beverage(s) daily.He exercises with enough effort to increase his heart rate 60 or more minutes per day.  He exercises with enough effort to increase his heart rate 5 days per week.   He is taking medications regularly.       Hypothyroidism Follow-up    Since last visit, patient describes the following symptoms: weight gain of 12 lbs and anxiety    Chronic/Recurring Back Pain Follow Up    Where is your back pain located? (Select all that apply) low back bilateral, middle of back bilateral, upper  back bilateral, neck bilateral, shoulders bilateral, gluteus bilateral, hip bilateral, and waist bilateral  How would you describe your back pain?  cramping and spasms burning  Where does your back pain spread? the right and left buttock, the right and left  thigh, the right and left  knee, and the right and left foot  Since your last clinic visit for back pain, how has your pain changed? always present, but gets better and worse  Does your back pain interfere with your job? No  Since your last visit, have you tried any new treatment? Yes -  benjamin seems to help hips      Review of Systems  Constitutional, neuro, ENT, endocrine, pulmonary, cardiac, gastrointestinal, genitourinary, musculoskeletal, integument and psychiatric systems are negative, except as otherwise noted.      Objective    BP (!) 140/60 (BP Location: Left arm, Patient Position: Sitting, Cuff Size: Adult Large)   Pulse 53   Temp 96.9  F (36.1  C) (Tympanic)   Resp 16   Wt 109.6 kg (241 lb 11.2 oz)   SpO2 98%   BMI 31.02 kg/m    Body mass index is 31.02 kg/m .    Physical Exam   GENERAL: alert and no distress  EYES: Eyes grossly normal to inspection  NECK: no adenopathy, no asymmetry, masses, or scars  RESP: lungs clear to auscultation - no rales, rhonchi or wheezes  CV: regular rates and rhythm, normal S1 S2, no S3 or S4, no murmur, click or rub, and no peripheral edema  MS: no gross musculoskeletal defects noted, no edema  NEURO: Normal strength and tone, mentation intact and speech normal  PSYCH: mentation appears normal, affect normal/bright, judgement and insight intact, and appearance well groomed    No results found for any visits on 01/21/25.        Signed Electronically by: Rea Kenny MD    The longitudinal plan of care for the diagnosis(es)/condition(s) as documented were addressed during this visit. Due to the added complexity in care, I will continue to support Kannan in the subsequent management and with ongoing continuity  of care.

## 2025-01-21 ENCOUNTER — OFFICE VISIT (OUTPATIENT)
Dept: FAMILY MEDICINE | Facility: OTHER | Age: 35
End: 2025-01-21
Attending: FAMILY MEDICINE
Payer: COMMERCIAL

## 2025-01-21 VITALS
BODY MASS INDEX: 31.02 KG/M2 | DIASTOLIC BLOOD PRESSURE: 60 MMHG | TEMPERATURE: 96.9 F | RESPIRATION RATE: 16 BRPM | SYSTOLIC BLOOD PRESSURE: 140 MMHG | HEART RATE: 53 BPM | OXYGEN SATURATION: 98 % | WEIGHT: 241.7 LBS

## 2025-01-21 DIAGNOSIS — M62.89 PELVIC FLOOR DYSFUNCTION: ICD-10-CM

## 2025-01-21 DIAGNOSIS — M54.42 BILATERAL LOW BACK PAIN WITH BILATERAL SCIATICA, UNSPECIFIED CHRONICITY: ICD-10-CM

## 2025-01-21 DIAGNOSIS — R79.89 LOW TESTOSTERONE IN MALE: ICD-10-CM

## 2025-01-21 DIAGNOSIS — E03.9 HYPOTHYROIDISM, UNSPECIFIED TYPE: ICD-10-CM

## 2025-01-21 DIAGNOSIS — E03.9 ACQUIRED HYPOTHYROIDISM: Primary | ICD-10-CM

## 2025-01-21 DIAGNOSIS — M54.41 BILATERAL LOW BACK PAIN WITH BILATERAL SCIATICA, UNSPECIFIED CHRONICITY: ICD-10-CM

## 2025-01-21 RX ORDER — LORAZEPAM 2 MG/1
TABLET ORAL
COMMUNITY
Start: 2024-08-19 | End: 2025-01-21

## 2025-01-21 RX ORDER — LEVOTHYROXINE SODIUM 88 UG/1
88 TABLET ORAL DAILY
Qty: 90 TABLET | Refills: 3 | Status: SHIPPED | OUTPATIENT
Start: 2025-01-21

## 2025-01-21 RX ORDER — LORAZEPAM 2 MG/1
TABLET ORAL
COMMUNITY
Start: 2025-01-08

## 2025-01-21 RX ORDER — DEXTROAMPHETAMINE SACCHARATE, AMPHETAMINE ASPARTATE, DEXTROAMPHETAMINE SULFATE AND AMPHETAMINE SULFATE 7.5; 7.5; 7.5; 7.5 MG/1; MG/1; MG/1; MG/1
TABLET ORAL
COMMUNITY
Start: 2024-11-14

## 2025-01-21 RX ORDER — DULOXETIN HYDROCHLORIDE 20 MG/1
CAPSULE, DELAYED RELEASE ORAL
COMMUNITY
Start: 2025-01-08 | End: 2025-01-21

## 2025-01-21 RX ORDER — BUPROPION HYDROCHLORIDE 150 MG/1
TABLET ORAL
COMMUNITY
Start: 2024-08-19

## 2025-01-21 ASSESSMENT — PAIN SCALES - GENERAL: PAINLEVEL_OUTOF10: NO PAIN (0)

## 2025-03-10 ENCOUNTER — OFFICE VISIT (OUTPATIENT)
Dept: FAMILY MEDICINE | Facility: OTHER | Age: 35
End: 2025-03-10
Payer: COMMERCIAL

## 2025-03-10 ENCOUNTER — ANCILLARY PROCEDURE (OUTPATIENT)
Dept: GENERAL RADIOLOGY | Facility: OTHER | Age: 35
End: 2025-03-10
Payer: COMMERCIAL

## 2025-03-10 ENCOUNTER — TELEPHONE (OUTPATIENT)
Dept: FAMILY MEDICINE | Facility: OTHER | Age: 35
End: 2025-03-10

## 2025-03-10 VITALS
WEIGHT: 227.4 LBS | TEMPERATURE: 97.7 F | RESPIRATION RATE: 18 BRPM | OXYGEN SATURATION: 98 % | HEART RATE: 70 BPM | SYSTOLIC BLOOD PRESSURE: 130 MMHG | BODY MASS INDEX: 29.18 KG/M2 | DIASTOLIC BLOOD PRESSURE: 64 MMHG

## 2025-03-10 DIAGNOSIS — J06.9 UPPER RESPIRATORY TRACT INFECTION, UNSPECIFIED TYPE: Primary | ICD-10-CM

## 2025-03-10 DIAGNOSIS — J06.9 UPPER RESPIRATORY TRACT INFECTION, UNSPECIFIED TYPE: ICD-10-CM

## 2025-03-10 DIAGNOSIS — H61.22 IMPACTED CERUMEN OF LEFT EAR: ICD-10-CM

## 2025-03-10 LAB
FLUAV RNA SPEC QL NAA+PROBE: NEGATIVE
FLUBV RNA RESP QL NAA+PROBE: NEGATIVE
RSV RNA SPEC NAA+PROBE: NEGATIVE
SARS-COV-2 RNA RESP QL NAA+PROBE: NEGATIVE

## 2025-03-10 PROCEDURE — 71046 X-RAY EXAM CHEST 2 VIEWS: CPT | Mod: TC | Performed by: RADIOLOGY

## 2025-03-10 RX ORDER — ALBUTEROL SULFATE 0.83 MG/ML
2.5 SOLUTION RESPIRATORY (INHALATION) EVERY 6 HOURS PRN
Qty: 90 ML | Refills: 3 | Status: SHIPPED | OUTPATIENT
Start: 2025-03-10

## 2025-03-10 RX ORDER — ALBUTEROL SULFATE 90 UG/1
2 INHALANT RESPIRATORY (INHALATION) EVERY 6 HOURS PRN
Qty: 18 G | Refills: 1 | Status: SHIPPED | OUTPATIENT
Start: 2025-03-10

## 2025-03-10 ASSESSMENT — PAIN SCALES - GENERAL: PAINLEVEL_OUTOF10: NO PAIN (0)

## 2025-03-10 NOTE — PROGRESS NOTES
Assessment & Plan     Upper respiratory tract infection, unspecified type  2 week history of cough, congestion and with multiple ill contacts at home. Multiplex swab pending. Chest xray obtained today and with no acute concern. Unclear history of asthma, has had albuterol inhaler in the past. No wheezing or increased work of breathing on exam. Start nebs as needed for shortness of breath, cough, will also fill albuterol inhaler. Supportive cares discussed. Patient requesting note for this past weekend, given today.  - Influenza A/B, RSV and SARS-CoV2 PCR (COVID-19)  - albuterol (PROVENTIL) (2.5 MG/3ML) 0.083% neb solution  Dispense: 90 mL; Refill: 3  - XR CHEST 2 VW (Clinic Performed)        Return if symptoms worsen or fail to improve.    Mercedes Brunner is a 35 year old, presenting for the following health issues:  URI        3/10/2025    12:34 PM   Additional Questions   Roomed by Herman Gerardo lpn   Accompanied by self     History of Present Illness       Reason for visit:  Sick  Symptom onset:  1-2 weeks ago  Symptom intensity:  Moderate  Symptom progression:  Staying the same  Had these symptoms before:  No  What makes it worse:  No  What makes it better:  No   He is taking medications regularly.        Acute Illness  Acute illness concerns: body aches, upper respiratory symptoms, vomiting, nausea, diarrhea  Onset/Duration: 2 weeks ago  Symptoms:  Fever: YES  Chills/Sweats: YES  Headache (location?): YES  Sinus Pressure: YES  Conjunctivitis:  No  Ear Pain: YES: left  Rhinorrhea: YES  Congestion: YES  Sore Throat: No  Cough: YES-non-productive, productive of green sputum, with shortness of breath, barking, improving over time  Wheeze: YES  Decreased Appetite: YES  Nausea: YES  Vomiting: YES  Diarrhea: YES  Dysuria/Freq.: No  Dysuria or Hematuria: No  Fatigue/Achiness: YES  Sick/Strep Exposure: YES  Therapies tried and outcome: nothing    - Kids sick, coughing 2 weeks ago  - Patient developed cough,  body aches  - Fishing in LOW last week  - Friday night stomach upset and few episodes of vomiting. This resolved on Saturday.  - Loose stool Friday and Saturday.  - Dry cough, previously green sputum production.  - No fevers this past week  - Does not smoke  - Notes ongoing congestion, shortness of breath.    Review of Systems  Constitutional, neuro, ENT, endocrine, pulmonary, cardiac, gastrointestinal, genitourinary, musculoskeletal, integument and psychiatric systems are negative, except as otherwise noted.      Objective    /64 (BP Location: Left arm, Patient Position: Sitting, Cuff Size: Adult Large)   Pulse 70   Temp 97.7  F (36.5  C) (Tympanic)   Resp 18   Wt 103.1 kg (227 lb 6.4 oz)   SpO2 98%   BMI 29.18 kg/m    Body mass index is 29.18 kg/m .    Physical Exam   GENERAL: alert and no distress  EYES: Eyes grossly normal to inspection, PERRL and conjunctivae and sclerae normal  HENT: normal cephalic/atraumatic, right ear: normal: no effusions, no erythema, normal landmarks, left ear: occluded with wax, nose and mouth without ulcers or lesions, oropharynx clear, oral mucous membranes moist, no tonsillar erythema, and sinuses: not tender  NECK: no adenopathy, no asymmetry, masses, or scars  RESP: rhonchi clears with cough - no rales or wheezes  CV: regular rate and rhythm, normal S1 S2, no S3 or S4, no murmur, click or rub, no peripheral edema  ABDOMEN: soft, nontender, no hepatosplenomegaly, no masses and bowel sounds normal  MS: no gross musculoskeletal defects noted, no edema  NEURO: Normal strength and tone, mentation intact and speech normal  PSYCH: mentation appears normal, affect normal/bright            Signed Electronically by: SANA Hurt CNP

## 2025-03-10 NOTE — LETTER
3/10/2025    Kannan Ordonez   1990        To Whom it May Concern;    Please excuse Kannan Ordonez from work/school on  and 10 due to illness, and for a healthcare visit on Mar 10, 2025.    Sincerely,        SANA Hurt CNP

## 2025-03-10 NOTE — TELEPHONE ENCOUNTER
10:40 AM    Reason for Call: OVERBOOK    Patient is having the following symptoms: chest cold for 3 weeks.    The patient is requesting an appointment for today with LEON Aguirre.    Was an appointment offered for this call? no  If yes : Appointment type              Date    Preferred method for responding to this message: Telephone Call  What is your phone number ? [phone]    If we cannot reach you directly, may we leave a detailed response at the number you provided? Yes    Can this message wait until your PCP/provider returns, if unavailable today? Jess Boone

## 2025-05-06 ENCOUNTER — TELEPHONE (OUTPATIENT)
Dept: UROLOGY | Facility: OTHER | Age: 35
End: 2025-05-06

## 2025-05-07 ENCOUNTER — APPOINTMENT (OUTPATIENT)
Dept: CHIROPRACTIC MEDICINE | Facility: OTHER | Age: 35
End: 2025-05-07

## 2025-05-07 ENCOUNTER — APPOINTMENT (OUTPATIENT)
Dept: OCCUPATIONAL MEDICINE | Facility: OTHER | Age: 35
End: 2025-05-07

## 2025-05-07 PROCEDURE — 99499 UNLISTED E&M SERVICE: CPT

## 2025-07-15 ENCOUNTER — VIRTUAL VISIT (OUTPATIENT)
Dept: URGENT CARE | Facility: CLINIC | Age: 35
End: 2025-07-15
Payer: COMMERCIAL

## 2025-07-15 DIAGNOSIS — M54.50 ACUTE BILATERAL LOW BACK PAIN WITHOUT SCIATICA: Primary | ICD-10-CM

## 2025-07-15 PROCEDURE — 98001 SYNCH AUDIO-VIDEO NEW LOW 30: CPT

## 2025-07-15 NOTE — PROGRESS NOTES
Kannan is a 35 year old male who presents for a billable video visit.    ASSESSMENT/PLAN:  Diagnoses and all orders for this visit:    Acute bilateral low back pain without sciatica    Work note provided for patient and is in MyChart   Supportive care recommended (rest, analgesics such as acetaminophen and ibuprofen)    Follow up with primary care provider with any problems, questions or concerns or if symptoms worsen or fail to improve. Patient agreed to plan and verbalized understanding.     SUBJECTIVE:  Patient reports he hurt his back on 07/13/25 while playing baseball with his son He missed work that day during the night shift and is requesting a work note. He wants to return back to work on 07/18/25. He reports the back pain is getting better and acetaminophen and ibuprofen are helpful.    ROS: Pertinent ROS neg other than the symptoms noted above in the HPI.     OBJECTIVE:  Vitals not done due to this being a virtual visit    GENERAL: healthy, alert and no distress  EYES: Eyes grossly normal to inspection,conjunctivae and sclerae normal  RESP: Able to speak in complete sentences, no audible wheeze or cough  SKIN: no suspicious lesions or rashes  NEURO: mentation intact and speech normal  PSYCH: mentation appears normal, affect normal/bright    Video-Visit Details    Type of service:  Video Visit  Video Start Time: 1:33 pm  Video End Time: 1:37 pm    Originating Location: Home    Distant Location:  [facility]     Platform used for Video Visit: Joanne Valadez PA-C